# Patient Record
Sex: FEMALE | Race: WHITE | NOT HISPANIC OR LATINO | Employment: OTHER | URBAN - METROPOLITAN AREA
[De-identification: names, ages, dates, MRNs, and addresses within clinical notes are randomized per-mention and may not be internally consistent; named-entity substitution may affect disease eponyms.]

---

## 2019-06-17 ENCOUNTER — LAB REQUISITION (OUTPATIENT)
Dept: LAB | Facility: HOSPITAL | Age: 79
End: 2019-06-17

## 2019-06-17 DIAGNOSIS — R31.0 GROSS HEMATURIA: ICD-10-CM

## 2021-05-19 ENCOUNTER — TRANSCRIBE ORDERS (OUTPATIENT)
Dept: ADMINISTRATIVE | Facility: HOSPITAL | Age: 81
End: 2021-05-19

## 2021-05-19 DIAGNOSIS — R13.10 DYSPHAGIA, UNSPECIFIED TYPE: Primary | ICD-10-CM

## 2021-05-25 ENCOUNTER — APPOINTMENT (OUTPATIENT)
Dept: GENERAL RADIOLOGY | Facility: HOSPITAL | Age: 81
End: 2021-05-25

## 2021-05-30 ENCOUNTER — APPOINTMENT (OUTPATIENT)
Dept: PREADMISSION TESTING | Facility: HOSPITAL | Age: 81
End: 2021-05-30

## 2021-05-30 LAB — SARS-COV-2 RNA NOSE QL NAA+PROBE: NOT DETECTED

## 2021-05-30 PROCEDURE — U0005 INFEC AGEN DETEC AMPLI PROBE: HCPCS

## 2021-05-30 PROCEDURE — C9803 HOPD COVID-19 SPEC COLLECT: HCPCS

## 2021-05-30 PROCEDURE — U0004 COV-19 TEST NON-CDC HGH THRU: HCPCS

## 2021-06-02 ENCOUNTER — HOSPITAL ENCOUNTER (OUTPATIENT)
Dept: GENERAL RADIOLOGY | Facility: HOSPITAL | Age: 81
Discharge: HOME | End: 2021-06-02
Admitting: INTERNAL MEDICINE

## 2021-06-02 DIAGNOSIS — R13.10 DYSPHAGIA, UNSPECIFIED TYPE: ICD-10-CM

## 2021-06-02 PROCEDURE — 63710000001 BARIUM SULFATE 40 % PASTE: Performed by: INTERNAL MEDICINE

## 2021-06-02 PROCEDURE — 74230 X-RAY XM SWLNG FUNCJ C+: CPT

## 2021-06-02 PROCEDURE — A9270 NON-COVERED ITEM OR SERVICE: HCPCS | Performed by: INTERNAL MEDICINE

## 2021-06-02 PROCEDURE — 63710000001 BARIUM SULFATE 40 % RECONSTITUTED SUSPENSION: Performed by: INTERNAL MEDICINE

## 2021-06-02 PROCEDURE — 92611 MOTION FLUOROSCOPY/SWALLOW: CPT

## 2021-06-02 RX ADMIN — BARIUM SULFATE 100 ML: 0.81 POWDER, FOR SUSPENSION ORAL at 10:56

## 2021-06-02 RX ADMIN — BARIUM SULFATE 20 ML: 400 PASTE ORAL at 10:56

## 2021-06-02 NOTE — MBS/VFSS/FEES
"Outpatient Speech Language Pathology   Adult Swallow Initial Evaluation  [facility]   Modified Barium Swallow Study (MBS)     Patient Name: Naila Landin  : 1940  MRN: [id number]  Today's Date: 2021         Visit Date: 2021       Visit Dx:     ICD-10-CM ICD-9-CM   1. Dysphagia, unspecified type  R13.10 787.20         SLP Adult Swallow Evaluation     Row Name 21 1030       Rehab Evaluation    Document Type  evaluation  -AC    Subjective Information  no complaints  -AC    Patient Observations  alert;cooperative  -AC    Patient/Family/Caregiver Comments/Observations  No family present.  -AC    Patient Effort  good  -AC       General Information    Patient Profile Reviewed  yes  -AC    Pertinent History Of Current Problem  Patient was referred for modified barium swallow study due to intermittent \"choking\" with food/liquid and globus sensation upper right throat. Reported that she recently underwent EGD that showed evidence of \"reflux,\" but no other concerns. Patient denied history of neurological or respiratory diagnoses.  -AC    Current Method of Nutrition  regular textures;thin liquids  -AC    Precautions/Limitations, Vision  WFL;for purposes of eval  -AC    Precautions/Limitations, Hearing  WFL;for purposes of eval  -AC       Pain    Additional Documentation  Pain Scale: FACES Pre/Post-Treatment (Group)  -AC       Pain Scale: FACES Pre/Post-Treatment    Pain: FACES Scale, Pretreatment  0-->no hurt  -AC    Posttreatment Pain Rating  0-->no hurt  -AC       Oral Motor Structure and Function    Dentition Assessment  natural, present and adequate  -AC    Secretion Management  WNL/WFL  -AC    Mucosal Quality  moist, healthy  -AC       General Eating/Swallowing Observations    Respiratory Support Currently in Use  room air  -AC    Eating/Swallowing Skills  self-fed;appropriate self-feeding skills observed  -AC    Positioning During Eating  upright 90 degree;upright in chair  -AC       MBS/VFSS    " Utensils Used  spoon;cup;straw  -    Consistencies Trialed  thin liquids;pudding thick;regular textures  -       MBS/VFSS Interpretation    Oral Prep Phase  WFL  -AC    Oral Transit Phase  WFL  -AC    Oral Residue  WFL  -AC       Initiation of Pharyngeal Swallow    Initiation of Pharyngeal Swallow  bolus in valleculae  -    Pharyngeal Phase  functional pharyngeal phase of swallowing  -    Pharyngeal Residue  all consistencies tested;valleculae;other (see comments) minimal amount  -    Pharyngeal Phase, Comment  Grossly functional oropharyngeal swallow. Adequate timing of pharyngeal swallow initiation and closure at the entrance to the airway. No penetration/aspiration appreciated with any consistency trialed, even when patient pushed with consecutive straw drinks. Minimal vallecular residue noted across consistencies, but easily cleared with subsequent and spontaneous swallows. Of note, patient appeared to exhibit prominent cricopharygeous, which partially occluded bolus flow through PES, but did not appear to affect swallow safety or result in retrograde flow.     Suspect patient's complaints of globus may be related to recently diagnosed reflux or potentially hypersensitivity to minimal pharyngoesophageal residue. May also consider ENT referral to r/o anatomical/tissue abnormalities, per physician discretion.  -       Clinical Impression    SLP Swallowing Diagnosis  functional oral phase;functional pharyngeal phase  -    Swallow Criteria for Skilled Therapeutic Interventions Met  no problems identified which require skilled intervention  -       Recommendations    SLP Diet Recommendation  regular textures;thin liquids  -    Recommended Precautions and Strategies  upright posture during/after eating;general aspiration precautions;reflux precautions  -    Oral Care Recommendations  Oral Care BID/PRN  -AC    SLP Rec. for Method of Medication Administration  meds whole;with thin liquids;with  pudding or applesauce;as tolerated  -AC    Demonstrates Need for Referral to Another Service  otolaryngology (ENT);other (see comments) per physician discretion  -AC      User Key  (r) = Recorded By, (t) = Taken By, (c) = Cosigned By    Initials Name Provider Type    Mechelle Gaytan MS CCC-SLP Speech and Language Pathologist          OP SLP Education     Row Name 06/02/21 1347       Education    Barriers to Learning  No barriers identified  -AC    Education Provided  Described results of evaluation;Patient expressed understanding of evaluation  -AC    Assessed  Learning needs;Learning motivation;Learning preferences;Learning readiness  -AC    Learning Motivation  Strong  -AC    Learning Method  Explanation;Written materials  -AC    Teaching Response  Verbalized understanding  -AC    Education Comments  Provided written handouts re: reflux and managment.  -AC      User Key  (r) = Recorded By, (t) = Taken By, (c) = Cosigned By    Initials Name Effective Dates    AC Mechelle Shin MS CCC-SLP 07/27/17 -           OP SLP Assessment/Plan - 06/02/21 1346        SLP Assessment    Functional Problems  Swallowing   -AC    Clinical Impression: Swallowing  --    Grossly functional oropharyngeal swallow  -AC    Prognosis  Good (comment)   -AC    Patient would benefit from skilled therapy intervention  No   -AC      User Key  (r) = Recorded By, (t) = Taken By, (c) = Cosigned By    Initials Name Provider Type    Mechelle Gaytan MS CCC-SLP Speech and Language Pathologist        Time Calculation:   SLP Start Time: 1030  Untimed Charges  67590-SI Motion Fluoro Eval Swallow Minutes: 54  Total Minutes  Untimed Charges Total Minutes: 54   Total Minutes: 54    Therapy Charges for Today     Code Description Service Date Service Provider Modifiers Qty    [id number] HC ST MOTION FLUORO EVAL SWALLOW 4 6/2/2021 Mechelle Shin MS CCC-SLP GN 1        Patient was not wearing a face mask and did not exhibit coughing during this therapy  encounter.  Procedure performed was aerosolizing, involved close contact (within 6 feet for at least 15 minutes or longer), and did not involve contact with infectious secretions or specimens.  Therapist used appropriate personal protective equipment including gloves, standard procedure mask and eye protection.  Appropriate PPE was worn during the entire therapy session.  Hand hygiene was completed before and after therapy session.              Mechelle Shin MS CCC-SLP  6/2/2021

## 2022-04-28 ENCOUNTER — TRANSCRIBE ORDERS (OUTPATIENT)
Dept: CARDIOLOGY | Facility: HOSPITAL | Age: 82
End: 2022-04-28

## 2022-04-28 ENCOUNTER — HOSPITAL ENCOUNTER (OUTPATIENT)
Dept: CARDIOLOGY | Facility: HOSPITAL | Age: 82
Discharge: HOME | End: 2022-04-28
Admitting: OTOLARYNGOLOGY

## 2022-04-28 DIAGNOSIS — Z01.811 PRE-OP CHEST EXAM: ICD-10-CM

## 2022-04-28 DIAGNOSIS — Z01.811 PRE-OP CHEST EXAM: Primary | ICD-10-CM

## 2022-04-28 LAB
QT INTERVAL: 290 MS
QTC INTERVAL: 301 MS

## 2022-04-28 PROCEDURE — 93010 ELECTROCARDIOGRAM REPORT: CPT | Performed by: INTERNAL MEDICINE

## 2022-04-28 PROCEDURE — 93005 ELECTROCARDIOGRAM TRACING: CPT | Performed by: OTOLARYNGOLOGY

## 2023-03-01 ENCOUNTER — TRANSCRIBE ORDERS (OUTPATIENT)
Dept: ADMINISTRATIVE | Facility: HOSPITAL | Age: 83
End: 2023-03-01
Payer: MEDICARE

## 2023-03-01 DIAGNOSIS — Z12.31 VISIT FOR SCREENING MAMMOGRAM: Primary | ICD-10-CM

## 2024-07-18 ENCOUNTER — PRE-ADMISSION TESTING (OUTPATIENT)
Dept: PREADMISSION TESTING | Facility: HOSPITAL | Age: 84
End: 2024-07-18
Payer: MEDICARE

## 2024-07-18 VITALS — HEIGHT: 66 IN | BODY MASS INDEX: 32.17 KG/M2 | WEIGHT: 200.18 LBS

## 2024-07-18 DIAGNOSIS — Z01.89 LABORATORY TEST: Primary | ICD-10-CM

## 2024-07-18 LAB
ABO GROUP BLD: NORMAL
ALBUMIN SERPL-MCNC: 3.9 G/DL (ref 3.5–5.2)
ALBUMIN/GLOB SERPL: 1.3 G/DL
ALP SERPL-CCNC: 106 U/L (ref 39–117)
ALT SERPL W P-5'-P-CCNC: 19 U/L (ref 1–33)
ANION GAP SERPL CALCULATED.3IONS-SCNC: 10 MMOL/L (ref 5–15)
AST SERPL-CCNC: 22 U/L (ref 1–32)
BILIRUB SERPL-MCNC: 0.3 MG/DL (ref 0–1.2)
BUN SERPL-MCNC: 17 MG/DL (ref 8–23)
BUN/CREAT SERPL: 22.1 (ref 7–25)
CALCIUM SPEC-SCNC: 9.1 MG/DL (ref 8.6–10.5)
CEA SERPL-MCNC: 1.42 NG/ML
CHLORIDE SERPL-SCNC: 105 MMOL/L (ref 98–107)
CO2 SERPL-SCNC: 24 MMOL/L (ref 22–29)
CREAT SERPL-MCNC: 0.77 MG/DL (ref 0.57–1)
DEPRECATED RDW RBC AUTO: 44.3 FL (ref 37–54)
EGFRCR SERPLBLD CKD-EPI 2021: 77.1 ML/MIN/1.73
ERYTHROCYTE [DISTWIDTH] IN BLOOD BY AUTOMATED COUNT: 12.4 % (ref 12.3–15.4)
GLOBULIN UR ELPH-MCNC: 3.1 GM/DL
GLUCOSE SERPL-MCNC: 111 MG/DL (ref 65–99)
HBA1C MFR BLD: 6.2 % (ref 4.8–5.6)
HCT VFR BLD AUTO: 44.8 % (ref 34–46.6)
HGB BLD-MCNC: 14.4 G/DL (ref 12–15.9)
MCH RBC QN AUTO: 31 PG (ref 26.6–33)
MCHC RBC AUTO-ENTMCNC: 32.1 G/DL (ref 31.5–35.7)
MCV RBC AUTO: 96.3 FL (ref 79–97)
PLATELET # BLD AUTO: 246 10*3/MM3 (ref 140–450)
PMV BLD AUTO: 10.6 FL (ref 6–12)
POTASSIUM SERPL-SCNC: 4.4 MMOL/L (ref 3.5–5.2)
PROT SERPL-MCNC: 7 G/DL (ref 6–8.5)
RBC # BLD AUTO: 4.65 10*6/MM3 (ref 3.77–5.28)
RH BLD: POSITIVE
SODIUM SERPL-SCNC: 139 MMOL/L (ref 136–145)
WBC NRBC COR # BLD AUTO: 8.95 10*3/MM3 (ref 3.4–10.8)

## 2024-07-18 PROCEDURE — 86900 BLOOD TYPING SEROLOGIC ABO: CPT

## 2024-07-18 PROCEDURE — 93005 ELECTROCARDIOGRAM TRACING: CPT

## 2024-07-18 PROCEDURE — 36415 COLL VENOUS BLD VENIPUNCTURE: CPT

## 2024-07-18 PROCEDURE — 86901 BLOOD TYPING SEROLOGIC RH(D): CPT

## 2024-07-18 PROCEDURE — 83036 HEMOGLOBIN GLYCOSYLATED A1C: CPT

## 2024-07-18 PROCEDURE — 85027 COMPLETE CBC AUTOMATED: CPT

## 2024-07-18 PROCEDURE — 80053 COMPREHEN METABOLIC PANEL: CPT

## 2024-07-18 PROCEDURE — 82378 CARCINOEMBRYONIC ANTIGEN: CPT

## 2024-07-18 RX ORDER — LEVOTHYROXINE SODIUM 0.03 MG/1
25 TABLET ORAL
COMMUNITY

## 2024-07-18 RX ORDER — MULTIPLE VITAMINS W/ MINERALS TAB 9MG-400MCG
1 TAB ORAL DAILY
COMMUNITY

## 2024-07-18 RX ORDER — METOPROLOL SUCCINATE 100 MG/1
100 TABLET, EXTENDED RELEASE ORAL NIGHTLY
COMMUNITY

## 2024-07-18 NOTE — PAT
"Patient to apply Chlorhexadine wipes  to surgical area (as instructed) the night before procedure and the AM of procedure. Wipes provided.    Patient instructed to drink 20 ounces of Gatorade or Gatorlyte (if diabetic) and it needs to be completed 1 hour (for Main OR patients) or 2 hours (scheduled  section & [facility] patients) before given arrival time for procedure (NO RED Gatorade and NO Gatorade Zero).  Patient verbalized understanding.    Ten (8 ounce) Impact Advanced Recovery Nutritional Drinks distributed to patient from Pre Admission Testing Department.  Verbal and written instructions given that patient must consume two (8 ounce) Impact drinks a day for five days before surgery.  Flavoring tip sheet provided as well the brochure \"Go in Stronger. Get Home Sooner\" that explains benefits of nutritional drinks to enhance recovery. Patient/family verbalized understanding.     Patient viewed general PAT education video as instructed in their preoperative information received from their surgeon.  Patient stated the general PAT education video was viewed in its entirety and survey completed.  Copies of [facility] general education handouts (Incentive Spirometry, Meds to Beds Program, Patient Belongings, Pre-op skin preparation instructions, Blood Glucose testing, Visitor policy, Surgery FAQ, Code H) distributed to patient if not printed. Education related to the PAT pass and skin preparation for surgery (if applicable) completed in PAT as a reinforcement to PAT education video. Patient instructed to return PAT pass provided today as well as completed skin preparation sheet (if applicable) on the day of procedure.     Additionally if patient had not viewed video yet but intended to view it at home or in our waiting area, then referred them to the handout with QR code/link provided during PAT visit.  Instructed patient to complete survey after viewing the video in its entirety.  Encouraged patient/family to read PAT " general education handouts thoroughly and notify PAT staff with any questions or concerns. Patient verbalized understanding of all information and priority content.    Lizbeth Landin gi navigator notified of surgery    Azeem with wound ostomy came to talk with patient

## 2024-07-21 LAB
QT INTERVAL: 406 MS
QTC INTERVAL: 462 MS

## 2024-07-23 ENCOUNTER — TRANSCRIBE ORDERS (OUTPATIENT)
Dept: ADMINISTRATIVE | Facility: HOSPITAL | Age: 84
End: 2024-07-23
Payer: MEDICARE

## 2024-07-23 DIAGNOSIS — D49.0 CECAL NEOPLASM: Primary | ICD-10-CM

## 2024-07-26 ENCOUNTER — HOSPITAL ENCOUNTER (OUTPATIENT)
Facility: HOSPITAL | Age: 84
Discharge: HOME | End: 2024-07-26
Payer: MEDICARE

## 2024-07-26 DIAGNOSIS — D49.0 CECAL NEOPLASM: ICD-10-CM

## 2024-07-26 PROCEDURE — 74177 CT ABD & PELVIS W/CONTRAST: CPT

## 2024-07-26 PROCEDURE — 25510000001 IOPAMIDOL 61 % SOLUTION: Performed by: COLON & RECTAL SURGERY

## 2024-07-26 RX ADMIN — IOPAMIDOL 100 ML: 612 INJECTION, SOLUTION INTRAVENOUS at 14:17

## 2024-08-05 ENCOUNTER — ANESTHESIA EVENT (OUTPATIENT)
Dept: PERIOP | Facility: HOSPITAL | Age: 84
End: 2024-08-05
Payer: MEDICARE

## 2024-08-05 RX ORDER — SODIUM CHLORIDE 0.9 % (FLUSH) 0.9 %
10 SYRINGE (ML) INJECTION EVERY 12 HOURS SCHEDULED
Status: CANCELLED | OUTPATIENT
Start: 2024-08-05

## 2024-08-05 RX ORDER — SODIUM CHLORIDE 9 MG/ML
40 INJECTION, SOLUTION INTRAVENOUS AS NEEDED
Status: CANCELLED | OUTPATIENT
Start: 2024-08-05

## 2024-08-05 RX ORDER — SODIUM CHLORIDE 0.9 % (FLUSH) 0.9 %
10 SYRINGE (ML) INJECTION AS NEEDED
Status: CANCELLED | OUTPATIENT
Start: 2024-08-05

## 2024-08-06 ENCOUNTER — ANESTHESIA (OUTPATIENT)
Dept: PERIOP | Facility: HOSPITAL | Age: 84
End: 2024-08-06
Payer: MEDICARE

## 2024-08-06 ENCOUNTER — APPOINTMENT (OUTPATIENT)
Dept: GENERAL RADIOLOGY | Facility: HOSPITAL | Age: 84
DRG: 331 | End: 2024-08-06
Payer: MEDICARE

## 2024-08-06 ENCOUNTER — ANESTHESIA EVENT CONVERTED (OUTPATIENT)
Dept: ANESTHESIOLOGY | Facility: HOSPITAL | Age: 84
DRG: 331 | End: 2024-08-06
Payer: MEDICARE

## 2024-08-06 ENCOUNTER — HOSPITAL ENCOUNTER (INPATIENT)
Facility: HOSPITAL | Age: 84
LOS: 6 days | Discharge: HOME HEALTH | DRG: 331 | End: 2024-08-12
Attending: COLON & RECTAL SURGERY | Admitting: COLON & RECTAL SURGERY
Payer: MEDICARE

## 2024-08-06 DIAGNOSIS — E03.9 HYPOTHYROIDISM (ACQUIRED): ICD-10-CM

## 2024-08-06 DIAGNOSIS — R73.09 ELEVATED HEMOGLOBIN A1C: ICD-10-CM

## 2024-08-06 DIAGNOSIS — Z90.49 S/P RIGHT COLECTOMY: Primary | ICD-10-CM

## 2024-08-06 DIAGNOSIS — I10 HYPERTENSION, UNSPECIFIED TYPE: ICD-10-CM

## 2024-08-06 DIAGNOSIS — D49.0 CECAL NEOPLASM: ICD-10-CM

## 2024-08-06 LAB
ABO GROUP BLD: NORMAL
BLD GP AB SCN SERPL QL: NEGATIVE
RH BLD: POSITIVE
T&S EXPIRATION DATE: NORMAL

## 2024-08-06 PROCEDURE — 88305 TISSUE EXAM BY PATHOLOGIST: CPT | Performed by: COLON & RECTAL SURGERY

## 2024-08-06 PROCEDURE — 74018 RADEX ABDOMEN 1 VIEW: CPT

## 2024-08-06 PROCEDURE — 25010000002 PROPOFOL 10 MG/ML EMULSION: Performed by: ANESTHESIOLOGY

## 2024-08-06 PROCEDURE — 25010000002 ERTAPENEM PER 500 MG: Performed by: COLON & RECTAL SURGERY

## 2024-08-06 PROCEDURE — 88309 TISSUE EXAM BY PATHOLOGIST: CPT | Performed by: COLON & RECTAL SURGERY

## 2024-08-06 PROCEDURE — 86850 RBC ANTIBODY SCREEN: CPT | Performed by: COLON & RECTAL SURGERY

## 2024-08-06 PROCEDURE — 0DTF0ZZ RESECTION OF RIGHT LARGE INTESTINE, OPEN APPROACH: ICD-10-PCS | Performed by: COLON & RECTAL SURGERY

## 2024-08-06 PROCEDURE — 25810000003 LACTATED RINGERS PER 1000 ML: Performed by: ANESTHESIOLOGY

## 2024-08-06 PROCEDURE — 86901 BLOOD TYPING SEROLOGIC RH(D): CPT | Performed by: COLON & RECTAL SURGERY

## 2024-08-06 PROCEDURE — 25010000002 FENTANYL CITRATE (PF) 50 MCG/ML SOLUTION

## 2024-08-06 PROCEDURE — 86900 BLOOD TYPING SEROLOGIC ABO: CPT | Performed by: COLON & RECTAL SURGERY

## 2024-08-06 PROCEDURE — 25010000002 ONDANSETRON PER 1 MG: Performed by: ANESTHESIOLOGY

## 2024-08-06 PROCEDURE — 25010000002 DEXAMETHASONE SODIUM PHOSPHATE 10 MG/ML SOLUTION: Performed by: ANESTHESIOLOGY

## 2024-08-06 PROCEDURE — 25010000002 HYDROMORPHONE 1 MG/ML SOLUTION

## 2024-08-06 PROCEDURE — 25010000002 HEPARIN (PORCINE) PER 1000 UNITS: Performed by: COLON & RECTAL SURGERY

## 2024-08-06 PROCEDURE — 25010000002 BUPIVACAINE (PF) 0.25 % SOLUTION: Performed by: ANESTHESIOLOGY

## 2024-08-06 PROCEDURE — 0DNW4ZZ RELEASE PERITONEUM, PERCUTANEOUS ENDOSCOPIC APPROACH: ICD-10-PCS | Performed by: COLON & RECTAL SURGERY

## 2024-08-06 PROCEDURE — 25010000002 SODIUM CHLORIDE 0.9 % WITH KCL 20 MEQ 20-0.9 MEQ/L-% SOLUTION: Performed by: COLON & RECTAL SURGERY

## 2024-08-06 PROCEDURE — 25010000002 SUGAMMADEX 200 MG/2ML SOLUTION: Performed by: ANESTHESIOLOGY

## 2024-08-06 DEVICE — PROXIMATE RELOADABLE LINEAR CUTTER WITH SAFETY LOCK-OUT, 75MM
Type: IMPLANTABLE DEVICE | Site: ABDOMEN | Status: FUNCTIONAL
Brand: PROXIMATE

## 2024-08-06 DEVICE — PROXIMATE LINEAR CUTTER RELOAD, BLUE, 75MM
Type: IMPLANTABLE DEVICE | Site: ABDOMEN | Status: FUNCTIONAL
Brand: PROXIMATE

## 2024-08-06 DEVICE — PROXIMATE RELOADABLE LINEAR STAPLER
Type: IMPLANTABLE DEVICE | Site: ABDOMEN | Status: FUNCTIONAL
Brand: PROXIMATE

## 2024-08-06 RX ORDER — SODIUM CHLORIDE, SODIUM LACTATE, POTASSIUM CHLORIDE, CALCIUM CHLORIDE 600; 310; 30; 20 MG/100ML; MG/100ML; MG/100ML; MG/100ML
9 INJECTION, SOLUTION INTRAVENOUS CONTINUOUS
Status: DISCONTINUED | OUTPATIENT
Start: 2024-08-06 | End: 2024-08-10

## 2024-08-06 RX ORDER — PREGABALIN 75 MG/1
75 CAPSULE ORAL ONCE
Status: COMPLETED | OUTPATIENT
Start: 2024-08-06 | End: 2024-08-06

## 2024-08-06 RX ORDER — BUPIVACAINE HYDROCHLORIDE 2.5 MG/ML
INJECTION, SOLUTION EPIDURAL; INFILTRATION; INTRACAUDAL
Status: COMPLETED | OUTPATIENT
Start: 2024-08-06 | End: 2024-08-06

## 2024-08-06 RX ORDER — ALVIMOPAN 12 MG/1
12 CAPSULE ORAL ONCE
Status: COMPLETED | OUTPATIENT
Start: 2024-08-06 | End: 2024-08-06

## 2024-08-06 RX ORDER — HYDROMORPHONE HYDROCHLORIDE 1 MG/ML
0.5 INJECTION, SOLUTION INTRAMUSCULAR; INTRAVENOUS; SUBCUTANEOUS
Status: DISCONTINUED | OUTPATIENT
Start: 2024-08-06 | End: 2024-08-06 | Stop reason: HOSPADM

## 2024-08-06 RX ORDER — GABAPENTIN 100 MG/1
100 CAPSULE ORAL 2 TIMES DAILY
Status: DISPENSED | OUTPATIENT
Start: 2024-08-06 | End: 2024-08-09

## 2024-08-06 RX ORDER — DIAZEPAM 5 MG/1
5 TABLET ORAL EVERY 6 HOURS PRN
Status: DISCONTINUED | OUTPATIENT
Start: 2024-08-06 | End: 2024-08-12 | Stop reason: HOSPADM

## 2024-08-06 RX ORDER — FAMOTIDINE 10 MG/ML
20 INJECTION, SOLUTION INTRAVENOUS ONCE
Status: DISCONTINUED | OUTPATIENT
Start: 2024-08-06 | End: 2024-08-06 | Stop reason: HOSPADM

## 2024-08-06 RX ORDER — IBUPROFEN 600 MG/1
1 TABLET ORAL
Status: DISCONTINUED | OUTPATIENT
Start: 2024-08-06 | End: 2024-08-12

## 2024-08-06 RX ORDER — DEXAMETHASONE SODIUM PHOSPHATE 10 MG/ML
INJECTION, SOLUTION INTRAMUSCULAR; INTRAVENOUS
Status: COMPLETED | OUTPATIENT
Start: 2024-08-06 | End: 2024-08-06

## 2024-08-06 RX ORDER — LATANOPROST 50 UG/ML
1 SOLUTION/ DROPS OPHTHALMIC NIGHTLY
COMMUNITY

## 2024-08-06 RX ORDER — DEXTROSE MONOHYDRATE 25 G/50ML
25 INJECTION, SOLUTION INTRAVENOUS
Status: DISCONTINUED | OUTPATIENT
Start: 2024-08-06 | End: 2024-08-12

## 2024-08-06 RX ORDER — METOPROLOL SUCCINATE 100 MG/1
100 TABLET, EXTENDED RELEASE ORAL NIGHTLY
Status: DISCONTINUED | OUTPATIENT
Start: 2024-08-07 | End: 2024-08-12 | Stop reason: HOSPADM

## 2024-08-06 RX ORDER — LIDOCAINE HYDROCHLORIDE 10 MG/ML
0.5 INJECTION, SOLUTION EPIDURAL; INFILTRATION; INTRACAUDAL; PERINEURAL ONCE AS NEEDED
Status: COMPLETED | OUTPATIENT
Start: 2024-08-06 | End: 2024-08-06

## 2024-08-06 RX ORDER — NITROGLYCERIN 0.4 MG/1
0.4 TABLET SUBLINGUAL
Status: DISCONTINUED | OUTPATIENT
Start: 2024-08-06 | End: 2024-08-12 | Stop reason: HOSPADM

## 2024-08-06 RX ORDER — ACETAMINOPHEN 500 MG
1000 TABLET ORAL ONCE
Status: COMPLETED | OUTPATIENT
Start: 2024-08-06 | End: 2024-08-06

## 2024-08-06 RX ORDER — FAMOTIDINE 20 MG/1
20 TABLET, FILM COATED ORAL ONCE
Status: COMPLETED | OUTPATIENT
Start: 2024-08-06 | End: 2024-08-06

## 2024-08-06 RX ORDER — ALVIMOPAN 12 MG/1
12 CAPSULE ORAL 2 TIMES DAILY
Status: DISCONTINUED | OUTPATIENT
Start: 2024-08-07 | End: 2024-08-11 | Stop reason: ALTCHOICE

## 2024-08-06 RX ORDER — ONDANSETRON 2 MG/ML
INJECTION INTRAMUSCULAR; INTRAVENOUS AS NEEDED
Status: DISCONTINUED | OUTPATIENT
Start: 2024-08-06 | End: 2024-08-06 | Stop reason: SURG

## 2024-08-06 RX ORDER — INSULIN LISPRO 100 [IU]/ML
2-7 INJECTION, SOLUTION INTRAVENOUS; SUBCUTANEOUS
Status: DISCONTINUED | OUTPATIENT
Start: 2024-08-07 | End: 2024-08-08 | Stop reason: ALTCHOICE

## 2024-08-06 RX ORDER — DROPERIDOL 2.5 MG/ML
0.62 INJECTION, SOLUTION INTRAMUSCULAR; INTRAVENOUS ONCE AS NEEDED
Status: DISCONTINUED | OUTPATIENT
Start: 2024-08-06 | End: 2024-08-06 | Stop reason: HOSPADM

## 2024-08-06 RX ORDER — BUPIVACAINE HCL/0.9 % NACL/PF 0.125 %
PLASTIC BAG, INJECTION (ML) EPIDURAL AS NEEDED
Status: DISCONTINUED | OUTPATIENT
Start: 2024-08-06 | End: 2024-08-06 | Stop reason: SURG

## 2024-08-06 RX ORDER — FENTANYL CITRATE 50 UG/ML
INJECTION, SOLUTION INTRAMUSCULAR; INTRAVENOUS
Status: COMPLETED
Start: 2024-08-06 | End: 2024-08-06

## 2024-08-06 RX ORDER — SODIUM CHLORIDE AND POTASSIUM CHLORIDE 150; 900 MG/100ML; MG/100ML
100 INJECTION, SOLUTION INTRAVENOUS CONTINUOUS
Status: DISCONTINUED | OUTPATIENT
Start: 2024-08-06 | End: 2024-08-07 | Stop reason: ALTCHOICE

## 2024-08-06 RX ORDER — NICOTINE POLACRILEX 4 MG
15 LOZENGE BUCCAL
Status: DISCONTINUED | OUTPATIENT
Start: 2024-08-06 | End: 2024-08-12

## 2024-08-06 RX ORDER — HYDROMORPHONE HYDROCHLORIDE 1 MG/ML
0.5 INJECTION, SOLUTION INTRAMUSCULAR; INTRAVENOUS; SUBCUTANEOUS
Status: DISCONTINUED | OUTPATIENT
Start: 2024-08-06 | End: 2024-08-06 | Stop reason: SDUPTHER

## 2024-08-06 RX ORDER — IBUPROFEN 400 MG/1
400 TABLET ORAL EVERY 6 HOURS PRN
Status: DISCONTINUED | OUTPATIENT
Start: 2024-08-06 | End: 2024-08-12 | Stop reason: HOSPADM

## 2024-08-06 RX ORDER — FENTANYL CITRATE 50 UG/ML
50 INJECTION, SOLUTION INTRAMUSCULAR; INTRAVENOUS
Status: DISCONTINUED | OUTPATIENT
Start: 2024-08-06 | End: 2024-08-06 | Stop reason: HOSPADM

## 2024-08-06 RX ORDER — ROCURONIUM BROMIDE 10 MG/ML
INJECTION, SOLUTION INTRAVENOUS AS NEEDED
Status: DISCONTINUED | OUTPATIENT
Start: 2024-08-06 | End: 2024-08-06 | Stop reason: SURG

## 2024-08-06 RX ORDER — NALOXONE HCL 0.4 MG/ML
0.4 VIAL (ML) INJECTION
Status: DISCONTINUED | OUTPATIENT
Start: 2024-08-06 | End: 2024-08-12 | Stop reason: HOSPADM

## 2024-08-06 RX ORDER — LIDOCAINE HYDROCHLORIDE 10 MG/ML
INJECTION, SOLUTION EPIDURAL; INFILTRATION; INTRACAUDAL; PERINEURAL AS NEEDED
Status: DISCONTINUED | OUTPATIENT
Start: 2024-08-06 | End: 2024-08-06 | Stop reason: SURG

## 2024-08-06 RX ORDER — ACETAMINOPHEN 325 MG/1
650 TABLET ORAL EVERY 8 HOURS
Status: DISCONTINUED | OUTPATIENT
Start: 2024-08-06 | End: 2024-08-12 | Stop reason: HOSPADM

## 2024-08-06 RX ORDER — MIDAZOLAM HYDROCHLORIDE 1 MG/ML
0.5 INJECTION INTRAMUSCULAR; INTRAVENOUS
Status: DISCONTINUED | OUTPATIENT
Start: 2024-08-06 | End: 2024-08-06 | Stop reason: HOSPADM

## 2024-08-06 RX ORDER — DEXAMETHASONE SODIUM PHOSPHATE 10 MG/ML
INJECTION, SOLUTION INTRAMUSCULAR; INTRAVENOUS AS NEEDED
Status: DISCONTINUED | OUTPATIENT
Start: 2024-08-06 | End: 2024-08-06 | Stop reason: SURG

## 2024-08-06 RX ORDER — PROPOFOL 10 MG/ML
VIAL (ML) INTRAVENOUS AS NEEDED
Status: DISCONTINUED | OUTPATIENT
Start: 2024-08-06 | End: 2024-08-06 | Stop reason: SURG

## 2024-08-06 RX ORDER — LABETALOL HYDROCHLORIDE 5 MG/ML
10 INJECTION, SOLUTION INTRAVENOUS EVERY 4 HOURS PRN
Status: COMPLETED | OUTPATIENT
Start: 2024-08-06 | End: 2024-08-08

## 2024-08-06 RX ORDER — HYDROCODONE BITARTRATE AND ACETAMINOPHEN 7.5; 325 MG/1; MG/1
1 TABLET ORAL EVERY 4 HOURS PRN
Status: DISCONTINUED | OUTPATIENT
Start: 2024-08-06 | End: 2024-08-12 | Stop reason: HOSPADM

## 2024-08-06 RX ORDER — HEPARIN SODIUM 5000 [USP'U]/ML
5000 INJECTION, SOLUTION INTRAVENOUS; SUBCUTANEOUS ONCE
Status: COMPLETED | OUTPATIENT
Start: 2024-08-06 | End: 2024-08-06

## 2024-08-06 RX ORDER — FENTANYL CITRATE 50 UG/ML
50 INJECTION, SOLUTION INTRAMUSCULAR; INTRAVENOUS
Status: DISCONTINUED | OUTPATIENT
Start: 2024-08-06 | End: 2024-08-06 | Stop reason: SDUPTHER

## 2024-08-06 RX ORDER — HEPARIN SODIUM 5000 [USP'U]/ML
5000 INJECTION, SOLUTION INTRAVENOUS; SUBCUTANEOUS EVERY 8 HOURS SCHEDULED
Status: DISCONTINUED | OUTPATIENT
Start: 2024-08-07 | End: 2024-08-12 | Stop reason: HOSPADM

## 2024-08-06 RX ORDER — EPHEDRINE SULFATE 50 MG/ML
INJECTION INTRAVENOUS AS NEEDED
Status: DISCONTINUED | OUTPATIENT
Start: 2024-08-06 | End: 2024-08-06 | Stop reason: SURG

## 2024-08-06 RX ORDER — LEVOTHYROXINE SODIUM 0.03 MG/1
25 TABLET ORAL
Status: DISCONTINUED | OUTPATIENT
Start: 2024-08-07 | End: 2024-08-12 | Stop reason: HOSPADM

## 2024-08-06 RX ORDER — LATANOPROST 50 UG/ML
1 SOLUTION/ DROPS OPHTHALMIC NIGHTLY
Status: DISCONTINUED | OUTPATIENT
Start: 2024-08-07 | End: 2024-08-12 | Stop reason: HOSPADM

## 2024-08-06 RX ADMIN — PROPOFOL 150 MG: 10 INJECTION, EMULSION INTRAVENOUS at 15:52

## 2024-08-06 RX ADMIN — LIDOCAINE HYDROCHLORIDE 50 MG: 10 INJECTION, SOLUTION EPIDURAL; INFILTRATION; INTRACAUDAL; PERINEURAL at 15:52

## 2024-08-06 RX ADMIN — ERTAPENEM SODIUM 1000 MG: 1 INJECTION, POWDER, LYOPHILIZED, FOR SOLUTION INTRAMUSCULAR; INTRAVENOUS at 16:09

## 2024-08-06 RX ADMIN — SUGAMMADEX 200 MG: 100 INJECTION, SOLUTION INTRAVENOUS at 17:32

## 2024-08-06 RX ADMIN — Medication 100 MCG: at 16:55

## 2024-08-06 RX ADMIN — BUPIVACAINE HYDROCHLORIDE 60 ML: 2.5 INJECTION, SOLUTION EPIDURAL; INFILTRATION; INTRACAUDAL; PERINEURAL at 16:05

## 2024-08-06 RX ADMIN — POTASSIUM CHLORIDE AND SODIUM CHLORIDE 100 ML/HR: 900; 150 INJECTION, SOLUTION INTRAVENOUS at 21:36

## 2024-08-06 RX ADMIN — ROCURONIUM BROMIDE 50 MG: 10 INJECTION INTRAVENOUS at 15:52

## 2024-08-06 RX ADMIN — HEPARIN SODIUM 5000 UNITS: 5000 INJECTION INTRAVENOUS; SUBCUTANEOUS at 12:35

## 2024-08-06 RX ADMIN — DEXAMETHASONE SODIUM PHOSPHATE 4 MG: 10 INJECTION INTRAMUSCULAR; INTRAVENOUS at 15:52

## 2024-08-06 RX ADMIN — ROCURONIUM BROMIDE 25 MG: 10 INJECTION INTRAVENOUS at 16:52

## 2024-08-06 RX ADMIN — FAMOTIDINE 20 MG: 20 TABLET, FILM COATED ORAL at 12:35

## 2024-08-06 RX ADMIN — SODIUM CHLORIDE, POTASSIUM CHLORIDE, SODIUM LACTATE AND CALCIUM CHLORIDE: 600; 310; 30; 20 INJECTION, SOLUTION INTRAVENOUS at 15:52

## 2024-08-06 RX ADMIN — HYDROMORPHONE HYDROCHLORIDE 0.5 MG: 1 INJECTION, SOLUTION INTRAMUSCULAR; INTRAVENOUS; SUBCUTANEOUS at 18:35

## 2024-08-06 RX ADMIN — FENTANYL CITRATE 50 MCG: 50 INJECTION, SOLUTION INTRAMUSCULAR; INTRAVENOUS at 18:46

## 2024-08-06 RX ADMIN — PREGABALIN 75 MG: 75 CAPSULE ORAL at 12:35

## 2024-08-06 RX ADMIN — HYDROMORPHONE HYDROCHLORIDE 0.5 MG: 1 INJECTION, SOLUTION INTRAMUSCULAR; INTRAVENOUS; SUBCUTANEOUS at 18:25

## 2024-08-06 RX ADMIN — SODIUM CHLORIDE, POTASSIUM CHLORIDE, SODIUM LACTATE AND CALCIUM CHLORIDE 9 ML/HR: 600; 310; 30; 20 INJECTION, SOLUTION INTRAVENOUS at 12:25

## 2024-08-06 RX ADMIN — FENTANYL CITRATE 50 MCG: 50 INJECTION, SOLUTION INTRAMUSCULAR; INTRAVENOUS at 18:15

## 2024-08-06 RX ADMIN — ACETAMINOPHEN 1000 MG: 500 TABLET ORAL at 12:35

## 2024-08-06 RX ADMIN — ONDANSETRON 4 MG: 2 INJECTION INTRAMUSCULAR; INTRAVENOUS at 17:32

## 2024-08-06 RX ADMIN — ALVIMOPAN 12 MG: 12 CAPSULE ORAL at 12:35

## 2024-08-06 RX ADMIN — LIDOCAINE HYDROCHLORIDE 0.5 ML: 10 INJECTION, SOLUTION EPIDURAL; INFILTRATION; INTRACAUDAL; PERINEURAL at 12:25

## 2024-08-06 RX ADMIN — EPHEDRINE SULFATE 10 MG: 50 INJECTION INTRAVENOUS at 16:55

## 2024-08-06 RX ADMIN — DEXAMETHASONE SODIUM PHOSPHATE 4 MG: 10 INJECTION, SOLUTION INTRAMUSCULAR; INTRAVENOUS at 16:05

## 2024-08-06 NOTE — NURSING NOTE
"Patient seen in pre-op for stoma marking    Patient placed in sitting and lying position with abdomen exposed.  Stoma marked at the RUQ and RLQ.     Folds, creases/scars, ribs & underwear line avoided. Rectus muscle identified.    Patient able to visualize stoma marking(s) and point to \"X\" with their finger.    Azeem Gonzalez RN, BSN, CCRN, CWOCN  Wound, Ostomy and Continence (WOC) Department  [facility]    "

## 2024-08-06 NOTE — ANESTHESIA PROCEDURE NOTES
"Peripheral Block      Patient reassessed immediately prior to procedure    Patient location during procedure: OR  Reason for block: at surgeon's request and post-op pain management  Preanesthetic Checklist  Completed: patient identified, IV checked, site marked, risks and benefits discussed, surgical consent, monitors and equipment checked, pre-op evaluation and timeout performed  Prep:  Pt Position: supine  Sterile barriers:cap, gloves, mask and washed/disinfected hands  Prep: ChloraPrep  Patient monitoring: blood pressure monitoring, continuous pulse oximetry and EKG  Procedure    Sedation: yes  Performed under: general  Guidance:ultrasound guided  Images:still images obtained, printed/placed on chart    Laterality:Bilateral  Block Type:TAP    Needle Type:short-bevel and echogenic  Needle Gauge:20 G  Resistance on Injection: none    Medications Used: dexamethasone sodium phosphate injection - Injection   4 mg - 8/6/2024 4:05:00 PM  bupivacaine PF (MARCAINE) 0.25 % injection - Injection   60 mL - 8/6/2024 4:05:00 PM      Medications  Comment:Block Injection:  LA dose divided between Right and Left block        Post Assessment  Injection Assessment: negative aspiration for heme, incremental injection and no paresthesia on injection  Patient Tolerance:comfortable throughout block  Complications:no  Additional Notes    Subcostal TAPs    A high-frequency linear transducer, with sterile cover, was placed sub-xiphoid to identify [place], right and left Rectus Abdominus Muscles (GIORGIO). The transducer was moved either right or left subcostally to identify the GIORGIO and the Transverse Abdominus Muscle (VEGA). The insertion site was prepped in sterile fashion and then localized with 2-5 ml of 1% Lidocaine. Using ultrasound-guidance, a 20-gauge B-Montoya 4\" Ultraplex 360 non-stimulating echogenic needle was advanced in plane, from medial to lateral, until the tip of the needle was in the fascial plane between the GIORGIO and VEGA. " "1-3ml of preservative free normal saline was used to hydro-dissect the fascial planes. After the fascial plane was verified, the local anesthetic (LA) was injected. The procedure was repeated on the opposite side for bilateral coverage. Aspiration every 5 ml to prevent intravascular injection. Injection was completed with negative aspiration of blood and negative intravascular injection. Injection pressures were normal with minimal resistance. The subcostal approach to the TAP nerve block ideally anesthetizes the intercostal nerves T6-T9.     Mid-Axillary/Lateral TAPs    A high-frequency linear transducer, with sterile cover, was placed in the midaxillary line between the ASIS and costal margin. The External Oblique Muscle (EOM), Internal Oblique Muscle (IOM), Transverse Abdominus Muscle (VEGA), and Peritoneum were identified. The insertion site was prepped in sterile fashion and then localized with 2-5 ml of 1% Lidocaine. Using ultrasound-guidance, a 20-gauge B-Montoya 4\" Ultraplex 360 non-stimulating echogenic needle was advanced in plane, from medial to lateral, until the tip of the needle was in the fascial plane between the IOM and VEGA. 1-3ml of preservative free normal saline was used to hydro-dissect the fascial planes. After the fascial plane was verified, the local anesthetic (LA) was injected. The procedure was repeated on the opposite side for bilateral coverage. Aspiration every 5 ml to prevent intravascular injection. Injection was completed with negative aspiration of blood and negative intravascular injection. Injection pressures were normal with minimal resistance. Midaxillary TAPs should reach intercostal nerves T10- T11 and the subcostal nerve T12.    Performed by: Dionicio Franklin MD            "

## 2024-08-06 NOTE — ANESTHESIA PROCEDURE NOTES
Airway  Urgency: elective    Airway not difficult    General Information and Staff    Patient location during procedure: OR    Indications and Patient Condition  Indications for airway management: airway protection    Preoxygenated: yes  MILS not maintained throughout  Mask difficulty assessment: 1 - vent by mask    Final Airway Details  Final airway type: endotracheal airway      Successful airway: ETT  Cuffed: yes   Successful intubation technique: direct laryngoscopy and video laryngoscopy  Facilitating devices/methods: intubating stylet  Endotracheal tube insertion site: oral  Blade: Stout  Blade size: 3  ETT size (mm): 7.0  Cormack-Lehane Classification: grade III - view of epiglottis only  Placement verified by: chest auscultation and capnometry   Measured from: lips  ETT/EBT  to lips (cm): 20  Number of attempts at approach: 1  Assessment: lips, teeth, and gum same as pre-op and atraumatic intubation    Additional Comments  Difficult DL, therefore VL.  Negative epigastric sounds, Breath sound equal bilaterally with symmetric chest rise and fall

## 2024-08-06 NOTE — OP NOTE
[facility] ([facility])    Laparoscopic extensive lysis of adhesions  Right colectomy      Procedure Note    Naila Landin  8/6/2024    Pre-op Diagnosis: Cecal neoplasm    Post-op Diagnosis: Dense adhesions from previous open cholecystectomy, low anterior resection, ventral hernia repair.    Anesthesia: General with Block    Staff:   Circulator: Avtar Mendoza RN  Scrub Person: Richy Espinoza  Assistant: Marilou Dempsey RNFA    Assistant: Marilou Dempsey RNFA was responsible for performing the following activities: Retraction, Suction, Irrigation, Placing Dressing, and Held/Positioned Camera and their skilled assistance was necessary for the success of this case.    This procedure was not performed to treat colon cancer through resection       Estimated Blood Loss: 100ml    Specimens: Right colon        Drains: ESTHER drain    Findings:   As above    Complications: None evident    The procedure was reviewed in the preoperative area with the patient and her daughter who had come in from [place].  We discussed the increased complexity of the procedure by virtue of previous surgical interventions and anticipated adhesions.    The patient been counseled and marked by the stomal therapist.        With antibiotic and DVT prophylaxis we advanced to the operating room where general anesthesia was achieved, block was performed, Lima catheter was utilized, orogastric tube was used, and timeout protocol utilized.    An epigastric incision was made with dense adhesions, regional lysis of adhesions was performed permitting introduction of the GelPort.    Gradually lysis of adhesions was performed these were particularly dense in the epigastric and umbilical region.    Ultimately GelPort was placed and pneumoperitoneum established with satisfactory flow volume curve.    Laparoscopy was without evidence of liver pathology or mass noting significant adhesions in the right upper quadrant  [facility] ENCOUNTER  FAMILY PRACTICE OFFICE VISIT      CHIEF COMPLAINT:    Chief Complaint   Patient presents with   • Follow-up   • Referral       SUBJECTIVE:    Raphael Call is a 49 year old male who presented requesting evaluation for follow-up.     Depression  - has been seeing a LPC for therapy and reports that it is going well, not able to talk about his wife since she won't leave the room and visits are virtual  - had 1 virtual visit with psychiatrist who increased his dose of Prozac and started him on Buspar   - he has noticed mild improvement with the Prozac, requests increase   - saying that Psychiatrist thinks he should have an EKG with the medications that he is taking     Last four PHQ 2/9 Test Results  0: Not at all  1: Several days  2: More than half the days  3: Nearly every day       PHQ9 SCREENING FLOWSHEET 12/31/2020 9/9/2020 8/27/2020   Adult PHQ2 Score 4 2 5   Adult PHQ2 Interpretation Moderate Depression Moderate Depression Moderately Severe Depression   Adult PHQ9 Score 14 13 18   Adult PHQ9 Interpretation Further screening needed No further screening needed Further screening needed   Little interest or pleasure in activity 2 0 2   Feeling down, depressed or hopeless 2 2 3   Trouble falling or staying asleep or sleeping all the time 0 1 0   Feeling tired or having little energy 1 2 3   Poor appetite or overeating 2 2 3   Feeling bad about yourself or that you are a failure or have let yourself or family down 3 2 3   Trouble concentrating on things such as reading hte newspaper or watching TV 1 1 0   Moving or speaking slowly that other people have noticed or the opposite - being so fidgety or restless that you have been moving around a lot more than usual 2 2 3   Thoughts that you would be better off dead or of hurting yourself in some way 1 1 1       Diabetes  - metformin 100mg BID, lantus 26U, and Trulicity 1.5  - was instructed to increase dose of Lantus to 35 units last visit but  did not do this  - hasn't been testing blood sugrs, keeps forgetting   - not eating very well  - saw a diabetic educator in the past but didn't find it helpful, does not want to go again     Heart burn  - using otc pepto tabs and not improved   - gassy and smelly sulfur burps  - not worse since he started the Trulicity   - no nausea or vomiting         REVIEW OF SYSTEMS:    Review of Systems   Constitutional: Positive for fatigue.   HENT: Negative.    Eyes: Negative.    Respiratory: Negative.    Cardiovascular: Negative.    Gastrointestinal: Negative for abdominal pain, blood in stool, constipation, diarrhea, nausea and vomiting.        Heartburn   Endocrine: Positive for polyuria.   Genitourinary: Negative.    Skin: Negative.    Allergic/Immunologic: Negative.    Neurological: Negative.    Psychiatric/Behavioral: Positive for decreased concentration, dysphoric mood and sleep disturbance. Negative for suicidal ideas. The patient is nervous/anxious.           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 (CMS/HCC)   • Ulcer of toe of left foot (CMS/HCC)   • Cellulitis of toe of left foot   • Uncontrolled type 2 diabetes mellitus with hyperglycemia (CMS/HCC)   • BMI 50.0-59.9, adult (CMS/HCC)   • Status post amputation of toe (CMS/HCC)   • Congenital stricture of urethra   • Diabetic polyneuropathy associated with type 2 diabetes mellitus (CMS/HCC)   • Bifascicular block   • Non compliance w medication regimen        MEDICATIONS:    insulin glargine (Lantus SoloStar) 100 UNIT/ML pen-injector  rosuvastatin (CRESTOR) 40 MG tablet  lisinopril (ZESTRIL) 10 MG tablet  busPIRone (BUSPAR) 7.5 MG  from cholecystectomy.    The ileocolic region had adhesions as well.    Gradually using the planes, lysis of adhesions was performed and the right colon was completely mobilized from the duodenum and pancreas and hepatobiliary region.    This was delivered extracorporeally through the GelPort.    The ileum was divided with RYAN, the transverse colon was divided with RYAN.    The mesentery was sequentially divided with Enseal and suture ligation was performed at the ileocolic artery.    An anastomosis was then performed with a RYAN-75, good hemostasis of the common lumen, and completion of the anastomosis with TA 60.  The confluence anastomosis was reinforced with 3-0 Vicryl.    The anastomosis was widely patent and return to the peritoneal cavity.    Given the intensity of the lysis of adhesions, the right upper quadrant 5 mm trocar site was used for 10 flat ESTHER drain placement.    After changing gown, the epigastric fascia was reapproximated with internal retention of oh Vicryl No. 1 single stranded PDS    The incision was irrigated and subcuticular closure was performed.    The final counts were announced as correct.      Caleb Hirsch MD     Date: 8/6/2024  Time: 17:42 EDT   tablet  metformin (GLUCOPHAGE) 1000 MG tablet  blood glucose (FREESTYLE TEST STRIPS) test strip  Insulin Pen Needle (BD Pen Needle Kyra U/F) 32G X 4 MM Misc  blood glucose meter    No current facility-administered medications on file prior to visit.       PAST HISTORIES:     I have reviewed the past medical history, family history, social history, medications and allergies listed in the medical record as obtained by my nursing staff and support staff and agree with their documentation.    OBJECTIVE:    Vitals:    12/31/20 1410   BP: 110/75   Pulse: 89   Resp: 18   Temp: 97.8 °F (36.6 °C)   TempSrc: Temporal   SpO2: 99%   Weight: (!) 163.3 kg   Height: 5' 9\" (1.753 m)     Body mass index is 53.16 kg/m².    PHYSICAL EXAM:   Physical Exam  Vitals signs and nursing note reviewed.   Constitutional:       General: He is not in acute distress.     Appearance: Normal appearance. He is obese. He is not ill-appearing, toxic-appearing or diaphoretic.   HENT:      Head: Normocephalic and atraumatic.      Right Ear: External ear normal.      Left Ear: External ear normal.      Nose: Nose normal.      Mouth/Throat:      Mouth: Mucous membranes are moist.      Pharynx: No oropharyngeal exudate or posterior oropharyngeal erythema.   Eyes:      General: No scleral icterus.        Right eye: No discharge.         Left eye: No discharge.      Conjunctiva/sclera: Conjunctivae normal.      Pupils: Pupils are equal, round, and reactive to light.   Neck:      Musculoskeletal: Neck supple. No neck rigidity.   Cardiovascular:      Rate and Rhythm: Normal rate and regular rhythm.      Pulses: Normal pulses.      Heart sounds: Normal heart sounds. No murmur. No friction rub. No gallop.    Pulmonary:      Effort: Pulmonary effort is normal. No respiratory distress.      Breath sounds: Normal breath sounds. No stridor. No wheezing, rhonchi or rales.   Chest:      Chest wall: No tenderness.   Abdominal:      Comments: Protuberant abdomen     Skin:     General: Skin is warm and dry.   Neurological:      General: No focal deficit present.      Mental Status: He is alert. Mental status is at baseline.      Cranial Nerves: No cranial nerve deficit.   Psychiatric:         Mood and Affect: Mood normal.         Behavior: Behavior normal.         Thought Content: Thought content normal.       LAB RESULTS:   All pertinent laboratory results were reviewed.    ASSESSMENT AND PLAN:   Raphael was seen today for follow-up and referral.    Diagnoses and all orders for this visit:    Uncontrolled type 2 diabetes mellitus with hyperglycemia (CMS/HCC)- not compliant with lifestyle or medication recommendations.  Point of care A1 c of 11.2 today, was 11.9 in September.  - provided written instructions with recommended dose of Lantus and Trulicity  - increase Trulicity to 3 mg weekly and increase Lantus to 35 units nightly  - continue metformin 1000 mg b.i.d.  - encouraged patient to begin checking blood sugars regularly and bring log to next visit  - Crestor 40 mg nightly  -lisinopril 10 mg daily  - due for eye exam, referral placed today  -     Dulaglutide 3 MG/0.5ML Solution Pen-injector; Inject 3 mg into the skin 1 day a week.  -     POCT GLYCOHEMOGLOBIN ANALYZER    Need for vaccination  -     INFLUENZA QUADRIVALENT SPLIT PRES FREE 0.5 ML VACC, IM (FLULAVAL,FLUARIX,FLUZONE)  -     HEP B VACC ADULT 3 DOSE, IM    Essential hypertension- BP at goal  - continue lisinopril 10mg     Moderate episode of recurrent major depressive disorder (CMS/HCC)- following with Behavioral Health, request increased dose of fluoxetine.  - increase fluoxetine from 40 mg to 60 mg and continue BuSpar prescribed by psychiatrist  - continue talk therapy  - staff message sent to patient psychiatrist Dr. Hinkle informing him of change to Prozac dose made today  -     FLUoxetine 60 MG tablet; Take 1 tablet by mouth daily.    Bifascicular block- EKG is essentially unchanged from 2018 and shows  right bundle-branch block with left anterior fascicular block . QTc has decreased from 540 to 486 Has never seen cardiologist.   - given bifascicular block and significant CV risk factors recommended consultation with a cardiologist, referral placed   -     ELECTROCARDIOGRAM 12-LEAD; Future  -     ELECTROCARDIOGRAM 12-LEAD    Heartburn  -     famotidine (Pepcid) 20 MG tablet; Take 1 tablet by mouth 2 times daily.    Non compliance w medication regimen- we discussed this today. He is not sure why he isn't taking meds as prescribed. May be related to depressed mood and not caring.       Health Maintenance Due   Topic Date Due   • Hepatitis B Vaccine (1 of 3 - Risk 3-dose series) 01/18/1992   • Diabetes Eye Exam  03/26/2019   • Influenza Vaccine (1) 09/01/2020   • Diabetes A1C  12/09/2020     Return in about 3 months (around 3/31/2021) for Diabetes f/u.          Candelaria Varela, DO

## 2024-08-06 NOTE — ANESTHESIA PREPROCEDURE EVALUATION
Anesthesia Evaluation     Patient summary reviewed and Nursing notes reviewed   NPO Solid Status: > 8 hours  NPO Liquid Status: > 8 hours           Airway   Mallampati: III  TM distance: >3 FB  Neck ROM: full  Dental    (+) implants    Pulmonary     breath sounds clear to auscultation  Cardiovascular   Exercise tolerance: good (4-7 METS)    Rhythm: regular  Rate: normal        Neuro/Psych  GI/Hepatic/Renal/Endo      Musculoskeletal     Abdominal    Substance History      OB/GYN          Other                    Anesthesia Plan    ASA 2     general with block     (TAP blocks discussed and agreed  Post op N/V prophylaxis)    Anesthetic plan, risks, benefits, and alternatives have been provided, discussed and informed consent has been obtained with: patient.    CODE STATUS:

## 2024-08-06 NOTE — ANESTHESIA POSTPROCEDURE EVALUATION
Patient: Naila Landin    Procedure Summary       Date: 08/06/24 Room / Location:  [facility]    Anesthesia Start: 1546 Anesthesia Stop: 1803    Procedure: LAPAROSCOPIC ASSISTED, POSSIBLY OPEN RIGHT COLECTOMY (Right: Abdomen) Diagnosis:     Surgeons: Caleb Hirsch MD Provider: Dionicio Franklin MD    Anesthesia Type: general with block ASA Status: 2            Anesthesia Type: general with block    Vitals  Vitals Value Taken Time   /79 08/06/24 1758   Temp     Pulse 73 08/06/24 1802   Resp     SpO2 95 % 08/06/24 1802   Vitals shown include unfiled device data.        Post Anesthesia Care and Evaluation    Patient location during evaluation: PACU  Patient participation: complete - patient participated  Level of consciousness: awake and responsive to verbal stimuli  Pain score: 1  Pain management: adequate    Airway patency: patent  Anesthetic complications: No anesthetic complications  PONV Status: none  Cardiovascular status: hemodynamically stable and acceptable  Respiratory status: nonlabored ventilation, acceptable and nasal cannula  Hydration status: acceptable

## 2024-08-06 NOTE — ANESTHESIA PROCEDURE NOTES
Peripheral Block      Patient reassessed immediately prior to procedure    Patient location during procedure: OR  Reason for block: at surgeon's request and post-op pain management  Preanesthetic Checklist  Completed: patient identified, IV checked, site marked, risks and benefits discussed, surgical consent, monitors and equipment checked, pre-op evaluation and timeout performed  Prep:  Pt Position: supine  Sterile barriers:cap, gloves, mask and washed/disinfected hands  Prep: ChloraPrep  Patient monitoring: blood pressure monitoring, continuous pulse oximetry and EKG  Procedure    Sedation: yes  Performed under: general  Guidance:ultrasound guided  Images:still images obtained, printed/placed on chart    Laterality:Bilateral  Block Type:TAP  Injection Technique:single-shot  Needle Type:short-bevel and echogenic  Needle Gauge:20 G  Resistance on Injection: none          Medications  Comment:Block Injection:  LA dose divided between Right and Left block        Post Assessment  Injection Assessment: negative aspiration for heme, incremental injection and no paresthesia on injection  Patient Tolerance:comfortable throughout block  Complications:no  Additional Notes  {CKA TAP 4 QUAD:01529}   Performed by: Geno Berry CRNA

## 2024-08-06 NOTE — INTERVAL H&P NOTE
"[facility] Pre-op    Full history and physical note from office is attached.    /77 (BP Location: Right arm, Patient Position: Lying)   Pulse 74   Temp 97.4 °F (36.3 °C) (Temporal)   Resp 16   Ht 166.4 cm (65.5\")   Wt 87.5 kg (193 lb)   SpO2 95%   BMI 31.63 kg/m²     Immunizations:  Influenza:  UTD  Pneumococcal:  UTD  Tetanus:  UTD    Review of Systems:  Constitutional-- No fever, chills or sweats. No fatigue.  CV-- No chest pain, palpitation or syncope  Resp-- No SOB, cough, hemoptysis  Skin--No rashes or lesions    Physical Exam:  Heart:   Regular rate and rhythm, S1 and S2 normal  Lungs: Clear to auscultation bilaterally, respirations unlabored    LAB Results:  Lab Results   Component Value Date    WBC 8.95 07/18/2024    HGB 14.4 07/18/2024    HCT 44.8 07/18/2024    MCV 96.3 07/18/2024     07/18/2024    GLUCOSE 111 (H) 07/18/2024    BUN 17 07/18/2024    CREATININE 0.77 07/18/2024     07/18/2024    K 4.4 07/18/2024     07/18/2024    CO2 24.0 07/18/2024    CALCIUM 9.1 07/18/2024    ALBUMIN 3.9 07/18/2024    AST 22 07/18/2024    ALT 19 07/18/2024    BILITOT 0.3 07/18/2024    PTT 24.8 03/14/2024    INR 1.01 03/14/2024       Cancer Staging (if applicable)  Cancer Patient: __ yes __no __unknown__N/A; If yes, clinical stage T:__ N:__M:__, stage group or __N/A      Impression: Cecal neoplasm      Plan: LAPAROSCOPIC ASSISTED, POSSIBLY OPEN RIGHT COLECTOMY       Cora Gonzalez, YVONNE   8/6/2024   13:05 EDT  "

## 2024-08-07 LAB
ANION GAP SERPL CALCULATED.3IONS-SCNC: 12 MMOL/L (ref 5–15)
BASOPHILS # BLD AUTO: 0.03 10*3/MM3 (ref 0–0.2)
BASOPHILS NFR BLD AUTO: 0.2 % (ref 0–1.5)
BUN SERPL-MCNC: 14 MG/DL (ref 8–23)
BUN/CREAT SERPL: 17.5 (ref 7–25)
CALCIUM SPEC-SCNC: 8.7 MG/DL (ref 8.6–10.5)
CHLORIDE SERPL-SCNC: 103 MMOL/L (ref 98–107)
CO2 SERPL-SCNC: 21 MMOL/L (ref 22–29)
CREAT SERPL-MCNC: 0.8 MG/DL (ref 0.57–1)
DEPRECATED RDW RBC AUTO: 46.6 FL (ref 37–54)
EGFRCR SERPLBLD CKD-EPI 2021: 73.7 ML/MIN/1.73
EOSINOPHIL # BLD AUTO: 0 10*3/MM3 (ref 0–0.4)
EOSINOPHIL NFR BLD AUTO: 0 % (ref 0.3–6.2)
ERYTHROCYTE [DISTWIDTH] IN BLOOD BY AUTOMATED COUNT: 12.7 % (ref 12.3–15.4)
GLUCOSE BLDC GLUCOMTR-MCNC: 115 MG/DL (ref 70–130)
GLUCOSE BLDC GLUCOMTR-MCNC: 147 MG/DL (ref 70–130)
GLUCOSE BLDC GLUCOMTR-MCNC: 173 MG/DL (ref 70–130)
GLUCOSE BLDC GLUCOMTR-MCNC: 207 MG/DL (ref 70–130)
GLUCOSE SERPL-MCNC: 239 MG/DL (ref 65–99)
HCT VFR BLD AUTO: 44.1 % (ref 34–46.6)
HGB BLD-MCNC: 13.8 G/DL (ref 12–15.9)
IMM GRANULOCYTES # BLD AUTO: 0.06 10*3/MM3 (ref 0–0.05)
IMM GRANULOCYTES NFR BLD AUTO: 0.4 % (ref 0–0.5)
LYMPHOCYTES # BLD AUTO: 1.07 10*3/MM3 (ref 0.7–3.1)
LYMPHOCYTES NFR BLD AUTO: 7.8 % (ref 19.6–45.3)
MCH RBC QN AUTO: 31 PG (ref 26.6–33)
MCHC RBC AUTO-ENTMCNC: 31.3 G/DL (ref 31.5–35.7)
MCV RBC AUTO: 99.1 FL (ref 79–97)
MONOCYTES # BLD AUTO: 0.66 10*3/MM3 (ref 0.1–0.9)
MONOCYTES NFR BLD AUTO: 4.8 % (ref 5–12)
NEUTROPHILS NFR BLD AUTO: 11.83 10*3/MM3 (ref 1.7–7)
NEUTROPHILS NFR BLD AUTO: 86.8 % (ref 42.7–76)
NRBC BLD AUTO-RTO: 0 /100 WBC (ref 0–0.2)
PLATELET # BLD AUTO: 234 10*3/MM3 (ref 140–450)
PMV BLD AUTO: 10.9 FL (ref 6–12)
POTASSIUM SERPL-SCNC: 5 MMOL/L (ref 3.5–5.2)
RBC # BLD AUTO: 4.45 10*6/MM3 (ref 3.77–5.28)
SODIUM SERPL-SCNC: 136 MMOL/L (ref 136–145)
WBC NRBC COR # BLD AUTO: 13.65 10*3/MM3 (ref 3.4–10.8)

## 2024-08-07 PROCEDURE — 82948 REAGENT STRIP/BLOOD GLUCOSE: CPT

## 2024-08-07 PROCEDURE — 80048 BASIC METABOLIC PNL TOTAL CA: CPT | Performed by: COLON & RECTAL SURGERY

## 2024-08-07 PROCEDURE — 97161 PT EVAL LOW COMPLEX 20 MIN: CPT

## 2024-08-07 PROCEDURE — 85025 COMPLETE CBC W/AUTO DIFF WBC: CPT | Performed by: COLON & RECTAL SURGERY

## 2024-08-07 PROCEDURE — 0 LABETALOL 5 MG/ML SOLUTION: Performed by: INTERNAL MEDICINE

## 2024-08-07 PROCEDURE — 25010000002 HEPARIN (PORCINE) PER 1000 UNITS: Performed by: COLON & RECTAL SURGERY

## 2024-08-07 PROCEDURE — 25810000003 SODIUM CHLORIDE 0.9 % SOLUTION: Performed by: NURSE PRACTITIONER

## 2024-08-07 PROCEDURE — 25010000002 SODIUM CHLORIDE 0.9 % WITH KCL 20 MEQ 20-0.9 MEQ/L-% SOLUTION: Performed by: COLON & RECTAL SURGERY

## 2024-08-07 PROCEDURE — 63710000001 INSULIN LISPRO (HUMAN) PER 5 UNITS: Performed by: INTERNAL MEDICINE

## 2024-08-07 PROCEDURE — 25010000002 HYDROMORPHONE 1 MG/ML SOLUTION: Performed by: COLON & RECTAL SURGERY

## 2024-08-07 RX ORDER — CLONIDINE HYDROCHLORIDE 0.1 MG/1
0.1 TABLET ORAL EVERY 12 HOURS PRN
Status: DISCONTINUED | OUTPATIENT
Start: 2024-08-07 | End: 2024-08-12 | Stop reason: HOSPADM

## 2024-08-07 RX ORDER — ONDANSETRON 2 MG/ML
4 INJECTION INTRAMUSCULAR; INTRAVENOUS EVERY 6 HOURS PRN
Status: DISCONTINUED | OUTPATIENT
Start: 2024-08-07 | End: 2024-08-12 | Stop reason: HOSPADM

## 2024-08-07 RX ORDER — GABAPENTIN 100 MG/1
100 CAPSULE ORAL ONCE
Status: COMPLETED | OUTPATIENT
Start: 2024-08-07 | End: 2024-08-07

## 2024-08-07 RX ORDER — SODIUM CHLORIDE 9 MG/ML
100 INJECTION, SOLUTION INTRAVENOUS CONTINUOUS
Status: DISCONTINUED | OUTPATIENT
Start: 2024-08-07 | End: 2024-08-08

## 2024-08-07 RX ADMIN — HYDROCODONE BITARTRATE AND ACETAMINOPHEN 1 TABLET: 7.5; 325 TABLET ORAL at 02:35

## 2024-08-07 RX ADMIN — HYDROCODONE BITARTRATE AND ACETAMINOPHEN 1 TABLET: 7.5; 325 TABLET ORAL at 18:42

## 2024-08-07 RX ADMIN — HYDROMORPHONE HYDROCHLORIDE 1 MG: 1 INJECTION, SOLUTION INTRAMUSCULAR; INTRAVENOUS; SUBCUTANEOUS at 22:06

## 2024-08-07 RX ADMIN — CLONIDINE HYDROCHLORIDE 0.1 MG: 0.1 TABLET ORAL at 12:02

## 2024-08-07 RX ADMIN — INSULIN LISPRO 2 UNITS: 100 INJECTION, SOLUTION INTRAVENOUS; SUBCUTANEOUS at 11:51

## 2024-08-07 RX ADMIN — HYDROMORPHONE HYDROCHLORIDE 1 MG: 1 INJECTION, SOLUTION INTRAMUSCULAR; INTRAVENOUS; SUBCUTANEOUS at 00:31

## 2024-08-07 RX ADMIN — DIAZEPAM 5 MG: 5 TABLET ORAL at 12:02

## 2024-08-07 RX ADMIN — HEPARIN SODIUM 5000 UNITS: 5000 INJECTION INTRAVENOUS; SUBCUTANEOUS at 13:16

## 2024-08-07 RX ADMIN — ACETAMINOPHEN 650 MG: 325 TABLET ORAL at 12:02

## 2024-08-07 RX ADMIN — HYDROMORPHONE HYDROCHLORIDE 1 MG: 1 INJECTION, SOLUTION INTRAMUSCULAR; INTRAVENOUS; SUBCUTANEOUS at 13:56

## 2024-08-07 RX ADMIN — POTASSIUM CHLORIDE AND SODIUM CHLORIDE 100 ML/HR: 900; 150 INJECTION, SOLUTION INTRAVENOUS at 08:22

## 2024-08-07 RX ADMIN — SODIUM CHLORIDE 100 ML/HR: 9 INJECTION, SOLUTION INTRAVENOUS at 14:24

## 2024-08-07 RX ADMIN — HEPARIN SODIUM 5000 UNITS: 5000 INJECTION INTRAVENOUS; SUBCUTANEOUS at 05:35

## 2024-08-07 RX ADMIN — Medication 10 MG: at 00:31

## 2024-08-07 RX ADMIN — Medication 10 MG: at 07:53

## 2024-08-07 RX ADMIN — HYDROMORPHONE HYDROCHLORIDE 1 MG: 1 INJECTION, SOLUTION INTRAMUSCULAR; INTRAVENOUS; SUBCUTANEOUS at 05:35

## 2024-08-07 RX ADMIN — HEPARIN SODIUM 5000 UNITS: 5000 INJECTION INTRAVENOUS; SUBCUTANEOUS at 22:00

## 2024-08-07 RX ADMIN — INSULIN LISPRO 3 UNITS: 100 INJECTION, SOLUTION INTRAVENOUS; SUBCUTANEOUS at 07:53

## 2024-08-07 RX ADMIN — GABAPENTIN 100 MG: 100 CAPSULE ORAL at 12:02

## 2024-08-07 RX ADMIN — SODIUM CHLORIDE 100 ML/HR: 9 INJECTION, SOLUTION INTRAVENOUS at 22:06

## 2024-08-07 NOTE — CASE MANAGEMENT/SOCIAL WORK
Discharge Planning Assessment  [facility]     Patient Name: Naila Landin  MRN: [id number]  Today's Date: 8/7/2024    Admit Date: 8/6/2024    Plan: Goal is home with spouse   Discharge Needs Assessment       Row Name 08/07/24 0919       Living Environment    People in Home spouse    Current Living Arrangements home    Potentially Unsafe Housing Conditions none    In the past 12 months has the electric, gas, oil, or water company threatened to shut off services in your home? No    Primary Care Provided by self    Provides Primary Care For no one    Family Caregiver if Needed child(lalita), adult;spouse    Quality of Family Relationships unable to assess    Able to Return to Prior Arrangements yes       Resource/Environmental Concerns    Transportation Concerns none       Transportation Needs    In the past 12 months, has lack of transportation kept you from medical appointments or from getting medications? no    In the past 12 months, has lack of transportation kept you from meetings, work, or from getting things needed for daily living? No       Food Insecurity    Within the past 12 months, you worried that your food would run out before you got the money to buy more. Never true    Within the past 12 months, the food you bought just didn't last and you didn't have money to get more. Never true       Transition Planning    Patient/Family Anticipates Transition to home with family    Patient/Family Anticipated Services at Transition     Transportation Anticipated family or friend will provide       Discharge Needs Assessment    Readmission Within the Last 30 Days no previous admission in last 30 days    Equipment Currently Used at Home none    Concerns to be Addressed discharge planning;denies needs/concerns at this time    Anticipated Changes Related to Illness none    Equipment Needed After Discharge other (see comments)  TBD                   Discharge Plan       Row Name 08/07/24 0920       Plan     Plan Goal is home with spouse    Patient/Family in Agreement with Plan yes    Plan Comments CM spoke to patient at bedside to initiate discharge planning. Patient lives at home with her spouse in [facility]. Prior to admission, she was independent with ADL's. No DME or home oxygen. She is not current with home health. Her PCP is Albert Rincon. Verified Medicare A&B and [place] [employer] PPO. Her goal is home with spouse. Spouse or daughter will transport. CM will continue to follow.    Final Discharge Disposition Code 01 - home or self-care                  Continued Care and Services - Admitted Since 8/6/2024    No active coordination exists for this encounter.          Demographic Summary       Row Name 08/07/24 0919       General Information    Admission Type inpatient    Arrived From emergency department    Referral Source admission list    Reason for Consult discharge planning    Preferred Language English                   Functional Status       Row Name 08/07/24 0919       Functional Status    Usual Activity Tolerance good    Current Activity Tolerance good       Functional Status, IADL    Medications independent    Meal Preparation independent    Housekeeping independent    Laundry independent    Shopping independent                   Psychosocial    No documentation.                  Abuse/Neglect    No documentation.                  Legal    No documentation.                  Substance Abuse    No documentation.                  Patient Forms    No documentation.                     Amy Vega, RN

## 2024-08-07 NOTE — PLAN OF CARE
Goal Outcome Evaluation:  Plan of Care Reviewed With: patient        Progress: no change  Outcome Evaluation: Pt presents with decreased functional mobility, decreased activity tolerance, and incisional pain. Pt ambulated 100ft with CGA and RW for support. Recommend continued skilled IP PT interventions. Recommend D/C home with assist and HHPT.      Anticipated Discharge Disposition (PT): home with assist, home with home health

## 2024-08-07 NOTE — PROGRESS NOTES
"[facility] ([facility])    Nausea and vomiting last night with NG placement and greater than 600 cc out  Some passage of gas  In chair now, frequent ambulation encouraged      Vital Signs:  Blood pressure 155/64, pulse 79, temperature 97.8 °F (36.6 °C), temperature source Oral, resp. rate 18, height 166.4 cm (65.5\"), weight 87.5 kg (193 lb), SpO2 97%.    Labs past 24 hours:  Lab Results (last 24 hours)       Procedure Component Value Units Date/Time    POC Glucose Once [[id number]]  (Normal) Collected: 08/07/24 1658    Specimen: Blood Updated: 08/07/24 1659     Glucose 115 mg/dL     POC Glucose Once [[id number]]  (Abnormal) Collected: 08/07/24 1119    Specimen: Blood Updated: 08/07/24 1122     Glucose 173 mg/dL     POC Glucose Once [[id number]]  (Abnormal) Collected: 08/07/24 0708    Specimen: Blood Updated: 08/07/24 0710     Glucose 207 mg/dL     Tissue Pathology Exam [[id number]] Collected: 08/06/24 1644    Specimen: Tissue from Omentum; Tissue from Large Intestine, Right / Ascending Colon Updated: 08/07/24 0704    Basic Metabolic Panel [[id number]]  (Abnormal) Collected: 08/07/24 0519    Specimen: Blood Updated: 08/07/24 0626     Glucose 239 mg/dL      BUN 14 mg/dL      Creatinine 0.80 mg/dL      Sodium 136 mmol/L      Potassium 5.0 mmol/L      Chloride 103 mmol/L      CO2 21.0 mmol/L      Calcium 8.7 mg/dL      BUN/Creatinine Ratio 17.5     Anion Gap 12.0 mmol/L      eGFR 73.7 mL/min/1.73     Narrative:      GFR Normal >60  Chronic Kidney Disease <60  Kidney Failure <15    The GFR formula is only valid for adults with stable renal function between ages 18 and 70.    CBC & Differential [[id number]]  (Abnormal) Collected: 08/07/24 0519    Specimen: Blood Updated: 08/07/24 0558    Narrative:      The following orders were created for panel order CBC & Differential.  Procedure                               Abnormality         Status                     ---------                            "    -----------         ------                     CBC Auto Differential[[id number]]        Abnormal            Final result                 Please view results for these tests on the individual orders.    CBC Auto Differential [[id number]]  (Abnormal) Collected: 08/07/24 0519    Specimen: Blood Updated: 08/07/24 0558     WBC 13.65 10*3/mm3      RBC 4.45 10*6/mm3      Hemoglobin 13.8 g/dL      Hematocrit 44.1 %      MCV 99.1 fL      MCH 31.0 pg      MCHC 31.3 g/dL      RDW 12.7 %      RDW-SD 46.6 fl      MPV 10.9 fL      Platelets 234 10*3/mm3      Neutrophil % 86.8 %      Lymphocyte % 7.8 %      Monocyte % 4.8 %      Eosinophil % 0.0 %      Basophil % 0.2 %      Immature Grans % 0.4 %      Neutrophils, Absolute 11.83 10*3/mm3      Lymphocytes, Absolute 1.07 10*3/mm3      Monocytes, Absolute 0.66 10*3/mm3      Eosinophils, Absolute 0.00 10*3/mm3      Basophils, Absolute 0.03 10*3/mm3      Immature Grans, Absolute 0.06 10*3/mm3      nRBC 0.0 /100 WBC             I/O last shift:  I/O this shift:  In: -   Out: 1340 [Urine:650; Emesis/NG output:650; Drains:40]     PHYSICAL EXAM-  NG tube  Not tachycardic  Abdomen moderately distended  Incision okay    Pathology:  Order Name Source Comment Collection Info Order Time   TYPE AND SCREEN   Collected By: Marian Rapp RN 8/6/2024 11:55 AM     Release to patient   Routine Release        TISSUE PATHOLOGY EXAM Omentum  Collected By: Caleb Hirsch MD 8/6/2024  4:50 PM     Release to patient   Routine Release        .    Assessment and Plan:  Ileus not surprising given known adhesions from multiple previous surgeries.  NG with good function  Frequent ambulation encouraged.  A.m. labs.    Caleb Hirsch MD  08/07/24  18:20 EDT

## 2024-08-07 NOTE — THERAPY EVALUATION
Patient Name: Naila Landin  : 1940    MRN: [id number]                              Today's Date: 2024       Admit Date: 2024    Visit Dx:     ICD-10-CM ICD-9-CM   1. Cecal neoplasm  D49.0 239.0     Patient Active Problem List   Diagnosis    Cecal neoplasm    HTN (hypertension)    Hypothyroidism (acquired)    S/P right colectomy, laparoscopic with extensive JESSICA    Elevated hemoglobin A1c     Past Medical History:   Diagnosis Date    Cecal neoplasm     Disease of thyroid gland     Diverticulitis     Hypertension     Urinary frequency      Past Surgical History:   Procedure Laterality Date    ABDOMINAL HERNIA REPAIR      with mesh    APPENDECTOMY      CHOLECYSTECTOMY      COLON RESECTION Right 2024    Procedure: OPEN RIGHT COLECTOMY, LAPAROSCOPIC LYSIS OF ADHESIONS;  Surgeon: Caleb Hirsch MD;  Location: [facility];  Service: General;  Laterality: Right;    COLON SURGERY      resection due to diverticulitis    COLONOSCOPY      serveral    HYSTERECTOMY      KNEE ARTHROPLASTY Right     OTHER SURGICAL HISTORY      bladder stimulator    TONSILLECTOMY        General Information       Row Name 24 1533          Physical Therapy Time and Intention    Document Type evaluation  -AE     Mode of Treatment physical therapy  -AE       Row Name 24 1533          General Information    Patient Profile Reviewed yes  -AE     Prior Level of Function independent:;all household mobility;gait;transfer;bed mobility;ADL's;dressing;bathing  -AE     Existing Precautions/Restrictions fall;other (see comments)  NG to suction; abdominal incision; ESTHER drain  -AE     Barriers to Rehab none identified  -AE       Row Name 24 1533          Living Environment    People in Home spouse  -AE       Row Name 24 1533          Home Main Entrance    Number of Stairs, Main Entrance two  -AE     Stair Railings, Main Entrance railing on left side (ascending)  -AE       Row Name 24 1533          Stairs  Within Home, Primary    Stairs, Within Home, Primary Flight to second level and basement but does not have to use.  -AE     Number of Stairs, Within Home, Primary other (see comments)  -AE       Row Name 08/07/24 1533          Cognition    Orientation Status (Cognition) oriented x 3  -AE       Row Name 08/07/24 1533          Safety Issues, Functional Mobility    Safety Issues Affecting Function (Mobility) insight into deficits/self-awareness;safety precaution awareness;sequencing abilities;awareness of need for assistance  -AE     Impairments Affecting Function (Mobility) balance;endurance/activity tolerance;strength;pain;postural/trunk control  -AE               User Key  (r) = Recorded By, (t) = Taken By, (c) = Cosigned By      Initials Name Provider Type    AE Paulo Jeffery, PT Physical Therapist                   Mobility       Row Name 08/07/24 1536          Bed Mobility    Bed Mobility sidelying-sit;sit-sidelying  -AE     Sidelying-Sit [place] (Bed Mobility) contact guard;1 person assist;verbal cues  -AE     Sit-Sidelying [place] (Bed Mobility) contact guard;verbal cues  -AE     Assistive Device (Bed Mobility) bed rails;head of bed elevated  -AE     Comment, (Bed Mobility) VCs for hand placement and sequencing. Pt educated on log rolling technique to reduce abdominal pain with mobility.  -AE       Row Name 08/07/24 1536          Transfers    Comment, (Transfers) VCs for hand placement and sequencing. No dizziness or LOB noted.  -AE       Row Name 08/07/24 1536          Sit-Stand Transfer    Sit-Stand [place] (Transfers) contact guard;1 person assist;verbal cues  -AE     Assistive Device (Sit-Stand Transfers) walker, front-wheeled  -AE       Row Name 08/07/24 1536          Gait/Stairs (Locomotion)    [place] Level (Gait) contact guard;1 person assist;verbal cues  -AE     Assistive Device (Gait) walker, front-wheeled  -AE     Distance in Feet (Gait) 100  -AE     Deviations/Abnormal  Patterns (Gait) bilateral deviations;marti decreased;gait speed decreased;stride length decreased  -AE     Bilateral Gait Deviations forward flexed posture;heel strike decreased  -AE     Comment, (Gait/Stairs) Pt demo step through gait pattern with slowed marti, forward flexed posture, and decreased step length. Pt required increased cues to improve upright posture and sequencing of AD at times. Further distance limited by fatigue.  -AE               User Key  (r) = Recorded By, (t) = Taken By, (c) = Cosigned By      Initials Name Provider Type    AE Paulo Jeffery, PT Physical Therapist                   Obj/Interventions       Row Name 08/07/24 1540          Range of Motion Comprehensive    General Range of Motion bilateral lower extremity ROM WFL  -AE       Row Name 08/07/24 1540          Strength Comprehensive (MMT)    General Manual Muscle Testing (MMT) Assessment lower extremity strength deficits identified  -AE     Comment, General Manual Muscle Testing (MMT) Assessment BLE grossly 4-/5; did not formally assess d/t abdominal pain  -AE       Row Name 08/07/24 1540          Balance    Balance Assessment sitting static balance;sitting dynamic balance;sit to stand dynamic balance;standing static balance;standing dynamic balance  -AE     Static Sitting Balance standby assist  -AE     Dynamic Sitting Balance contact guard  -AE     Position, Sitting Balance unsupported;sitting edge of bed  -AE     Sit to Stand Dynamic Balance contact guard;1-person assist;verbal cues  -AE     Static Standing Balance contact guard  -AE     Dynamic Standing Balance contact guard  -AE     Position/Device Used, Standing Balance supported;walker, front-wheeled  -AE       Row Name 08/07/24 1540          Sensory Assessment (Somatosensory)    Sensory Assessment (Somatosensory) LE sensation intact  -AE               User Key  (r) = Recorded By, (t) = Taken By, (c) = Cosigned By      Initials Name Provider Type    AE Paulo Jeffery,  PT Physical Therapist                   Goals/Plan       Row Name 08/07/24 1544          Bed Mobility Goal 1 (PT)    Activity/Assistive Device (Bed Mobility Goal 1, PT) sit to supine/supine to sit  -AE     [place] Level/Cues Needed (Bed Mobility Goal 1, PT) modified independence  -AE     Time Frame (Bed Mobility Goal 1, PT) short term goal (STG);5 days  -AE     Progress/Outcomes (Bed Mobility Goal 1, PT) new goal  -AE       Row Name 08/07/24 1545          Transfer Goal 1 (PT)    Activity/Assistive Device (Transfer Goal 1, PT) sit-to-stand/stand-to-sit;bed-to-chair/chair-to-bed  -AE     [place] Level/Cues Needed (Transfer Goal 1, PT) modified independence  -AE     Time Frame (Transfer Goal 1, PT) long term goal (LTG);10 days  -AE     Progress/Outcome (Transfer Goal 1, PT) new goal  -AE       Row Name 08/07/24 1545          Gait Training Goal 1 (PT)    Activity/Assistive Device (Gait Training Goal 1, PT) gait (walking locomotion);assistive device use  -AE     [place] Level (Gait Training Goal 1, PT) modified independence  -AE     Distance (Gait Training Goal 1, PT) 400ft  -AE     Time Frame (Gait Training Goal 1, PT) long term goal (LTG);10 days  -AE     Progress/Outcome (Gait Training Goal 1, PT) new goal  -AE       Row Name 08/07/24 1543          Stairs Goal 1 (PT)    Activity/Assistive Device (Stairs Goal 1, PT) ascending stairs;descending stairs  -AE     [place] Level/Cues Needed (Stairs Goal 1, PT) standby assist  -AE     Number of Stairs (Stairs Goal 1, PT) 2  -AE     Time Frame (Stairs Goal 1, PT) long term goal (LTG);10 days  -AE     Progress/Outcome (Stairs Goal 1, PT) new goal  -AE       Row Name 08/07/24 1549          Therapy Assessment/Plan (PT)    Planned Therapy Interventions (PT) balance training;bed mobility training;gait training;home exercise program;patient/family education;postural re-education;ROM (range of motion);stair training;strengthening;transfer training  -AE                User Key  (r) = Recorded By, (t) = Taken By, (c) = Cosigned By      Initials Name Provider Type    AE Paulo Jeffery, PT Physical Therapist                   Clinical Impression       Row Name 08/07/24 1541          Pain    Pretreatment Pain Rating 7/10  -AE     Posttreatment Pain Rating 7/10  -AE     Pain Location incisional  -AE     Pain Location - abdomen  -AE     Pre/Posttreatment Pain Comment tolerated; RN aware and managing  -AE     Pain Intervention(s) Repositioned;Ambulation/increased activity;Medication (See MAR)  -AE       Row Name 08/07/24 1541          Plan of Care Review    Plan of Care Reviewed With patient  -AE     Progress no change  -AE     Outcome Evaluation Pt presents with decreased functional mobility, decreased activity tolerance, and incisional pain. Pt ambulated 100ft with CGA and RW for support. Recommend continued skilled IP PT interventions. Recommend D/C home with assist and HHPT.  -AE       Row Name 08/07/24 1541          Therapy Assessment/Plan (PT)    Patient/Family Therapy Goals Statement (PT) Home  -AE     Rehab Potential (PT) good, to achieve stated therapy goals  -AE     Criteria for Skilled Interventions Met (PT) yes  -AE     Therapy Frequency (PT) daily  -AE     Predicted Duration of Therapy Intervention (PT) 1 week  -AE       Row Name 08/07/24 1541          Vital Signs    Pre Systolic BP Rehab 164  -AE     Pre Treatment Diastolic BP 82  -AE     Pretreatment Heart Rate (beats/min) 91  -AE     Posttreatment Heart Rate (beats/min) 91  -AE     Pre SpO2 (%) 97  -AE     O2 Delivery Pre Treatment nasal cannula  -AE     O2 Delivery Intra Treatment nasal cannula  -AE     Post SpO2 (%) 96  -AE     O2 Delivery Post Treatment nasal cannula  -AE     Pre Patient Position Supine  -AE     Intra Patient Position Standing  -AE     Post Patient Position Supine  -AE       Row Name 08/07/24 1541          Positioning and Restraints    Pre-Treatment Position in bed  -AE     Post Treatment  Position bed  -AE     In Bed notified nsg;fowlers;call light within reach;encouraged to call for assist;exit alarm on;side rails up x2;with family/caregiver;with nsg;legs elevated;SCD pump applied  -AE               User Key  (r) = Recorded By, (t) = Taken By, (c) = Cosigned By      Initials Name Provider Type    AE Paulo Jeffery, PT Physical Therapist                   Outcome Measures       Row Name 08/07/24 1545 08/07/24 1240       How much help from another person do you currently need...    Turning from your back to your side while in flat bed without using bedrails? 3  -AE 3  -AM    Moving from lying on back to sitting on the side of a flat bed without bedrails? 3  -AE 3  -AM    Moving to and from a bed to a chair (including a wheelchair)? 3  -AE 3  -AM    Standing up from a chair using your arms (e.g., wheelchair, bedside chair)? 3  -AE 3  -AM    Climbing 3-5 steps with a railing? 2  -AE 3  -AM    To walk in hospital room? 3  -AE 3  -AM    AM-PAC 6 Clicks Score (PT) 17  -AE 18  -AM    Highest Level of Mobility Goal 5 --> Static standing  -AE 6 --> Walk 10 steps or more  -AM      Row Name 08/07/24 1045 08/07/24 0756       How much help from another person do you currently need...    Turning from your back to your side while in flat bed without using bedrails? 3  -AM 3  -AM    Moving from lying on back to sitting on the side of a flat bed without bedrails? 3  -AM 3  -AM    Moving to and from a bed to a chair (including a wheelchair)? 3  -AM 3  -AM    Standing up from a chair using your arms (e.g., wheelchair, bedside chair)? 3  -AM 3  -AM    Climbing 3-5 steps with a railing? 3  -AM 3  -AM    To walk in hospital room? 3  -AM 3  -AM    AM-PAC 6 Clicks Score (PT) 18  -AM 18  -AM    Highest Level of Mobility Goal 6 --> Walk 10 steps or more  -AM 6 --> Walk 10 steps or more  -AM      Row Name 08/07/24 1545          Functional Assessment    Outcome Measure Options AM-PAC 6 Clicks Basic Mobility (PT)  -AE                User Key  (r) = Recorded By, (t) = Taken By, (c) = Cosigned By      Initials Name Provider Type    AM Jose Angel Limon, RN Registered Nurse    Paulo Roberts PT Physical Therapist                                 Physical Therapy Education       Title: PT OT SLP Therapies (In Progress)       Topic: Physical Therapy (In Progress)       Point: Mobility training (Done)       Learning Progress Summary             Patient Acceptance, E, VU by AE at 8/7/2024 1313                         Point: Home exercise program (Not Started)       Learner Progress:  Not documented in this visit.              Point: Body mechanics (Done)       Learning Progress Summary             Patient Acceptance, E, VU by AE at 8/7/2024 1313                         Point: Precautions (Done)       Learning Progress Summary             Patient Acceptance, E, VU by AE at 8/7/2024 1313                                         User Key       Initials Effective Dates Name Provider Type Discipline    AE 09/21/21 -  Paulo Jeffery PT Physical Therapist PT                  PT Recommendation and Plan  Planned Therapy Interventions (PT): balance training, bed mobility training, gait training, home exercise program, patient/family education, postural re-education, ROM (range of motion), stair training, strengthening, transfer training  Plan of Care Reviewed With: patient  Progress: no change  Outcome Evaluation: Pt presents with decreased functional mobility, decreased activity tolerance, and incisional pain. Pt ambulated 100ft with CGA and RW for support. Recommend continued skilled IP PT interventions. Recommend D/C home with assist and HHPT.     Time Calculation:   PT Evaluation Complexity  History, PT Evaluation Complexity: 1-2 personal factors and/or comorbidities  Examination of Body Systems (PT Eval Complexity): total of 3 or more elements  Clinical Presentation (PT Evaluation Complexity): stable  Clinical Decision Making (PT Evaluation  Complexity): low complexity  Overall Complexity (PT Evaluation Complexity): low complexity     PT Charges       Row Name 08/07/24 1545             Time Calculation    Start Time 1313  -AE      PT Received On 08/07/24  -AE      PT Goal Re-Cert Due Date 08/17/24  -AE         Untimed Charges    PT Eval/Re-eval Minutes 50  -AE         Total Minutes    Untimed Charges Total Minutes 50  -AE       Total Minutes 50  -AE                User Key  (r) = Recorded By, (t) = Taken By, (c) = Cosigned By      Initials Name Provider Type    AE Paulo Jeffery, PT Physical Therapist                  Therapy Charges for Today       Code Description Service Date Service Provider Modifiers Qty    19282057837 HC PT EVAL LOW COMPLEXITY 4 8/7/2024 Paulo Jeffery, PT GP 1            PT G-Codes  Outcome Measure Options: AM-PAC 6 Clicks Basic Mobility (PT)  AM-PAC 6 Clicks Score (PT): 17  PT Discharge Summary  Anticipated Discharge Disposition (PT): home with assist, home with home health    Paulo Jeffery PT  8/7/2024

## 2024-08-07 NOTE — PROGRESS NOTES
"IM progress note      Naila Landin  [id number]  1940     LOS: 1 day     Attending: Caleb Hirsch MD    Primary Care Provider: Albert Rincon MD      Chief Complaint/Reason for visit:  Abd pain    Subjective   Doing ok. Moderate sharp abd pain. NGT to LWS, c/o sore throat. Hasn't ambulated yet. Hasn't voided since tam dc'd. No flatus or BM. Denies f/c/n/v/sob/cp.    Objective     Vital Signs    Visit Vitals  /75 (BP Location: Right arm, Patient Position: Lying)   Pulse 88   Temp 96.8 °F (36 °C) (Oral)   Resp 18   Ht 166.4 cm (65.5\")   Wt 87.5 kg (193 lb)   SpO2 96%   BMI 31.63 kg/m²     Temp (24hrs), Av.3 °F (36.3 °C), Min:95.7 °F (35.4 °C), Max:97.8 °F (36.6 °C)      Nutrition: Clears    Respiratory: per NC, wean as able     Physical Exam:     General Appearance:    Alert, cooperative, in no acute distress   Head:    Normocephalic, without obvious abnormality, atraumatic    Lungs:     Normal effort, symmetric chest rise, no crepitus, clear to      auscultation bilaterally             Heart:    Regular rhythm and normal rate, normal S1 and S2   Abdomen:      Soft, clean incisions.  ESTHER with serosanguineous output.    Extremities:   No clubbing, cyanosis or edema.  No deformities.    Pulses:   Pulses palpable and equal bilaterally   Skin:   No bleeding, bruising or rash   Neurologic:   Moves all extremities with no obvious focal motor deficit.  Cranial nerves 2 - 12 grossly intact     Results Review:     I reviewed the patient's new clinical results.   Results from last 7 days   Lab Units 24  0519   WBC 10*3/mm3 13.65*   HEMOGLOBIN g/dL 13.8   HEMATOCRIT % 44.1   PLATELETS 10*3/mm3 234     Results from last 7 days   Lab Units 24  0519   SODIUM mmol/L 136   POTASSIUM mmol/L 5.0   CHLORIDE mmol/L 103   CO2 mmol/L 21.0*   BUN mg/dL 14   CREATININE mg/dL 0.80   CALCIUM mg/dL 8.7   GLUCOSE mg/dL 239*     I reviewed the patient's new imaging including images and reports.    All " medications reviewed.   acetaminophen, 650 mg, Oral, Q8H  alvimopan, 12 mg, Oral, BID  gabapentin, 100 mg, Oral, BID  heparin (porcine), 5,000 Units, Subcutaneous, Q8H  insulin lispro, 2-7 Units, Subcutaneous, 4x Daily AC & at Bedtime  latanoprost, 1 drop, Both Eyes, Nightly  levothyroxine, 25 mcg, Oral, Q AM  metoprolol succinate XL, 100 mg, Oral, Nightly        Assessment & Plan     S/P right colectomy, laparoscopic with extensive JESSICA    Cecal neoplasm    HTN (hypertension)    Hypothyroidism (acquired)    Elevated hemoglobin A1c      Plan  1. Ambulation  2. Pain control-prns   3. IS-encouraged  4. DVT proph- mechs/heparin  5. Bowel regimen  6. Clear diet. Continue IVF   7. Monitor post-op labs  8. DC planning for home     - Hypertension:  Resume home medications as appropriate, formulary substitution when indicated.  Holding parameters.  Prn medications for elevated blood pressure.     -Hypothyroidism:  Resume replacement.      YVONNE Haskins  08/07/24  12:32 EDT

## 2024-08-07 NOTE — H&P
Admission HP     Patient Name: Naila Landin  MRN: [id number]  : 1940  DOS: 2024    Attending: Caleb Hirsch MD    Primary Care Provider: Albert Rincon MD      Patient Care Team:  Albert Rincon MD as PCP - General  Albert Rincon MD as PCP - Family Medicine    Chief complaint: Cecal neoplasm.    Subjective   Patient is a pleasant 84 y.o. female presented for scheduled surgery by Dr. Hirsch.    Patient has had polyps found on colonoscopy in the ileocecal area.  She has had a history of diarrhea and change in bowel habits.  Symptoms have happened intermittently.    She was seen by Dr. Hirsch and opted to proceed with surgery.    She underwent laparoscopic extensive lyse of adhesions and right colectomy, surgery was done under general adhesional block, was tolerated well, she has been admitted for the management.    I saw her in PACU where she is drowsy but appropriate.  She has no nausea, vomiting, or shortness of breath.  Pain control is acceptable.    Allergies:  No Known Allergies    Meds:  Medications Prior to Admission   Medication Sig Dispense Refill Last Dose    latanoprost (XALATAN) 0.005 % ophthalmic solution Administer 1 drop to both eyes Every Night.   2024 at 2200    levothyroxine (SYNTHROID, LEVOTHROID) 25 MCG tablet Take 1 tablet by mouth Every Morning.   2024 at 0700    metoprolol succinate XL (TOPROL-XL) 100 MG 24 hr tablet Take 1 tablet by mouth Every Night.   2024 at 1800    multivitamin with minerals (MULTIVITAMIN ADULT PO) Take 1 tablet by mouth Daily.   2024        Past Medical History:   Diagnosis Date    Cecal neoplasm     Disease of thyroid gland     Diverticulitis     Hypertension     Urinary frequency      Past Surgical History:   Procedure Laterality Date    ABDOMINAL HERNIA REPAIR      with mesh    APPENDECTOMY      CHOLECYSTECTOMY      COLON SURGERY  2012    resection due to diverticulitis    COLONOSCOPY      serveral    HYSTERECTOMY    "   KNEE ARTHROPLASTY Right 2024    OTHER SURGICAL HISTORY      bladder stimulator    TONSILLECTOMY       History reviewed. No pertinent family history.  Social History     Tobacco Use    Smoking status: Never     Passive exposure: Past    Smokeless tobacco: Never   Vaping Use    Vaping status: Never Used   Substance Use Topics    Alcohol use: Never    Drug use: Never       Review of Systems  Pertinent items are noted in HPI    Vital Signs  /89 (BP Location: Right arm, Patient Position: Lying)   Pulse 90   Temp 97.8 °F (36.6 °C) (Oral)   Resp 18   Ht 166.4 cm (65.5\")   Wt 87.5 kg (193 lb)   SpO2 95%   BMI 31.63 kg/m²     Physical Exam:    General Appearance:    Alert, cooperative, in no acute distress   Head:    Normocephalic, without obvious abnormality, atraumatic   Eyes:            Lids and lashes normal, conjunctivae and sclerae normal, no   icterus, no pallor, corneas clear   Ears:    Ears appear intact with no abnormalities noted   Throat:   No oral lesions, no thrush, oral mucosa moist   Neck:   No adenopathy, supple, trachea midline, no thyromegaly         Lungs:     Clear to auscultation,respirations regular, even and       unlabored. No wheezes or rales.    Heart:    Regular rhythm and normal rate, normal S1 and S2, no murmur    Abdomen:      Soft, clean incisions.  ESTHER with serosanguineous output.   Genitalia:    Deferred   Extremities:   Moves all extremities well, no edema, no cyanosis, no              redness   Pulses:   Pulses palpable and equal bilaterally   Skin:   No bleeding, bruising or rash   Neurologic:   Cranial nerves 2 - 12 grossly intact, no gross motor deficit.           Invalid input(s): \"NEUTOPHILPCT\"        Invalid input(s): \"LABALBU\", \"PROT\"  Lab Results   Component Value Date    HGBA1C 6.20 (H) 07/18/2024      Latest Reference Range & Units 07/18/24 13:47   WBC 3.40 - 10.80 10*3/mm3 8.95   RBC 3.77 - 5.28 10*6/mm3 4.65   Hemoglobin 12.0 - 15.9 g/dL 14.4   Hematocrit " 34.0 - 46.6 % 44.8   Platelets 140 - 450 10*3/mm3 246   RDW 12.3 - 15.4 % 12.4   MCV 79.0 - 97.0 fL 96.3   MCH 26.6 - 33.0 pg 31.0   MCHC 31.5 - 35.7 g/dL 32.1   MPV 6.0 - 12.0 fL 10.6   RDW-SD 37.0 - 54.0 fl 44.3      [facility] Reference Range & Units 07/18/24 13:47   Sodium 136 - 145 mmol/L 139   Potassium 3.5 - 5.2 mmol/L 4.4   Chloride 98 - 107 mmol/L 105   CO2 22.0 - 29.0 mmol/L 24.0   Anion Gap 5.0 - 15.0 mmol/L 10.0   BUN 8 - 23 mg/dL 17   Creatinine 0.57 - 1.00 mg/dL 0.77   BUN/Creatinine Ratio 7.0 - 25.0  22.1   eGFR >60.0 mL/min/1.73 77.1   Glucose 65 - 99 mg/dL 111 (H)   Calcium 8.6 - 10.5 mg/dL 9.1   Alkaline Phosphatase 39 - 117 U/L 106   Total Protein 6.0 - 8.5 g/dL 7.0   Albumin 3.5 - 5.2 g/dL 3.9   Globulin gm/dL 3.1   A/G Ratio g/dL 1.3   AST (SGOT) 1 - 32 U/L 22   ALT (SGPT) 1 - 33 U/L 19   (H): Data is abnormally high    Assessment and Plan:       S/P right colectomy, laparoscopic with extensive JESSICA    Cecal neoplasm    HTN (hypertension)    Hypothyroidism (acquired)      Plan  1. Ambulation, several times daily  2. Pain control-prns   3. IS-encourage  4. DVT proph- Mechanical, subcutaneous heparin  5. Bowel regimen, alvimopan  6. Resume home medications as appropriate  7. Monitor post-op labs  8. DC planning   9. Diet, Clears, advance diet as tolerated.  IVF initially, monitor volume status.    - Hypertension:  Resume home medications as appropriate, formulary substitution when indicated.  Holding parameters.  Prn medications for elevated blood pressure.    -Hypothyroidism:  Resume replacement.      Dragon disclaimer:  Part of this encounter note is an electronic transcription/translation of spoken language to printed text. The electronic translation of spoken language may permit erroneous, or at times, nonsensical words or phrases to be inadvertently transcribed; Although I have reviewed the note for such errors, some may still exist.    David Amaya MD  08/06/24  23:35 EDT

## 2024-08-08 ENCOUNTER — APPOINTMENT (OUTPATIENT)
Dept: GENERAL RADIOLOGY | Facility: HOSPITAL | Age: 84
DRG: 331 | End: 2024-08-08
Payer: MEDICARE

## 2024-08-08 ENCOUNTER — APPOINTMENT (OUTPATIENT)
Dept: CT IMAGING | Facility: HOSPITAL | Age: 84
DRG: 331 | End: 2024-08-08
Payer: MEDICARE

## 2024-08-08 LAB
ALBUMIN SERPL-MCNC: 3.9 G/DL (ref 3.5–5.2)
ALBUMIN SERPL-MCNC: 3.9 G/DL (ref 3.5–5.2)
ALBUMIN/GLOB SERPL: 1.5 G/DL
ALP SERPL-CCNC: 89 U/L (ref 39–117)
ALP SERPL-CCNC: 89 U/L (ref 39–117)
ALT SERPL W P-5'-P-CCNC: 17 U/L (ref 1–33)
ALT SERPL W P-5'-P-CCNC: 17 U/L (ref 1–33)
ANION GAP SERPL CALCULATED.3IONS-SCNC: 10 MMOL/L (ref 5–15)
ANION GAP SERPL CALCULATED.3IONS-SCNC: 11 MMOL/L (ref 5–15)
AST SERPL-CCNC: 19 U/L (ref 1–32)
AST SERPL-CCNC: 19 U/L (ref 1–32)
BASOPHILS # BLD AUTO: 0.05 10*3/MM3 (ref 0–0.2)
BASOPHILS # BLD AUTO: 0.06 10*3/MM3 (ref 0–0.2)
BASOPHILS NFR BLD AUTO: 0.3 % (ref 0–1.5)
BASOPHILS NFR BLD AUTO: 0.4 % (ref 0–1.5)
BILIRUB CONJ SERPL-MCNC: 0.2 MG/DL (ref 0–0.3)
BILIRUB INDIRECT SERPL-MCNC: 0.5 MG/DL
BILIRUB SERPL-MCNC: 0.7 MG/DL (ref 0–1.2)
BILIRUB SERPL-MCNC: 0.7 MG/DL (ref 0–1.2)
BUN SERPL-MCNC: 11 MG/DL (ref 8–23)
BUN SERPL-MCNC: 13 MG/DL (ref 8–23)
BUN/CREAT SERPL: 16.9 (ref 7–25)
BUN/CREAT SERPL: 19.7 (ref 7–25)
CA-I SERPL ISE-MCNC: 1.19 MMOL/L (ref 1.12–1.32)
CALCIUM SPEC-SCNC: 8.3 MG/DL (ref 8.6–10.5)
CALCIUM SPEC-SCNC: 8.5 MG/DL (ref 8.6–10.5)
CHLORIDE SERPL-SCNC: 103 MMOL/L (ref 98–107)
CHLORIDE SERPL-SCNC: 105 MMOL/L (ref 98–107)
CHOLEST SERPL-MCNC: 132 MG/DL (ref 0–200)
CO2 SERPL-SCNC: 25 MMOL/L (ref 22–29)
CO2 SERPL-SCNC: 28 MMOL/L (ref 22–29)
CREAT SERPL-MCNC: 0.65 MG/DL (ref 0.57–1)
CREAT SERPL-MCNC: 0.66 MG/DL (ref 0.57–1)
CRP SERPL-MCNC: 1.94 MG/DL (ref 0–0.5)
DEPRECATED RDW RBC AUTO: 47.1 FL (ref 37–54)
DEPRECATED RDW RBC AUTO: 47.8 FL (ref 37–54)
EGFRCR SERPLBLD CKD-EPI 2021: 87.7 ML/MIN/1.73
EGFRCR SERPLBLD CKD-EPI 2021: 88 ML/MIN/1.73
EOSINOPHIL # BLD AUTO: 0.02 10*3/MM3 (ref 0–0.4)
EOSINOPHIL # BLD AUTO: 0.03 10*3/MM3 (ref 0–0.4)
EOSINOPHIL NFR BLD AUTO: 0.1 % (ref 0.3–6.2)
EOSINOPHIL NFR BLD AUTO: 0.2 % (ref 0.3–6.2)
ERYTHROCYTE [DISTWIDTH] IN BLOOD BY AUTOMATED COUNT: 13.1 % (ref 12.3–15.4)
ERYTHROCYTE [DISTWIDTH] IN BLOOD BY AUTOMATED COUNT: 13.2 % (ref 12.3–15.4)
GLOBULIN UR ELPH-MCNC: 2.6 GM/DL
GLUCOSE BLDC GLUCOMTR-MCNC: 127 MG/DL (ref 70–130)
GLUCOSE BLDC GLUCOMTR-MCNC: 143 MG/DL (ref 70–130)
GLUCOSE BLDC GLUCOMTR-MCNC: 146 MG/DL (ref 70–130)
GLUCOSE BLDC GLUCOMTR-MCNC: 148 MG/DL (ref 70–130)
GLUCOSE SERPL-MCNC: 129 MG/DL (ref 65–99)
GLUCOSE SERPL-MCNC: 148 MG/DL (ref 65–99)
HCT VFR BLD AUTO: 40 % (ref 34–46.6)
HCT VFR BLD AUTO: 40.8 % (ref 34–46.6)
HGB BLD-MCNC: 12.6 G/DL (ref 12–15.9)
HGB BLD-MCNC: 12.8 G/DL (ref 12–15.9)
IMM GRANULOCYTES # BLD AUTO: 0.1 10*3/MM3 (ref 0–0.05)
IMM GRANULOCYTES # BLD AUTO: 0.14 10*3/MM3 (ref 0–0.05)
IMM GRANULOCYTES NFR BLD AUTO: 0.6 % (ref 0–0.5)
IMM GRANULOCYTES NFR BLD AUTO: 0.9 % (ref 0–0.5)
LYMPHOCYTES # BLD AUTO: 2.5 10*3/MM3 (ref 0.7–3.1)
LYMPHOCYTES # BLD AUTO: 2.63 10*3/MM3 (ref 0.7–3.1)
LYMPHOCYTES NFR BLD AUTO: 14.4 % (ref 19.6–45.3)
LYMPHOCYTES NFR BLD AUTO: 16.6 % (ref 19.6–45.3)
MAGNESIUM SERPL-MCNC: 2.2 MG/DL (ref 1.6–2.4)
MCH RBC QN AUTO: 31.1 PG (ref 26.6–33)
MCH RBC QN AUTO: 31.1 PG (ref 26.6–33)
MCHC RBC AUTO-ENTMCNC: 31.4 G/DL (ref 31.5–35.7)
MCHC RBC AUTO-ENTMCNC: 31.5 G/DL (ref 31.5–35.7)
MCV RBC AUTO: 98.8 FL (ref 79–97)
MCV RBC AUTO: 99 FL (ref 79–97)
MONOCYTES # BLD AUTO: 1.34 10*3/MM3 (ref 0.1–0.9)
MONOCYTES # BLD AUTO: 1.64 10*3/MM3 (ref 0.1–0.9)
MONOCYTES NFR BLD AUTO: 8.4 % (ref 5–12)
MONOCYTES NFR BLD AUTO: 9.5 % (ref 5–12)
NEUTROPHILS NFR BLD AUTO: 11.69 10*3/MM3 (ref 1.7–7)
NEUTROPHILS NFR BLD AUTO: 13.04 10*3/MM3 (ref 1.7–7)
NEUTROPHILS NFR BLD AUTO: 73.5 % (ref 42.7–76)
NEUTROPHILS NFR BLD AUTO: 75.1 % (ref 42.7–76)
NRBC BLD AUTO-RTO: 0 /100 WBC (ref 0–0.2)
NRBC BLD AUTO-RTO: 0 /100 WBC (ref 0–0.2)
PHOSPHATE SERPL-MCNC: 2.1 MG/DL (ref 2.5–4.5)
PLATELET # BLD AUTO: 201 10*3/MM3 (ref 140–450)
PLATELET # BLD AUTO: 222 10*3/MM3 (ref 140–450)
PMV BLD AUTO: 10.7 FL (ref 6–12)
PMV BLD AUTO: 11 FL (ref 6–12)
POTASSIUM SERPL-SCNC: 4.1 MMOL/L (ref 3.5–5.2)
POTASSIUM SERPL-SCNC: 4.5 MMOL/L (ref 3.5–5.2)
PREALB SERPL-MCNC: 17.7 MG/DL (ref 20–40)
PROT SERPL-MCNC: 6.5 G/DL (ref 6–8.5)
PROT SERPL-MCNC: 6.5 G/DL (ref 6–8.5)
RBC # BLD AUTO: 4.05 10*6/MM3 (ref 3.77–5.28)
RBC # BLD AUTO: 4.12 10*6/MM3 (ref 3.77–5.28)
SODIUM SERPL-SCNC: 140 MMOL/L (ref 136–145)
SODIUM SERPL-SCNC: 142 MMOL/L (ref 136–145)
TRIGL SERPL-MCNC: 224 MG/DL (ref 0–150)
WBC NRBC COR # BLD AUTO: 15.89 10*3/MM3 (ref 3.4–10.8)
WBC NRBC COR # BLD AUTO: 17.35 10*3/MM3 (ref 3.4–10.8)

## 2024-08-08 PROCEDURE — 84134 ASSAY OF PREALBUMIN: CPT

## 2024-08-08 PROCEDURE — 82330 ASSAY OF CALCIUM: CPT

## 2024-08-08 PROCEDURE — 74022 RADEX COMPL AQT ABD SERIES: CPT

## 2024-08-08 PROCEDURE — 82948 REAGENT STRIP/BLOOD GLUCOSE: CPT

## 2024-08-08 PROCEDURE — C1894 INTRO/SHEATH, NON-LASER: HCPCS

## 2024-08-08 PROCEDURE — 25810000003 SODIUM CHLORIDE 0.9 % SOLUTION: Performed by: INTERNAL MEDICINE

## 2024-08-08 PROCEDURE — 25010000002 HEPARIN (PORCINE) PER 1000 UNITS: Performed by: COLON & RECTAL SURGERY

## 2024-08-08 PROCEDURE — 25010000002 PIPERACILLIN SOD-TAZOBACTAM PER 1 G

## 2024-08-08 PROCEDURE — 25010000002 THIAMINE HCL 200 MG/2ML SOLUTION

## 2024-08-08 PROCEDURE — 25510000001 IOPAMIDOL 61 % SOLUTION: Performed by: COLON & RECTAL SURGERY

## 2024-08-08 PROCEDURE — 80053 COMPREHEN METABOLIC PANEL: CPT | Performed by: COLON & RECTAL SURGERY

## 2024-08-08 PROCEDURE — 85025 COMPLETE CBC W/AUTO DIFF WBC: CPT | Performed by: COLON & RECTAL SURGERY

## 2024-08-08 PROCEDURE — 25010000002 PIPERACILLIN SOD-TAZOBACTAM PER 1 G: Performed by: COLON & RECTAL SURGERY

## 2024-08-08 PROCEDURE — 25810000003 SODIUM CHLORIDE 0.9 % SOLUTION 250 ML FLEX CONT: Performed by: INTERNAL MEDICINE

## 2024-08-08 PROCEDURE — 25010000002 HYDROMORPHONE 1 MG/ML SOLUTION: Performed by: COLON & RECTAL SURGERY

## 2024-08-08 PROCEDURE — 82465 ASSAY BLD/SERUM CHOLESTEROL: CPT

## 2024-08-08 PROCEDURE — C1751 CATH, INF, PER/CENT/MIDLINE: HCPCS

## 2024-08-08 PROCEDURE — 25010000002 CALCIUM GLUCONATE PER 10 ML

## 2024-08-08 PROCEDURE — 25010000002 MAGNESIUM SULFATE PER 500 MG OF MAGNESIUM

## 2024-08-08 PROCEDURE — 84478 ASSAY OF TRIGLYCERIDES: CPT

## 2024-08-08 PROCEDURE — 83735 ASSAY OF MAGNESIUM: CPT

## 2024-08-08 PROCEDURE — 86140 C-REACTIVE PROTEIN: CPT

## 2024-08-08 PROCEDURE — 82248 BILIRUBIN DIRECT: CPT | Performed by: COLON & RECTAL SURGERY

## 2024-08-08 PROCEDURE — 84100 ASSAY OF PHOSPHORUS: CPT

## 2024-08-08 PROCEDURE — 74178 CT ABD&PLV WO CNTR FLWD CNTR: CPT

## 2024-08-08 PROCEDURE — 25010000002 POTASSIUM CHLORIDE PER 2 MEQ OF POTASSIUM

## 2024-08-08 RX ORDER — SODIUM CHLORIDE 9 MG/ML
100 INJECTION, SOLUTION INTRAVENOUS CONTINUOUS
Status: ACTIVE | OUTPATIENT
Start: 2024-08-08 | End: 2024-08-09

## 2024-08-08 RX ORDER — SODIUM CHLORIDE 9 MG/ML
45 INJECTION, SOLUTION INTRAVENOUS CONTINUOUS
Status: DISCONTINUED | OUTPATIENT
Start: 2024-08-09 | End: 2024-08-10

## 2024-08-08 RX ORDER — THIAMINE HYDROCHLORIDE 100 MG/ML
100 INJECTION, SOLUTION INTRAMUSCULAR; INTRAVENOUS DAILY
Status: DISCONTINUED | OUTPATIENT
Start: 2024-08-08 | End: 2024-08-12 | Stop reason: ALTCHOICE

## 2024-08-08 RX ORDER — SODIUM CHLORIDE 9 MG/ML
70 INJECTION, SOLUTION INTRAVENOUS CONTINUOUS
Status: ACTIVE | OUTPATIENT
Start: 2024-08-09 | End: 2024-08-09

## 2024-08-08 RX ORDER — SODIUM CHLORIDE 0.9 % (FLUSH) 0.9 %
10 SYRINGE (ML) INJECTION AS NEEDED
Status: DISCONTINUED | OUTPATIENT
Start: 2024-08-08 | End: 2024-08-12 | Stop reason: HOSPADM

## 2024-08-08 RX ORDER — SODIUM CHLORIDE 0.9 % (FLUSH) 0.9 %
10 SYRINGE (ML) INJECTION EVERY 12 HOURS SCHEDULED
Status: DISCONTINUED | OUTPATIENT
Start: 2024-08-08 | End: 2024-08-12 | Stop reason: HOSPADM

## 2024-08-08 RX ADMIN — HYDROMORPHONE HYDROCHLORIDE 1 MG: 1 INJECTION, SOLUTION INTRAMUSCULAR; INTRAVENOUS; SUBCUTANEOUS at 05:46

## 2024-08-08 RX ADMIN — GABAPENTIN 100 MG: 100 CAPSULE ORAL at 20:29

## 2024-08-08 RX ADMIN — PIPERACILLIN AND TAZOBACTAM 4.5 G: 4; .5 INJECTION, POWDER, FOR SOLUTION INTRAVENOUS at 09:49

## 2024-08-08 RX ADMIN — PIPERACILLIN SODIUM AND TAZOBACTAM SODIUM 3.38 G: 3; .375 INJECTION, POWDER, LYOPHILIZED, FOR SOLUTION INTRAVENOUS at 14:58

## 2024-08-08 RX ADMIN — IOPAMIDOL 85 ML: 612 INJECTION, SOLUTION INTRAVENOUS at 08:50

## 2024-08-08 RX ADMIN — SODIUM CHLORIDE 100 ML/HR: 9 INJECTION, SOLUTION INTRAVENOUS at 14:59

## 2024-08-08 RX ADMIN — FOLIC ACID 1 MG: 5 INJECTION, SOLUTION INTRAMUSCULAR; INTRAVENOUS; SUBCUTANEOUS at 15:46

## 2024-08-08 RX ADMIN — HYDROCODONE BITARTRATE AND ACETAMINOPHEN 1 TABLET: 7.5; 325 TABLET ORAL at 15:46

## 2024-08-08 RX ADMIN — PIPERACILLIN SODIUM AND TAZOBACTAM SODIUM 3.38 G: 3; .375 INJECTION, POWDER, LYOPHILIZED, FOR SOLUTION INTRAVENOUS at 20:47

## 2024-08-08 RX ADMIN — ACETAMINOPHEN 650 MG: 325 TABLET ORAL at 12:21

## 2024-08-08 RX ADMIN — SODIUM CHLORIDE 100 ML/HR: 9 INJECTION, SOLUTION INTRAVENOUS at 20:22

## 2024-08-08 RX ADMIN — ACETAMINOPHEN 650 MG: 325 TABLET ORAL at 20:29

## 2024-08-08 RX ADMIN — ALVIMOPAN 12 MG: 12 CAPSULE ORAL at 09:49

## 2024-08-08 RX ADMIN — GABAPENTIN 100 MG: 100 CAPSULE ORAL at 09:49

## 2024-08-08 RX ADMIN — HEPARIN SODIUM 5000 UNITS: 5000 INJECTION INTRAVENOUS; SUBCUTANEOUS at 20:47

## 2024-08-08 RX ADMIN — Medication 10 ML: at 20:30

## 2024-08-08 RX ADMIN — HYDROCODONE BITARTRATE AND ACETAMINOPHEN 1 TABLET: 7.5; 325 TABLET ORAL at 09:51

## 2024-08-08 RX ADMIN — METOPROLOL SUCCINATE 100 MG: 100 TABLET, EXTENDED RELEASE ORAL at 20:29

## 2024-08-08 RX ADMIN — THIAMINE HYDROCHLORIDE 100 MG: 100 INJECTION, SOLUTION INTRAMUSCULAR; INTRAVENOUS at 15:12

## 2024-08-08 RX ADMIN — DIAZEPAM 5 MG: 5 TABLET ORAL at 12:21

## 2024-08-08 RX ADMIN — ALVIMOPAN 12 MG: 12 CAPSULE ORAL at 20:29

## 2024-08-08 RX ADMIN — Medication 10 MG: at 04:43

## 2024-08-08 RX ADMIN — SMOFLIPID 100 ML: 6; 6; 5; 3 INJECTION, EMULSION INTRAVENOUS at 17:34

## 2024-08-08 RX ADMIN — CLONIDINE HYDROCHLORIDE 0.1 MG: 0.1 TABLET ORAL at 12:27

## 2024-08-08 RX ADMIN — HEPARIN SODIUM 5000 UNITS: 5000 INJECTION INTRAVENOUS; SUBCUTANEOUS at 05:42

## 2024-08-08 RX ADMIN — POTASSIUM PHOSPHATE, MONOBASIC POTASSIUM PHOSPHATE, DIBASIC: 224; 236 INJECTION, SOLUTION, CONCENTRATE INTRAVENOUS at 17:34

## 2024-08-08 RX ADMIN — LATANOPROST 1 DROP: 50 SOLUTION OPHTHALMIC at 20:46

## 2024-08-08 RX ADMIN — SODIUM PHOSPHATE, MONOBASIC, MONOHYDRATE AND SODIUM PHOSPHATE, DIBASIC, ANHYDROUS 15 MMOL: 276; 142 INJECTION, SOLUTION INTRAVENOUS at 20:28

## 2024-08-08 NOTE — PROGRESS NOTES
Pharmacy Consult - TPN Monitoring    Naila Landin is a 84 y.o. female receiving TPN.     Indication: inaccessible GI Tract  Consulting Physician: Dr Amaya    Total Fluid Rate (if stated):  120 mL/hr of IV fluids, including peripheral IVF and TPN    Labs  Results from last 7 days   Lab Units 08/08/24  1231 08/08/24  0327 08/07/24  0519   SODIUM mmol/L 142 140 136   POTASSIUM mmol/L 4.1 4.5 5.0   CHLORIDE mmol/L 103 105 103   CO2 mmol/L 28.0 25.0 21.0*   BUN mg/dL 11 13 14   CREATININE mg/dL 0.65 0.66 0.80   CALCIUM mg/dL 8.5* 8.3* 8.7   BILIRUBIN mg/dL 0.7  0.7  --   --    ALK PHOS U/L 89  89  --   --    ALT (SGPT) U/L 17  17  --   --    AST (SGOT) U/L 19  19  --   --    GLUCOSE mg/dL 148* 129* 239*     Results from last 7 days   Lab Units 08/08/24  1231   MAGNESIUM mg/dL 2.2   PHOSPHORUS mg/dL 2.1*     Results from last 7 days   Lab Units 08/08/24  1231 08/08/24  0327 08/07/24  0519   WBC 10*3/mm3 15.89* 17.35* 13.65*   HEMOGLOBIN g/dL 12.6 12.8 13.8   HEMATOCRIT % 40.0 40.8 44.1   PLATELETS 10*3/mm3 222 201 234     Triglycerides   Date Value Ref Range Status   08/08/2024 224 (H) 0 - 150 mg/dL Final     Comment:     Falsely depressed results may occur on samples drawn from patients receiving N-Acetylcysteine (NAC) or Metamizole.     estimated creatinine clearance is 73.6 mL/min (by C-G formula based on SCr of 0.65 mg/dL).  Intake & Output (last 3 days)         08/05 0701 08/06 0700 08/06 0701 08/07 0700 08/07 0701 08/08 0700 08/08 0701 08/09 0700    I.V. (mL/kg)  2400 (27.4)      IV Piggyback  100      Total Intake(mL/kg)  2500 (28.6)      Urine (mL/kg/hr)  330 650 (0.3)     Emesis/NG output  0 1900     Drains  140 40     Total Output  470 2590     Net  +2030 -2590             Urine Unmeasured Occurrence   1 x     Emesis Unmeasured Occurrence  2 x            Lab Results   Lab Value Date/Time    HGBA1C 6.20 (H) 07/18/2024 1347     Results from last 7 days   Lab Units 08/08/24  1207 08/08/24  0759  08/07/24  2115 08/07/24  1658 08/07/24  1119 08/07/24  0708   GLUCOSE mg/dL 127 148* 147* 115 173* 207*       Dietitian Recommendations  Diet Orders (active) (From admission, onward)       Start     Ordered    08/08/24 1202  NPO Diet NPO Type: Sips with Meds  Diet Effective Now         08/08/24 1201    08/06/24 2200  Snack: Chewing gum x30 minutes three times daily to increase saliva flow and provide gas pain relief. Do not give to ileostomy patients.  3 Times Daily      Comments: Chewing gum x30 minutes three times daily to increase saliva flow and provide gas pain relief.      Do not give to ileostomy patients.    08/06/24 2035                TPN Recommendations:  5.5% dextrose, 6.9% AA @ goal rate of 75mL/hr. Provide 200mL 20% SMOF daily.   Initiate at 25mL/hr and advance by 25mL q8hrs to goal of 75mL/hr  Infused at goal over 24hrs this regimen provides:  1.8L TGV; GIR = 0.79mg/kg/min  1237kcal (76% est needs); 125g protein (100% est needs     Advance diet as clinically indicated/tolerated  Provide Impact AR 2x daily once PO diet appropriate    Current TPN Regimen Recommendation: Dextrose 5.5% / Amino Acid 6.9% initiate at 25 mL/hr, increasing rate by 25 mL/hr every 8 hours until goal rate of 75 mL/hr achieved.  SMOF 20% Lipid Emulsion 200 mL IV every 24 hours.    Current TPN Electrolyte Content:  Na 45 mEq/L  K 40 mEq/L  Ca  5  mEq/L  Mg 8 mEq/L  PO4 15 mmol/L  Cl:Ace 1:1    Assessment/Plan:    1. Patient to start TPN for malnutrition. Macronutrients per dietary recommendation are listed above.       Will advance slowly starting at 25 mL/hour and increase by 25 mL/hour every 8 hours until goal rate for 75 mL/hr is reached.   2. Will begin with standard electrolytes and 1:1 acid base.   3. Exogenous electrolyte replacement BPA orders available.  4. Regular insulin low correction available.   5. Ramp NS rate down as TPN ramps up for a total rate of 120 mL/hr per Dr CARTER  6. Thiamine 100 mg IV daily and Folic Acid 1  mg IV daily  7. Pharmacy will continue to monitor patient's labs and clinical status and make adjustments to TPN in collaboration with RD as indicated.     Thank you for consult,    Marce Conteh, PharmD  8/8/2024  13:51 EDT

## 2024-08-08 NOTE — PROGRESS NOTES
Plan    Repeat CBC at noon to follow/trend leukocytosis  Chest x-ray to evaluate for pneumonia/NG placement  CT of the abdomen pelvis-likely ileus associated with dense abdominal adhesions from previous abdominal surgeries, evaluate for any impediments to progress.  Walked 3 times yesterday, frequent ambulation today.

## 2024-08-08 NOTE — CONSULTS
"                  Clinical Nutrition     Patient Name: Naila Landin  YOB: 1940  MRN: [id number]  Date of Encounter: 08/08/24 12:47 EDT  Admission date: 8/6/2024  Reason for Visit: Physician consult, PN    Assessment   Nutrition Assessment   Admission Diagnosis:  Cecal neoplasm [D49.0]    Problem List:    S/P right colectomy, laparoscopic with extensive JESSICA    Cecal neoplasm    HTN (hypertension)    Hypothyroidism (acquired)    Elevated hemoglobin A1c      PMH:   She  has a past medical history of Cecal neoplasm, Disease of thyroid gland, Diverticulitis, Hypertension, and Urinary frequency.    PSH:  She  has a past surgical history that includes Colon surgery (2012); Other surgical history; Knee Arthroplasty (Right, 2024); Colonoscopy; Hysterectomy; Cholecystectomy; Appendectomy; Abdominal hernia repair; Tonsillectomy; and Colectomy (Right, 8/6/2024).    Applicable Nutrition History:   (8/6) s/p lap JESSICA, R colectomy  (8/8) PICC placement pending    Labs    Labs Reviewed: Yes    Results from last 7 days   Lab Units 08/08/24  0327 08/07/24  0519   GLUCOSE mg/dL 129* 239*   BUN mg/dL 13 14   CREATININE mg/dL 0.66 0.80   SODIUM mmol/L 140 136   CHLORIDE mmol/L 105 103   POTASSIUM mmol/L 4.5 5.0             Invalid input(s): \"PLAT\"    Results from last 7 days   Lab Units 08/08/24  1207 08/08/24  0753 08/07/24  2115 08/07/24  1658 08/07/24  1119 08/07/24  0708   GLUCOSE mg/dL 127 148* 147* 115 173* 207*     Lab Results   Lab Value Date/Time    HGBA1C 6.20 (H) 07/18/2024 1347               Medications    Medications Reviewed: yes    Scheduled Meds:acetaminophen, 650 mg, Oral, Q8H  alvimopan, 12 mg, Oral, BID  gabapentin, 100 mg, Oral, BID  heparin (porcine), 5,000 Units, Subcutaneous, Q8H  insulin regular, 2-7 Units, Subcutaneous, Q6H  latanoprost, 1 drop, Both Eyes, Nightly  levothyroxine, 25 mcg, Oral, Q AM  metoprolol succinate XL, 100 mg, Oral, Nightly  piperacillin-tazobactam, 3.375 g, Intravenous, " "Q8H      Continuous Infusions:lactated ringers, 9 mL/hr, Last Rate: Stopped (08/06/24 1802)  Pharmacy to Dose TPN,   sodium chloride, 100 mL/hr, Last Rate: 100 mL/hr (08/07/24 2206)      PRN Meds:.  Calcium Replacement - Follow Nurse / BPA Driven Protocol    cloNIDine    dextrose    dextrose    diazePAM    glucagon (human recombinant)    HYDROcodone-acetaminophen    HYDROmorphone **AND** naloxone    ibuprofen    Magnesium Standard Dose Replacement - Follow Nurse / BPA Driven Protocol    nitroglycerin    ondansetron    Pharmacy to Dose TPN    phenol    Phosphorus Replacement - Follow Nurse / BPA Driven Protocol    Potassium Replacement - Follow Nurse / BPA Driven Protocol    sodium chloride    Intake/Ouptut 24 hrs (0701 - 0700)   I&O's Reviewed: yes  Intake & Output (last day)         08/07 0701 08/08 0700 08/08 0701 08/09 0700    I.V. (mL/kg)      IV Piggyback      Total Intake(mL/kg)      Urine (mL/kg/hr) 650 (0.3)     Emesis/NG output 1900     Drains 40     Total Output 2590     Net -2590           Urine Unmeasured Occurrence 1 x           Anthropometrics     Height: Height: 166.4 cm (65.5\")  Last Filed Weight: Weight: 87.5 kg (193 lb) (08/06/24 1212)  Method: Weight Method: Standing scale  BMI: BMI (Calculated): 31.6    UBW: 200#  Weight change: weight loss of 7 lbs (3.5%) over 2 week(s)    Significant?  No    Nutrition Focused Physical Exam     Date: 8/8    Pt does not meet criteria for malnutrition diagnosis, at this time.      Subjective   Reported/Observed/Food/Nutrition Related History:     Consult received for PN. Pt is POD#2 s/p R colectomy with JESSICA, NG in place to LWS with 1.9L output documented. Concern for ileus per CRS. Pt reports drinking Impact AR x8 prior to surgery. Reports last meal on Sunday (~4 days ago). Discussed plan for initiating TPN, pt and daughter verbalized understanding. [facility]    Needs Assessment   Date: 8/8    Height used:Height: 166.4 cm (65.5\")  Weights used: 87.5kg " CBW    Estimated Calorie needs: ~ 1625Kcal/day  Method: 17-20 Kcals/KG = 0817-9044  Method:  MSJ (1347) x 1.2 = 1616    Estimated Protein needs: ~ 125 g PRO/day  Method: 1.3-1.5g/Kg = 114-131    Estimated Fluid needs: ~ ml/day   Per clinical status    Current Nutrition Prescription     PO: NPO Diet NPO Type: Sips with Meds  Oral Nutrition Supplement: N/A  Intake: N/A    Assessment & Plan   Nutrition Diagnosis   Date: 8/8 Updated:   Problem Inadequate oral intake    Etiology Altered GI fxn   Signs/Symptoms NPO >48hrs, consult for PN    Status: New    Goal:   Nutrition to support treatment, Advance diet as medically feasible/appropriate, and Initiate PN      Nutrition Intervention      Follow treatment progress, Care plan reviewed, Await begin PO, Nutrition support order placed    TPN Recommendations:  5.5% dextrose, 6.9% AA @ goal rate of 75mL/hr. Provide 200mL 20% SMOF daily.   Initiate at 25mL/hr and advance by 25mL q8hrs to goal of 75mL/hr  Infused at goal over 24hrs this regimen provides:  1.8L TGV; GIR = 0.79mg/kg/min  1237kcal (76% est needs); 125g protein (100% est needs)    Consider weaning IVF as PN advanced to goal  Close electrolyte monitoring and replace per protocol    Advance diet as clinically indicated/tolerated  Provide Impact AR 2x daily once PO diet appropriate    Monitoring/Evaluation:   Per protocol, I&O, Pertinent labs, PN delivery/tolerance, Weight, GI status, Symptoms    Chelly Cerrato, MS,RD,LD  Time Spent: 40min

## 2024-08-08 NOTE — PROGRESS NOTES
"[facility] ([facility])    Significant NG output overnight  Passed a little gas yesterday morning but none since  Dense abdominal adhesions associated with previous surgeries.    Vital Signs:  Blood pressure (!) 183/86, pulse 90, temperature 99.1 °F (37.3 °C), temperature source Oral, resp. rate 18, height 166.4 cm (65.5\"), weight 87.5 kg (193 lb), SpO2 91%.    Labs past 24 hours:  Lab Results (last 24 hours)       Procedure Component Value Units Date/Time    Basic Metabolic Panel [[id number]]  (Abnormal) Collected: 08/08/24 0327    Specimen: Blood Updated: 08/08/24 0412     Glucose 129 mg/dL      BUN 13 mg/dL      Creatinine 0.66 mg/dL      Sodium 140 mmol/L      Potassium 4.5 mmol/L      Chloride 105 mmol/L      CO2 25.0 mmol/L      Calcium 8.3 mg/dL      BUN/Creatinine Ratio 19.7     Anion Gap 10.0 mmol/L      eGFR 87.7 mL/min/1.73     Narrative:      GFR Normal >60  Chronic Kidney Disease <60  Kidney Failure <15    The GFR formula is only valid for adults with stable renal function between ages 18 and 70.    CBC & Differential [[id number]]  (Abnormal) Collected: 08/08/24 0327    Specimen: Blood Updated: 08/08/24 0355    Narrative:      The following orders were created for panel order CBC & Differential.  Procedure                               Abnormality         Status                     ---------                               -----------         ------                     CBC Auto Differential[[id number]]        Abnormal            Final result                 Please view results for these tests on the individual orders.    CBC Auto Differential [[id number]]  (Abnormal) Collected: 08/08/24 0327    Specimen: Blood Updated: 08/08/24 0355     WBC 17.35 10*3/mm3      RBC 4.12 10*6/mm3      Hemoglobin 12.8 g/dL      Hematocrit 40.8 %      MCV 99.0 fL      MCH 31.1 pg      MCHC 31.4 g/dL      RDW 13.2 %      RDW-SD 47.8 fl      MPV 11.0 fL      Platelets 201 10*3/mm3      Neutrophil % " 75.1 %      Lymphocyte % 14.4 %      Monocyte % 9.5 %      Eosinophil % 0.1 %      Basophil % 0.3 %      Immature Grans % 0.6 %      Neutrophils, Absolute 13.04 10*3/mm3      Lymphocytes, Absolute 2.50 10*3/mm3      Monocytes, Absolute 1.64 10*3/mm3      Eosinophils, Absolute 0.02 10*3/mm3      Basophils, Absolute 0.05 10*3/mm3      Immature Grans, Absolute 0.10 10*3/mm3      nRBC 0.0 /100 WBC     POC Glucose Once [[id number]]  (Abnormal) Collected: 08/07/24 2115    Specimen: Blood Updated: 08/07/24 2117     Glucose 147 mg/dL     POC Glucose Once [[id number]]  (Normal) Collected: 08/07/24 1658    Specimen: Blood Updated: 08/07/24 1659     Glucose 115 mg/dL     POC Glucose Once [[id number]]  (Abnormal) Collected: 08/07/24 1119    Specimen: Blood Updated: 08/07/24 1122     Glucose 173 mg/dL             I/O last shift:  No intake/output data recorded.     PHYSICAL EXAM-  Comfortable  Decreased breath sounds in the bases  NG secured in with good function  Abdomen with mild to moderate distention, appropriate incisional tenderness  ESTHER serosanguineous without gross bile.    Pathology:  Order Name Source Comment Collection Info Order Time   TYPE AND SCREEN   Collected By: Marian Rapp RN 8/6/2024 11:55 AM     Release to patient   Routine Release        TISSUE PATHOLOGY EXAM [place]  Collected By: Caleb Hirsch MD 8/6/2024  4:50 PM     Release to patient   Routine Release        .    Assessment and Plan:  D/C tam catheter if urine output good.  Oral pain meds. and limit IV narcotics.  Advance Diet as tolerated.  Up and walking 5 times per day.  Incentive spirometry    Caleb Hirsch MD  08/08/24  07:43 EDT

## 2024-08-08 NOTE — CASE MANAGEMENT/SOCIAL WORK
Discharge Planning Assessment  [facility]     Patient Name: Naila Landin  MRN: [id number]  Today's Date: 8/8/2024    Admit Date: 8/6/2024    Plan: Home with [facility]   Discharge Needs Assessment    No documentation.                  Discharge Plan       Row Name 08/08/24 1530       Plan    Plan Home with [facility]    Patient/Family in Agreement with Plan yes    Plan Comments I have met [facility] Mrs. Landin at the bedside to discuss PT/OT recommendations for home health services.  She is agreeable to submission of referrals to local agencies.  I have confirmed with Tiffany at [facility] that she will accept.  CM will cont to follow the plan of care and provide assistance with discharge planning as recommendations become available.    Final Discharge Disposition Code 06 - home with home health care                  Continued Care and Services - Admitted Since 8/6/2024       Home Medical Care       Service Provider Request Status Selected Services Address Phone Fax Patient Preferred    [facility] [place]  Selected Home Health Services ,  Home Nursing ,  Home Rehabilitation [address] [place], [place] [id number] [phone] --                     Demographic Summary    No documentation.                  Functional Status    No documentation.                  Psychosocial    No documentation.                  Abuse/Neglect    No documentation.                  Legal    No documentation.                  Substance Abuse    No documentation.                  Patient Forms    No documentation.                     Dea Trammell RN

## 2024-08-09 LAB
ANION GAP SERPL CALCULATED.3IONS-SCNC: 7 MMOL/L (ref 5–15)
BASOPHILS # BLD AUTO: 0.05 10*3/MM3 (ref 0–0.2)
BASOPHILS NFR BLD AUTO: 0.5 % (ref 0–1.5)
BUN SERPL-MCNC: 12 MG/DL (ref 8–23)
BUN/CREAT SERPL: 20.7 (ref 7–25)
CALCIUM SPEC-SCNC: 7.8 MG/DL (ref 8.6–10.5)
CHLORIDE SERPL-SCNC: 108 MMOL/L (ref 98–107)
CO2 SERPL-SCNC: 25 MMOL/L (ref 22–29)
CREAT SERPL-MCNC: 0.58 MG/DL (ref 0.57–1)
CYTO UR: NORMAL
DEPRECATED RDW RBC AUTO: 46.2 FL (ref 37–54)
EGFRCR SERPLBLD CKD-EPI 2021: 90.5 ML/MIN/1.73
EOSINOPHIL # BLD AUTO: 0.14 10*3/MM3 (ref 0–0.4)
EOSINOPHIL NFR BLD AUTO: 1.5 % (ref 0.3–6.2)
ERYTHROCYTE [DISTWIDTH] IN BLOOD BY AUTOMATED COUNT: 13 % (ref 12.3–15.4)
GLUCOSE BLDC GLUCOMTR-MCNC: 128 MG/DL (ref 70–130)
GLUCOSE BLDC GLUCOMTR-MCNC: 144 MG/DL (ref 70–130)
GLUCOSE BLDC GLUCOMTR-MCNC: 145 MG/DL (ref 70–130)
GLUCOSE BLDC GLUCOMTR-MCNC: 162 MG/DL (ref 70–130)
GLUCOSE BLDC GLUCOMTR-MCNC: 177 MG/DL (ref 70–130)
GLUCOSE SERPL-MCNC: 150 MG/DL (ref 65–99)
HCT VFR BLD AUTO: 33.7 % (ref 34–46.6)
HGB BLD-MCNC: 10.7 G/DL (ref 12–15.9)
IMM GRANULOCYTES # BLD AUTO: 0.04 10*3/MM3 (ref 0–0.05)
IMM GRANULOCYTES NFR BLD AUTO: 0.4 % (ref 0–0.5)
LAB AP CASE REPORT: NORMAL
LAB AP CLINICAL INFORMATION: NORMAL
LYMPHOCYTES # BLD AUTO: 2.17 10*3/MM3 (ref 0.7–3.1)
LYMPHOCYTES NFR BLD AUTO: 22.8 % (ref 19.6–45.3)
MAGNESIUM SERPL-MCNC: 2.1 MG/DL (ref 1.6–2.4)
MCH RBC QN AUTO: 30.9 PG (ref 26.6–33)
MCHC RBC AUTO-ENTMCNC: 31.8 G/DL (ref 31.5–35.7)
MCV RBC AUTO: 97.4 FL (ref 79–97)
MONOCYTES # BLD AUTO: 0.94 10*3/MM3 (ref 0.1–0.9)
MONOCYTES NFR BLD AUTO: 9.9 % (ref 5–12)
NEUTROPHILS NFR BLD AUTO: 6.19 10*3/MM3 (ref 1.7–7)
NEUTROPHILS NFR BLD AUTO: 64.9 % (ref 42.7–76)
NRBC BLD AUTO-RTO: 0 /100 WBC (ref 0–0.2)
PATH REPORT.FINAL DX SPEC: NORMAL
PATH REPORT.GROSS SPEC: NORMAL
PHOSPHATE SERPL-MCNC: 1.7 MG/DL (ref 2.5–4.5)
PHOSPHATE SERPL-MCNC: 1.9 MG/DL (ref 2.5–4.5)
PLATELET # BLD AUTO: 174 10*3/MM3 (ref 140–450)
PMV BLD AUTO: 11.1 FL (ref 6–12)
POTASSIUM SERPL-SCNC: 3.8 MMOL/L (ref 3.5–5.2)
RBC # BLD AUTO: 3.46 10*6/MM3 (ref 3.77–5.28)
SODIUM SERPL-SCNC: 140 MMOL/L (ref 136–145)
WBC NRBC COR # BLD AUTO: 9.53 10*3/MM3 (ref 3.4–10.8)

## 2024-08-09 PROCEDURE — 85025 COMPLETE CBC W/AUTO DIFF WBC: CPT | Performed by: COLON & RECTAL SURGERY

## 2024-08-09 PROCEDURE — 25010000002 HEPARIN (PORCINE) PER 1000 UNITS: Performed by: COLON & RECTAL SURGERY

## 2024-08-09 PROCEDURE — 84100 ASSAY OF PHOSPHORUS: CPT | Performed by: INTERNAL MEDICINE

## 2024-08-09 PROCEDURE — 25010000002 THIAMINE HCL 200 MG/2ML SOLUTION

## 2024-08-09 PROCEDURE — 25010000002 POTASSIUM CHLORIDE PER 2 MEQ OF POTASSIUM

## 2024-08-09 PROCEDURE — 25010000002 PIPERACILLIN SOD-TAZOBACTAM PER 1 G

## 2024-08-09 PROCEDURE — 80048 BASIC METABOLIC PNL TOTAL CA: CPT | Performed by: COLON & RECTAL SURGERY

## 2024-08-09 PROCEDURE — 82948 REAGENT STRIP/BLOOD GLUCOSE: CPT

## 2024-08-09 PROCEDURE — 25010000002 MAGNESIUM SULFATE PER 500 MG OF MAGNESIUM

## 2024-08-09 PROCEDURE — 83735 ASSAY OF MAGNESIUM: CPT

## 2024-08-09 PROCEDURE — 25810000003 SODIUM CHLORIDE 0.9 % SOLUTION: Performed by: INTERNAL MEDICINE

## 2024-08-09 PROCEDURE — 25010000002 CALCIUM GLUCONATE PER 10 ML

## 2024-08-09 RX ORDER — FENTANYL/ROPIVACAINE/NS/PF 2-625MCG/1
15 PLASTIC BAG, INJECTION (ML) EPIDURAL ONCE
Status: COMPLETED | OUTPATIENT
Start: 2024-08-09 | End: 2024-08-09

## 2024-08-09 RX ADMIN — GABAPENTIN 100 MG: 100 CAPSULE ORAL at 09:02

## 2024-08-09 RX ADMIN — POTASSIUM PHOSPHATE, MONOBASIC POTASSIUM PHOSPHATE, DIBASIC: 224; 236 INJECTION, SOLUTION, CONCENTRATE INTRAVENOUS at 17:38

## 2024-08-09 RX ADMIN — HEPARIN SODIUM 5000 UNITS: 5000 INJECTION INTRAVENOUS; SUBCUTANEOUS at 20:34

## 2024-08-09 RX ADMIN — CLONIDINE HYDROCHLORIDE 0.1 MG: 0.1 TABLET ORAL at 00:19

## 2024-08-09 RX ADMIN — HEPARIN SODIUM 5000 UNITS: 5000 INJECTION INTRAVENOUS; SUBCUTANEOUS at 14:50

## 2024-08-09 RX ADMIN — THIAMINE HYDROCHLORIDE 100 MG: 100 INJECTION, SOLUTION INTRAMUSCULAR; INTRAVENOUS at 09:04

## 2024-08-09 RX ADMIN — SMOFLIPID 100 ML: 6; 6; 5; 3 INJECTION, EMULSION INTRAVENOUS at 00:10

## 2024-08-09 RX ADMIN — Medication 10 ML: at 09:03

## 2024-08-09 RX ADMIN — ACETAMINOPHEN 650 MG: 325 TABLET ORAL at 05:14

## 2024-08-09 RX ADMIN — LEVOTHYROXINE SODIUM 25 MCG: 25 TABLET ORAL at 05:14

## 2024-08-09 RX ADMIN — FOLIC ACID 1 MG: 5 INJECTION, SOLUTION INTRAMUSCULAR; INTRAVENOUS; SUBCUTANEOUS at 09:02

## 2024-08-09 RX ADMIN — PIPERACILLIN SODIUM AND TAZOBACTAM SODIUM 3.38 G: 3; .375 INJECTION, POWDER, LYOPHILIZED, FOR SOLUTION INTRAVENOUS at 20:34

## 2024-08-09 RX ADMIN — ACETAMINOPHEN 650 MG: 325 TABLET ORAL at 20:21

## 2024-08-09 RX ADMIN — PIPERACILLIN SODIUM AND TAZOBACTAM SODIUM 3.38 G: 3; .375 INJECTION, POWDER, LYOPHILIZED, FOR SOLUTION INTRAVENOUS at 14:49

## 2024-08-09 RX ADMIN — ACETAMINOPHEN 650 MG: 325 TABLET ORAL at 12:21

## 2024-08-09 RX ADMIN — ALVIMOPAN 12 MG: 12 CAPSULE ORAL at 20:21

## 2024-08-09 RX ADMIN — ALVIMOPAN 12 MG: 12 CAPSULE ORAL at 09:02

## 2024-08-09 RX ADMIN — DIAZEPAM 5 MG: 5 TABLET ORAL at 12:15

## 2024-08-09 RX ADMIN — METOPROLOL SUCCINATE 100 MG: 100 TABLET, EXTENDED RELEASE ORAL at 20:21

## 2024-08-09 RX ADMIN — Medication 10 ML: at 20:22

## 2024-08-09 RX ADMIN — POTASSIUM PHOSPHATE, MONOBASIC POTASSIUM PHOSPHATE, DIBASIC 15 MMOL: 224; 236 INJECTION, SOLUTION, CONCENTRATE INTRAVENOUS at 17:38

## 2024-08-09 RX ADMIN — HEPARIN SODIUM 5000 UNITS: 5000 INJECTION INTRAVENOUS; SUBCUTANEOUS at 05:15

## 2024-08-09 RX ADMIN — PIPERACILLIN SODIUM AND TAZOBACTAM SODIUM 3.38 G: 3; .375 INJECTION, POWDER, LYOPHILIZED, FOR SOLUTION INTRAVENOUS at 05:14

## 2024-08-09 RX ADMIN — HYDROCODONE BITARTRATE AND ACETAMINOPHEN 1 TABLET: 7.5; 325 TABLET ORAL at 09:02

## 2024-08-09 RX ADMIN — LATANOPROST 1 DROP: 50 SOLUTION OPHTHALMIC at 20:22

## 2024-08-09 RX ADMIN — SMOFLIPID 100 ML: 6; 6; 5; 3 INJECTION, EMULSION INTRAVENOUS at 23:20

## 2024-08-09 RX ADMIN — HYDROCODONE BITARTRATE AND ACETAMINOPHEN 1 TABLET: 7.5; 325 TABLET ORAL at 14:50

## 2024-08-09 RX ADMIN — SODIUM CHLORIDE 70 ML/HR: 9 INJECTION, SOLUTION INTRAVENOUS at 01:57

## 2024-08-09 RX ADMIN — POTASSIUM & SODIUM PHOSPHATES POWDER PACK 280-160-250 MG 2 PACKET: 280-160-250 PACK at 09:02

## 2024-08-09 RX ADMIN — SMOFLIPID 100 ML: 6; 6; 5; 3 INJECTION, EMULSION INTRAVENOUS at 17:38

## 2024-08-09 NOTE — PROGRESS NOTES
"IM progress note      Naila Landin  [id number]  1940     LOS: 3 days     Attending: Caleb Hirsch MD    Primary Care Provider: Albert Rincon MD      Chief Complaint/Reason for visit:  Abd pain    Subjective   Still with intermittent pain.  No nausea or vomiting.  NG tube is still with output, little over 300 mL in canister when seen earlier today.  No flatus or BM yet.  TPN running.  Ambulated this morning and sat in the chair for 3 hours.    Objective          Visit Vitals  /76 (BP Location: Left arm, Patient Position: Lying)   Pulse 71   Temp 96.5 °F (35.8 °C) (Oral)   Resp 18   Ht 166.4 cm (65.5\")   Wt 87.8 kg (193 lb 9.6 oz)   SpO2 91%   BMI 31.73 kg/m²     Temp (24hrs), Av.9 °F (36.6 °C), Min:96.5 °F (35.8 °C), Max:98.4 °F (36.9 °C)      Nutrition: Clears, advised on sips and chips today         Physical Exam:     General Appearance:    Alert, cooperative, in no acute distress   Head:    Normocephalic, without obvious abnormality, atraumatic    Lungs:     Normal effort, symmetric chest rise, no crepitus, clear to      auscultation bilaterally             Heart:    Regular rhythm and normal rate, normal S1 and S2   Abdomen:      Soft,, mild distention clean incisions.  ESTHER with serosanguineous output.    Extremities:   No clubbing, cyanosis or edema.  No deformities.    Pulses:   Pulses palpable and equal bilaterally   Skin:   No bleeding, bruising or rash   Neurologic:   Moves all extremities with no obvious focal motor deficit.  Cranial nerves 2 - 12 grossly intact     Results Review:     I reviewed the patient's new clinical results.   Results from last 7 days   Lab Units 24  0443 24  12324  0327   WBC 10*3/mm3 9.53 15.89* 17.35*   HEMOGLOBIN g/dL 10.7* 12.6 12.8   HEMATOCRIT % 33.7* 40.0 40.8   PLATELETS 10*3/mm3 174 222 201     Results from last 7 days   Lab Units 24  0443 24  1231 24  0327   SODIUM mmol/L 140 142 140   POTASSIUM mmol/L 3.8 " 4.1 4.5   CHLORIDE mmol/L 108* 103 105   CO2 mmol/L 25.0 28.0 25.0   BUN mg/dL 12 11 13   CREATININE mg/dL 0.58 0.65 0.66   CALCIUM mg/dL 7.8* 8.5* 8.3*   BILIRUBIN mg/dL  --  0.7  0.7  --    ALK PHOS U/L  --  89  89  --    ALT (SGPT) U/L  --  17  17  --    AST (SGOT) U/L  --  19  19  --    GLUCOSE mg/dL 150* 148* 129*     I reviewed the patient's new imaging including images and reports.    All medications reviewed.   acetaminophen, 650 mg, Oral, Q8H  alvimopan, 12 mg, Oral, BID  Fat Emul Fish Oil/Plant Based, 100 mL, Intravenous, Q24H (TPN)   And  Fat Emul Fish Oil/Plant Based, 100 mL, Intravenous, Q24H (TPN)  folic acid 1 mg in sodium chloride 0.9 % 50 mL IVPB, 1 mg, Intravenous, Daily  gabapentin, 100 mg, Oral, BID  heparin (porcine), 5,000 Units, Subcutaneous, Q8H  insulin regular, 2-7 Units, Subcutaneous, Q6H  latanoprost, 1 drop, Both Eyes, Nightly  levothyroxine, 25 mcg, Oral, Q AM  metoprolol succinate XL, 100 mg, Oral, Nightly  piperacillin-tazobactam, 3.375 g, Intravenous, Q8H  potassium phosphate, 15 mmol, Intravenous, Once  sodium chloride, 10 mL, Intravenous, Q12H  thiamine (B-1) IV, 100 mg, Intravenous, Daily        Assessment & Plan     S/P right colectomy, laparoscopic with extensive JESSICA    Cecal neoplasm    HTN (hypertension)    Hypothyroidism (acquired)    Elevated hemoglobin A1c  Suspected ileus.  Leukocytosis, resolved    Plan    Continue NG tube to intermittent low wall suction.  Await bowel function return.    1. Ambulation, encouraged, several times daily  2. Pain control-prns   3. IS-encouraged  4. DVT proph- mechs/heparin  5. Bowel regimen  6.  N.p.o. till reliable bowel function and decreased NG tube output.  Continue TPN via PICC line.  7. Monitor labs and replace as indicated.       - Hypertension:  Resume home medications as appropriate, formulary substitution when indicated.  Holding parameters.  Prn medications for elevated blood pressure.     -Hypothyroidism:  Resume  replacement.    Perioperative antibiotics per Dr. Hrisch.      David Amaya MD  08/09/24  15:40 EDT

## 2024-08-09 NOTE — PROGRESS NOTES
"IM progress note      Naila Landin  [id number]  1940     LOS: 2 days     Attending: Caleb Hirsch MD    Primary Care Provider: Albert Rincon MD      Chief Complaint/Reason for visit:  Abd pain    Subjective   Doing better when seen earlier today, still has NG tube with about 300 mL since shift change few hours ago.  Fair pain control.  No nausea.  Objective          Visit Vitals  /86 (BP Location: Left arm, Patient Position: Lying)   Pulse 80   Temp 97.9 °F (36.6 °C) (Oral)   Resp 18   Ht 166.4 cm (65.5\")   Wt 87.5 kg (193 lb)   SpO2 93%   BMI 31.63 kg/m²     Temp (24hrs), Av.7 °F (37.1 °C), Min:97.9 °F (36.6 °C), Max:99.1 °F (37.3 °C)      Nutrition: Clears, advised on sips and chips today         Physical Exam:     General Appearance:    Alert, cooperative, in no acute distress   Head:    Normocephalic, without obvious abnormality, atraumatic    Lungs:     Normal effort, symmetric chest rise, no crepitus, clear to      auscultation bilaterally             Heart:    Regular rhythm and normal rate, normal S1 and S2   Abdomen:      Soft,, mild distention clean incisions.  ESTHER with serosanguineous output.    Extremities:   No clubbing, cyanosis or edema.  No deformities.    Pulses:   Pulses palpable and equal bilaterally   Skin:   No bleeding, bruising or rash   Neurologic:   Moves all extremities with no obvious focal motor deficit.  Cranial nerves 2 - 12 grossly intact     Results Review:     I reviewed the patient's new clinical results.   Results from last 7 days   Lab Units 24  1231 24  0519   WBC 10*3/mm3 15.89* 17.35* 13.65*   HEMOGLOBIN g/dL 12.6 12.8 13.8   HEMATOCRIT % 40.0 40.8 44.1   PLATELETS 10*3/mm3 222 201 234     Results from last 7 days   Lab Units 24  1231 24  03224  0519   SODIUM mmol/L 142 140 136   POTASSIUM mmol/L 4.1 4.5 5.0   CHLORIDE mmol/L 103 105 103   CO2 mmol/L 28.0 25.0 21.0*   BUN mg/dL 11 13 14   CREATININE " mg/dL 0.65 0.66 0.80   CALCIUM mg/dL 8.5* 8.3* 8.7   BILIRUBIN mg/dL 0.7  0.7  --   --    ALK PHOS U/L 89  89  --   --    ALT (SGPT) U/L 17  17  --   --    AST (SGOT) U/L 19  19  --   --    GLUCOSE mg/dL 148* 129* 239*     I reviewed the patient's new imaging including images and reports.    All medications reviewed.   acetaminophen, 650 mg, Oral, Q8H  alvimopan, 12 mg, Oral, BID  Fat Emul Fish Oil/Plant Based, 100 mL, Intravenous, Q24H (TPN)   And  [START ON 8/9/2024] Fat Emul Fish Oil/Plant Based, 100 mL, Intravenous, Q24H (TPN)  folic acid 1 mg in sodium chloride 0.9 % 50 mL IVPB, 1 mg, Intravenous, Daily  gabapentin, 100 mg, Oral, BID  heparin (porcine), 5,000 Units, Subcutaneous, Q8H  insulin regular, 2-7 Units, Subcutaneous, Q6H  latanoprost, 1 drop, Both Eyes, Nightly  levothyroxine, 25 mcg, Oral, Q AM  metoprolol succinate XL, 100 mg, Oral, Nightly  piperacillin-tazobactam, 3.375 g, Intravenous, Q8H  sodium chloride, 10 mL, Intravenous, Q12H  sodium phosphate, 15 mmol, Intravenous, Once  thiamine (B-1) IV, 100 mg, Intravenous, Daily        Assessment & Plan     S/P right colectomy, laparoscopic with extensive JESSICA    Cecal neoplasm    HTN (hypertension)    Hypothyroidism (acquired)    Elevated hemoglobin A1c  Suspected ileus.    Plan    Continue NG tube to intermittent low wall suction.  Await bowel function return.    1. Ambulation, encouraged, several times daily  2. Pain control-prns   3. IS-encouraged  4. DVT proph- mechs/heparin  5. Bowel regimen  6.  N.p.o. for the most part with NG tube.  PICC line and TPN ordered, discussed with Dr. Hirsch and with RN.  7. Monitor post-op labs       - Hypertension:  Resume home medications as appropriate, formulary substitution when indicated.  Holding parameters.  Prn medications for elevated blood pressure.     -Hypothyroidism:  Resume replacement.    Perioperative antibiotics per Dr. Hirsch.      David Amaya MD  08/08/24  21:47 EDT

## 2024-08-09 NOTE — PROGRESS NOTES
Pharmacy Consult - TPN Monitoring  Naila Landin is a 84 y.o. female receiving TPN.     Indication: inaccessible GI Tract  Consulting Physician: Dr. Amaya  Total Fluid Rate (if stated): 120 mL/hr of IV fluids, including peripheral IVF and TPN    Labs  Results from last 7 days   Lab Units 08/09/24  0443 08/08/24  1231 08/08/24  0327   SODIUM mmol/L 140 142 140   POTASSIUM mmol/L 3.8 4.1 4.5   CHLORIDE mmol/L 108* 103 105   CO2 mmol/L 25.0 28.0 25.0   BUN mg/dL 12 11 13   CREATININE mg/dL 0.58 0.65 0.66   CALCIUM mg/dL 7.8* 8.5* 8.3*   BILIRUBIN mg/dL  --  0.7  0.7  --    ALK PHOS U/L  --  89  89  --    ALT (SGPT) U/L  --  17  17  --    AST (SGOT) U/L  --  19  19  --    GLUCOSE mg/dL 150* 148* 129*     Results from last 7 days   Lab Units 08/09/24  0443 08/08/24  1231   MAGNESIUM mg/dL 2.1 2.2   PHOSPHORUS mg/dL 1.9* 2.1*   PREALBUMIN mg/dL  --  17.7*     Results from last 7 days   Lab Units 08/09/24  0443 08/08/24  1231 08/08/24  0327   WBC 10*3/mm3 9.53 15.89* 17.35*   HEMOGLOBIN g/dL 10.7* 12.6 12.8   HEMATOCRIT % 33.7* 40.0 40.8   PLATELETS 10*3/mm3 174 222 201     Triglycerides   Date Value Ref Range Status   08/08/2024 224 (H) 0 - 150 mg/dL Final     Comment:     Falsely depressed results may occur on samples drawn from patients receiving N-Acetylcysteine (NAC) or Metamizole.     estimated creatinine clearance is 82.6 mL/min (by C-G formula based on SCr of 0.58 mg/dL).  Intake & Output (last 3 days)         08/05 0701 08/06 0700 08/06 0701 08/07 0700 08/07 0701 08/08 0700 08/08 0701 08/09 0700    I.V. (mL/kg)  2400 (27.4)      IV Piggyback  100      Total Intake(mL/kg)  2500 (28.6)      Urine (mL/kg/hr)  330 650 (0.3)     Emesis/NG output  0 1900     Drains  140 40     Total Output  470 2590     Net  +2030 -2590             Urine Unmeasured Occurrence   1 x     Emesis Unmeasured Occurrence  2 x            Lab Results   Lab Value Date/Time    HGBA1C 6.20 (H) 07/18/2024 1347     Results from  last 7 days   Lab Units 08/09/24  1147 08/09/24  0522 08/08/24  2344 08/08/24  1624 08/08/24  1207 08/08/24  0753 08/07/24  2115 08/07/24  1658   GLUCOSE mg/dL 144* 177* 146* 143* 127 148* 147* 115     Dietitian Recommendations  Diet Orders (active) (From admission, onward)       Start     Ordered    08/08/24 1202  NPO Diet NPO Type: Sips with Meds  Diet Effective Now         08/08/24 1201    08/06/24 2200  Snack: Chewing gum x30 minutes three times daily to increase saliva flow and provide gas pain relief. Do not give to ileostomy patients.  3 Times Daily      Comments: Chewing gum x30 minutes three times daily to increase saliva flow and provide gas pain relief.      Do not give to ileostomy patients.    08/06/24 2035                Current TPN Regimen Recommendation: Dextrose 5.5% / Amino Acid 6.9% at goal rate of 75 mL/hr. SMOF 20% Lipid Emulsion 200 mL IV every 24 hours.    Current TPN Electrolyte Content:  Na 45 mEq/L  K 40.5 mEq/L  Ca 5 mEq/L  Mg 8 mEq/L  PO4 15 mmol/L  Cl:Ace Max chloride    Assessment/Plan:  Patient receiving PN for malnutrition.   Macronutrients per dietary recommendation as listed above.   Continue with standard electrolytes as listed above. Exogenous electrolyte replacement available as needed.   Blood glucose controlled with regular insulin low dose sliding scale available as needed.   Continue normal saline at 45 mL/hr for a total fluid rate of 120 mL/hr per Dr. Amaya.  Pharmacy will continue to monitor patient's labs and clinical status and make adjustments to PN as indicated.     Thank you,    Elicia Luciano, PharmD, BCPS  8/9/2024  13:14 EDT

## 2024-08-09 NOTE — CONSULTS
Clinical Nutrition     Patient Name: Naila Landin  YOB: 1940  MRN: [id number]  Date of Encounter: 08/09/24 08:06 EDT  Admission date: 8/6/2024  Reason for Visit: Follow-up protocol, PN    Assessment   Nutrition Assessment   Admission Diagnosis:  Cecal neoplasm [D49.0]    Problem List:    S/P right colectomy, laparoscopic with extensive JESSICA    Cecal neoplasm    HTN (hypertension)    Hypothyroidism (acquired)    Elevated hemoglobin A1c      PMH:   She  has a past medical history of Cecal neoplasm, Disease of thyroid gland, Diverticulitis, Hypertension, and Urinary frequency.    PSH:  She  has a past surgical history that includes Colon surgery (2012); Other surgical history; Knee Arthroplasty (Right, 2024); Colonoscopy; Hysterectomy; Cholecystectomy; Appendectomy; Abdominal hernia repair; Tonsillectomy; and Colectomy (Right, 8/6/2024).    Applicable Nutrition History:   (8/6) s/p lap JESSICA, R colectomy  (8/8) PICC placement pending  PN Initiated    Labs    Labs Reviewed: Yes    Results from last 7 days   Lab Units 08/09/24  0443 08/08/24  1231 08/08/24  0327   GLUCOSE mg/dL 150* 148* 129*   BUN mg/dL 12 11 13   CREATININE mg/dL 0.58 0.65 0.66   SODIUM mmol/L 140 142 140   CHLORIDE mmol/L 108* 103 105   POTASSIUM mmol/L 3.8 4.1 4.5   PHOSPHORUS mg/dL 1.9* 2.1*  --    MAGNESIUM mg/dL 2.1 2.2  --    ALT (SGPT) U/L  --  17  17  --        Results from last 7 days   Lab Units 08/08/24  1231   ALBUMIN g/dL 3.9  3.9   PREALBUMIN mg/dL 17.7*   CRP mg/dL 1.94*   IONIZED CALCIUM mmol/L 1.19   CHOLESTEROL mg/dL 132   TRIGLYCERIDES mg/dL 224*       Results from last 7 days   Lab Units 08/09/24  0522 08/08/24  2344 08/08/24  1624 08/08/24  1207 08/08/24  0753 08/07/24  2115   GLUCOSE mg/dL 177* 146* 143* 127 148* 147*     Lab Results   Lab Value Date/Time    HGBA1C 6.20 (H) 07/18/2024 1347               Medications    Medications Reviewed: yes    Scheduled Meds:acetaminophen, 650 mg, Oral,  "Q8H  alvimopan, 12 mg, Oral, BID  Fat Emul Fish Oil/Plant Based, 100 mL, Intravenous, Q24H (TPN)   And  Fat Emul Fish Oil/Plant Based, 100 mL, Intravenous, Q24H (TPN)  folic acid 1 mg in sodium chloride 0.9 % 50 mL IVPB, 1 mg, Intravenous, Daily  gabapentin, 100 mg, Oral, BID  heparin (porcine), 5,000 Units, Subcutaneous, Q8H  insulin regular, 2-7 Units, Subcutaneous, Q6H  latanoprost, 1 drop, Both Eyes, Nightly  levothyroxine, 25 mcg, Oral, Q AM  metoprolol succinate XL, 100 mg, Oral, Nightly  piperacillin-tazobactam, 3.375 g, Intravenous, Q8H  potassium & sodium phosphates, 2 packet, Oral, Once  sodium chloride, 10 mL, Intravenous, Q12H  thiamine (B-1) IV, 100 mg, Intravenous, Daily      Continuous Infusions:Adult Cyclic Central 2-in-1 TPN, , Last Rate: 25 mL/hr at 08/08/24 1734  lactated ringers, 9 mL/hr, Last Rate: Stopped (08/06/24 1802)  Pharmacy to Dose TPN,   sodium chloride, 70 mL/hr, Last Rate: 70 mL/hr (08/09/24 0157)   Followed by  sodium chloride, 45 mL/hr      PRN Meds:.  Calcium Replacement - Follow Nurse / BPA Driven Protocol    cloNIDine    dextrose    dextrose    diazePAM    glucagon (human recombinant)    HYDROcodone-acetaminophen    HYDROmorphone **AND** naloxone    ibuprofen    Magnesium Standard Dose Replacement - Follow Nurse / BPA Driven Protocol    nitroglycerin    ondansetron    Pharmacy to Dose TPN    phenol    Phosphorus Replacement - Follow Nurse / BPA Driven Protocol    Potassium Replacement - Follow Nurse / BPA Driven Protocol    sodium chloride    sodium chloride    Intake/Ouptut 24 hrs (0701 - 0700)   I&O's Reviewed: yes  Intake & Output (last day)         08/08 0701  08/09 0700 08/09 0701  08/10 0700    Urine (mL/kg/hr)      Emesis/NG output 1300     Drains 40     Total Output 1340     Net -1340                 Anthropometrics     Height: Height: 166.4 cm (65.5\")  Last Filed Weight: Weight: 87.8 kg (193 lb 9.6 oz) (08/09/24 7755)  Method: Weight Method: Standing scale  BMI: BMI " "(Calculated): 31.7    UBW: 200#  Weight change: weight loss of 7 lbs (3.5%) over 2 week(s)    Significant?  No    Nutrition Focused Physical Exam     Date: 8/8    Pt does not meet criteria for malnutrition diagnosis, at this time.      Subjective   Reported/Observed/Food/Nutrition Related History:   8/9  PN initiated yesterday evening. Noted to be hypophosphatemic on AM draw. NGT remains to LWS - 1.3L out. C/o sore throat but using chloraseptic spray q 2hrs. Asked about when NG would be removed, educated on usual timeline, verbalized understanding. Chewing gum. LBM 8/6 2/2 bowel prep - No BM or flatus at this time.     8/8  Consult received for PN. Pt is POD#2 s/p R colectomy with JESSICA, NG in place to LWS with 1.9L output documented. Concern for ileus per CRS. Pt reports drinking Impact AR x8 prior to surgery. Reports last meal on Sunday (~4 days ago). Discussed plan for initiating TPN, pt and daughter verbalized understanding. NKFA    Needs Assessment   Date: 8/8    Height used:Height: 166.4 cm (65.5\")  Weights used: 87.5kg CBW    Estimated Calorie needs: ~ 1625Kcal/day  Method: 17-20 Kcals/KG = 5215-1562  Method:  MSJ (1347) x 1.2 = 1616    Estimated Protein needs: ~ 125 g PRO/day  Method: 1.3-1.5g/Kg = 114-131    Estimated Fluid needs: ~ ml/day   Per clinical status    Current Nutrition Prescription     PO: NPO Diet NPO Type: Sips with Meds  Adult Cyclic Central 2-in-1 TPN  Oral Nutrition Supplement: N/A  Intake: N/A    PN Route: PICC  PN: 5.5% dextrose, 6.9% AA @ 75mL/hr               GIR:  0.79mg/kg/min   Lipid: 200mL 20% SMOF daily     PN@ Goal over 24hr to Supply:   Volume 1800 mL/day     Energy 1237 Kcal/day 76 % Est Need   Protein 125 g/day 100 % Est Need     ---------------------------------------------------------------------------  Formula/Rate verified at bedside: Yes  Infusing Rate at time of visit: 50mL/hr    Average Delivery from Charting:  <24hr infusion, no PN documented    PN Volume  mL/day   "   Lipid delivered?        Energy  Kcal/day  % Est Need   Protein  g/day  % Est Need     Assessment & Plan   Nutrition Diagnosis   Date: 8/8 Updated: 8/9  Problem Inadequate oral intake    Etiology Altered GI fxn   Signs/Symptoms NPO >48hrs, consult for PN    Status: Active    Goal:   Nutrition to support treatment, Advance diet as medically feasible/appropriate, and Maintain PN      Nutrition Intervention      Follow treatment progress, Care plan reviewed, Await begin PO, Nutrition support order placed    Continue current PN regimen as ordered  5.5% dextrose, 6.9% AA @ goal rate of 75mL/hr. Provide 200mL 20% SMOF daily.   Infused at goal over 24hrs this regimen provides:  1.8L TGV; GIR = 0.79mg/kg/min  1237kcal (76% est needs); 125g protein (100% est needs)    Consider weaning IVF as PN advanced to goal  Close electrolyte monitoring and replace per protocol  Will advance toward goal dextrose infusion with stable electrolytes    Advance diet as clinically indicated/tolerated  Provide Impact AR 2x daily once PO diet appropriate    Monitoring/Evaluation:   Per protocol, I&O, Pertinent labs, PN delivery/tolerance, Weight, GI status, Symptoms    Chelly Cerrato, MS,RD,LD  Time Spent: 30min

## 2024-08-09 NOTE — PLAN OF CARE
Problem: Adult Inpatient Plan of Care  Goal: Plan of Care Review  Outcome: Ongoing, Progressing  Goal: Patient-Specific Goal (Individualized)  Outcome: Ongoing, Progressing  Goal: Absence of Hospital-Acquired Illness or Injury  Outcome: Ongoing, Progressing  Intervention: Identify and Manage Fall Risk  Description: Perform standard risk assessment on admission using a validated tool or comprehensive approach appropriate to the patient; reassess fall risk frequently, with change in status or transfer to another level of care.  Communicate fall injury risk to interprofessional healthcare team.  Determine need for increased observation, equipment and environmental modification, such as low bed, signage and supportive, nonskid footwear.  Adjust safety measures to individual developmental age, stage and identified risk factors.  Reinforce the importance of safety and physical activity with patient and family.  Perform regular intentional rounding to assess need for position change, pain assessment and personal needs, including assistance with toileting.  Recent Flowsheet Documentation  Taken 8/9/2024 1603 by Kaia Meyer, RN  Safety Promotion/Fall Prevention:   activity supervised   assistive device/personal items within reach   clutter free environment maintained   fall prevention program maintained   nonskid shoes/slippers when out of bed   room organization consistent   safety round/check completed  Taken 8/9/2024 1400 by Kaia Meyer, RN  Safety Promotion/Fall Prevention:   activity supervised   assistive device/personal items within reach   clutter free environment maintained   fall prevention program maintained   nonskid shoes/slippers when out of bed   room organization consistent   safety round/check completed  Taken 8/9/2024 1200 by Kaia Meyer, RN  Safety Promotion/Fall Prevention:   activity supervised   assistive device/personal items within reach   clutter free environment maintained   fall  prevention program maintained   nonskid shoes/slippers when out of bed   room organization consistent   safety round/check completed  Taken 8/9/2024 0859 by Kaia Meyer RN  Safety Promotion/Fall Prevention:   activity supervised   assistive device/personal items within reach   clutter free environment maintained   fall prevention program maintained   nonskid shoes/slippers when out of bed   room organization consistent   safety round/check completed  Intervention: Prevent Skin Injury  Description: Perform a screening for skin injury risk, such as pressure or moisture associated skin damage on admission and at regular intervals throughout hospital stay.  Keep all areas of skin (especially folds) clean and dry.  Maintain adequate skin hydration.  Relieve and redistribute pressure and protect bony prominences; implement measures based on patient-specific risk factors.  Match turning and repositioning schedule to clinical condition.  Encourage weight shift frequently; assist with reposition if unable to complete independently.  Float heels off bed; avoid pressure on the Achilles tendon.  Keep skin free from extended contact with medical devices.  Encourage functional activity and mobility, as early as tolerated.  Use aids (e.g., slide boards, mechanical lift) during transfer.  Recent Flowsheet Documentation  Taken 8/9/2024 1603 by Kaia Meyer RN  Body Position:   position changed independently   lower extremity elevated   neutral body alignment   neutral head position  Taken 8/9/2024 1400 by Kaia Meyer RN  Body Position:   position changed independently   lower extremity elevated   neutral body alignment   neutral head position  Taken 8/9/2024 1200 by Kaia Meyer RN  Body Position:   position changed independently   lower extremity elevated   neutral body alignment   neutral head position  Taken 8/9/2024 1000 by Kaia Meyer RN  Body Position:   position changed independently   lower extremity  elevated   neutral body alignment   neutral head position  Taken 8/9/2024 0859 by Kaia Meyer RN  Body Position:   position changed independently   lower extremity elevated   neutral body alignment   neutral head position  Intervention: Prevent and Manage VTE (Venous Thromboembolism) Risk  Description: Assess for VTE (venous thromboembolism) risk.  Encourage and assist with early ambulation.  Initiate and maintain compression or other therapy, as indicated, based on identified risk in accordance with organizational protocol and provider order.  Encourage both active and passive leg exercises while in bed, if unable to ambulate.  Recent Flowsheet Documentation  Taken 8/9/2024 1603 by Kaia Meyer RN  Activity Management: activity encouraged  Taken 8/9/2024 1400 by Kaia Meyer RN  Activity Management: up in chair  Taken 8/9/2024 1200 by Kaia Meyer RN  Activity Management: up in chair  Range of Motion: active ROM (range of motion) encouraged  Taken 8/9/2024 1000 by Kaia Meyer RN  Activity Management: up in chair  Range of Motion: active ROM (range of motion) encouraged  Taken 8/9/2024 0859 by Kaia Meyer RN  Activity Management:   ambulated to bathroom   up in chair  VTE Prevention/Management:   bilateral   sequential compression devices on  Range of Motion: active ROM (range of motion) encouraged  Intervention: Prevent Infection  Description: Maintain skin and mucous membrane integrity; promote hand, oral and pulmonary hygiene.  Optimize fluid balance, nutrition, sleep and glycemic control to maximize infection resistance.  Identify potential sources of infection early to prevent or mitigate progression of infection (e.g., wound, lines, devices).  Evaluate ongoing need for invasive devices; remove promptly when no longer indicated.  Recent Flowsheet Documentation  Taken 8/9/2024 1603 by Kaia Meyer RN  Infection Prevention:   environmental surveillance performed   equipment surfaces  disinfected   hand hygiene promoted   personal protective equipment utilized   rest/sleep promoted   single patient room provided  Taken 8/9/2024 1400 by Kaia Meyer RN  Infection Prevention:   environmental surveillance performed   equipment surfaces disinfected   hand hygiene promoted   personal protective equipment utilized   rest/sleep promoted   single patient room provided  Taken 8/9/2024 1200 by Kaia Meyer RN  Infection Prevention:   environmental surveillance performed   equipment surfaces disinfected   hand hygiene promoted   personal protective equipment utilized   rest/sleep promoted   single patient room provided  Taken 8/9/2024 1000 by Kaia Meyer RN  Infection Prevention:   environmental surveillance performed   equipment surfaces disinfected   hand hygiene promoted   personal protective equipment utilized   rest/sleep promoted   single patient room provided  Taken 8/9/2024 0859 by Kaia Meyer RN  Infection Prevention:   environmental surveillance performed   equipment surfaces disinfected   hand hygiene promoted   personal protective equipment utilized   rest/sleep promoted   single patient room provided  Goal: Optimal Comfort and Wellbeing  Outcome: Ongoing, Progressing  Intervention: Monitor Pain and Promote Comfort  Description: Assess pain level, treatment efficacy and patient response at regular intervals using a consistent pain scale.  Consider the presence and impact of preexisting chronic pain.  Encourage patient and caregiver involvement in pain assessment, interventions and safety measures.  Recent Flowsheet Documentation  Taken 8/9/2024 0859 by Kaia Meyer RN  Pain Management Interventions: see MAR  Intervention: Provide Person-Centered Care  Description: Use a family-focused approach to care.  Develop trust and rapport by proactively providing information, encouraging questions, addressing concerns and offering reassurance.  Acknowledge emotional response to  hospitalization.  Recognize and utilize personal coping strategies.  Honor spiritual and cultural preferences.  Recent Flowsheet Documentation  Taken 8/9/2024 0488 by Kaia Meyer RN  Trust Relationship/Rapport:   care explained   choices provided   questions answered   questions encouraged   thoughts/feelings acknowledged  Goal: Readiness for Transition of Care  Outcome: Ongoing, Progressing   Goal Outcome Evaluation:

## 2024-08-09 NOTE — PROGRESS NOTES
"[facility] ([facility])    1 bowel movement  NG with 300 out over the shift.  Ambulation with her daughter's assistance    Vital Signs:  Blood pressure 172/82, pulse 72, temperature 97 °F (36.1 °C), temperature source Oral, resp. rate 18, height 166.4 cm (65.5\"), weight 87.8 kg (193 lb 9.6 oz), SpO2 91%.    Labs past 24 hours:  Lab Results (last 24 hours)       Procedure Component Value Units Date/Time    POC Glucose Once [[id number]]  (Normal) Collected: 08/09/24 1557    Specimen: Blood Updated: 08/09/24 1558     Glucose 128 mg/dL     Phosphorus [[id number]]  (Abnormal) Collected: 08/09/24 1423    Specimen: Blood Updated: 08/09/24 1506     Phosphorus 1.7 mg/dL     POC Glucose Once [[id number]]  (Abnormal) Collected: 08/09/24 1147    Specimen: Blood Updated: 08/09/24 1148     Glucose 144 mg/dL     Tissue Pathology Exam [[id number]] Collected: 08/06/24 1644    Specimen: Tissue from Omentum; Tissue from Large Intestine, Right / Ascending Colon Updated: 08/09/24 0744     Case Report --     Surgical Pathology Report                         Case: [id number]                                  Authorizing Provider:  Caleb Hirsch MD         Collected:           08/06/2024 04:44 PM          Ordering Location:     [facility]   Received:            08/07/2024 07:04 AM                                 OR                                                                           Pathologist:           Taurus Guadalupe MD                                                       Specimens:   1) - Omentum, omentum                                                                               2) - Large Intestine, Right / Ascending Colon, right colon                                  Clinical Information --     Cecal neoplasm         Final Diagnosis --     1.  OMENTUM, OMENTECTOMY:  Omentum with no significant histopathologic abnormality    2.  RIGHT COLON, HEMICOLECTOMY:  Tubular " "adenoma  Negative for high-grade dysplasia carcinoma  Twenty lymph nodes, negative for metastatic carcinoma (0/20)         Gross Description --     1. Omentum.  Received in formalin labeled \"omentum\", is a 11.0 x 7.0 x 1.4 cm intact portion of yellow-tan, ragged, lobular soft tissue consistent with omentum.  Serially sectioning reveals yellow, lobulated, minimally hemorrhagic, homogenous glistening cut surfaces.  No masses, lesions or omental caking are identified.  Representative sections are submitted in cassettes 1A-1C.      2. Large Intestine, Right / Ascending Colon.  Received in formalin labeled \"right colon\", is a right hemicolectomy specimen which has the following measurements: Terminal ileum-1.6 cm in length by 2.0 cm in diameter, ascending colon-25.5 cm in length by 2.4 cm in average diameter.  No appendix is identified.  The colon is received with a moderate amount of adherent o'clock adipose.  The serosa is pink-tan, ragged and significant for moderate adhesions.  No perforations identified.  Opening of the colon reveals a 3.8 x 2.9 x 0.7 cm polypoidal mass located within the cecum, is located less than 0.1 cm from the ileocecal valve, is 2.3 cm from the proximal margin and 20.6 cm from the distal margin.  The serosa overlying this mass is inked blue and the closest mesenteric margin is inked orange.  Serially sectioning through the mass reveals pink-tan, friable cut surfaces with no gross invasion identified.  This mass is 0.4 cm from the closest serosal surface and 1.5 cm to be closest mesenteric margin.    Additionally, there are at least 4 sessile polyps identified that range from 0.4-0.9 cm.  These polyps come to within 0.5 cm of the previously mentioned mass and do not approach margins.  The remaining mucosa is white-tan, normally folded and unremarkable.  The wall thickness measures up to 0.4 cm.  Sectioning through the mesocolonic adipose reveals 26 candidate nodes ranging from 0.2-1.1 cm in " greatest dimension.  Representative sections are submitted as follows:  2A: Proximal margin, en face  2B: Distal margin, en face  2C: Representative closest mesenteric margin, en face (2 sections)  2D-2J: Entire mass (2D = mass of ileocecal valve, 2F = closest serosal surface, 2G = mass to a polyp, 2 I = mass to a polyp which is bisected)  2K-2M: Remaining sessile polyps from proximal to distal (2K = 3 intact, 2L = 2 intact, 2M = largest polyp, bisected)  2N: Unremarkable colonic mucosa  2O: 1 candidate node, bisected  2P: 1 candidate node, bisected  2Q: 1 candidate node, bisected  2R: 1 candidate node, bisected  2S: 1 candidate node, bisected  2T: 1 candidate node, bisected  2U: 1 candidate node, bisected  2V: 1 candidate node, bisected  2W: 6 intact candidate nodes  2X: 4 intact candidate nodes  2Y: 4 intact candidate nodes  2Z: 4 intact candidate nodes   MLA     Received free-floating the same container is a 3.3 x 2.2 x 1.2 cm additional unoriented portion of colon that has white-tan, unremarkable mucosa.  A section of the free-floating unoriented portion of colon is submitted in cassette 2AA.       Microscopic Description --     The slides are reviewed and demonstrate histopathologic features supporting the above rendered diagnosis.        Phosphorus [[id number]]  (Abnormal) Collected: 08/09/24 0443    Specimen: Blood Updated: 08/09/24 0604     Phosphorus 1.9 mg/dL     Basic Metabolic Panel [[id number]]  (Abnormal) Collected: 08/09/24 0443    Specimen: Blood Updated: 08/09/24 0559     Glucose 150 mg/dL      BUN 12 mg/dL      Creatinine 0.58 mg/dL      Sodium 140 mmol/L      Potassium 3.8 mmol/L      Comment: Slight hemolysis detected by analyzer. Result may be falsely elevated.        Chloride 108 mmol/L      CO2 25.0 mmol/L      Calcium 7.8 mg/dL      BUN/Creatinine Ratio 20.7     Anion Gap 7.0 mmol/L      eGFR 90.5 mL/min/1.73     Narrative:      GFR Normal >60  Chronic Kidney Disease <60  Kidney Failure  <15    The GFR formula is only valid for adults with stable renal function between ages 18 and 70.    Magnesium [[id number]]  (Normal) Collected: 08/09/24 0443    Specimen: Blood Updated: 08/09/24 0559     Magnesium 2.1 mg/dL     CBC & Differential [[id number]]  (Abnormal) Collected: 08/09/24 0443    Specimen: Blood Updated: 08/09/24 0536    Narrative:      The following orders were created for panel order CBC & Differential.  Procedure                               Abnormality         Status                     ---------                               -----------         ------                     CBC Auto Differential[[id number]]        Abnormal            Final result                 Please view results for these tests on the individual orders.    CBC Auto Differential [[id number]]  (Abnormal) Collected: 08/09/24 0443    Specimen: Blood Updated: 08/09/24 0536     WBC 9.53 10*3/mm3      RBC 3.46 10*6/mm3      Hemoglobin 10.7 g/dL      Hematocrit 33.7 %      MCV 97.4 fL      MCH 30.9 pg      MCHC 31.8 g/dL      RDW 13.0 %      RDW-SD 46.2 fl      MPV 11.1 fL      Platelets 174 10*3/mm3      Neutrophil % 64.9 %      Lymphocyte % 22.8 %      Monocyte % 9.9 %      Eosinophil % 1.5 %      Basophil % 0.5 %      Immature Grans % 0.4 %      Neutrophils, Absolute 6.19 10*3/mm3      Lymphocytes, Absolute 2.17 10*3/mm3      Monocytes, Absolute 0.94 10*3/mm3      Eosinophils, Absolute 0.14 10*3/mm3      Basophils, Absolute 0.05 10*3/mm3      Immature Grans, Absolute 0.04 10*3/mm3      nRBC 0.0 /100 WBC     POC Glucose Once [[id number]]  (Abnormal) Collected: 08/09/24 0522    Specimen: Blood Updated: 08/09/24 0528     Glucose 177 mg/dL     POC Glucose Once [[id number]]  (Abnormal) Collected: 08/08/24 2344    Specimen: Blood Updated: 08/08/24 2345     Glucose 146 mg/dL     Prealbumin [[id number]]  (Abnormal) Collected: 08/08/24 1231    Specimen: Blood Updated: 08/08/24 1923     Prealbumin 17.7 mg/dL             I/O last shift:  I/O  this shift:  In: 0   Out: 100 [Drains:100]     PHYSICAL EXAM-  Comfortable  cv- rsr  Chest- clear, =  Abd- soft, mild distention    Pathology:  Order Name Source Comment Collection Info Order Time   TYPE AND SCREEN   Collected By: Marian Rapp RN 8/6/2024 11:55 AM     Release to patient   Routine Release        TISSUE PATHOLOGY EXAM Omentum  Collected By: Caleb Hirsch MD 8/6/2024  4:50 PM     Release to patient   Routine Release        .    Assessment and Plan:  Plan to clamp NG, clears  Advance diet thereafter if tolerates/low residuals.    Caleb Hirsch MD  08/09/24  17:03 EDT   No

## 2024-08-10 LAB
BASOPHILS # BLD AUTO: 0.05 10*3/MM3 (ref 0–0.2)
BASOPHILS NFR BLD AUTO: 0.6 % (ref 0–1.5)
DEPRECATED RDW RBC AUTO: 43.3 FL (ref 37–54)
EOSINOPHIL # BLD AUTO: 0.32 10*3/MM3 (ref 0–0.4)
EOSINOPHIL NFR BLD AUTO: 3.7 % (ref 0.3–6.2)
ERYTHROCYTE [DISTWIDTH] IN BLOOD BY AUTOMATED COUNT: 12.4 % (ref 12.3–15.4)
GLUCOSE BLDC GLUCOMTR-MCNC: 107 MG/DL (ref 70–130)
GLUCOSE BLDC GLUCOMTR-MCNC: 149 MG/DL (ref 70–130)
GLUCOSE BLDC GLUCOMTR-MCNC: 157 MG/DL (ref 70–130)
HCT VFR BLD AUTO: 34.1 % (ref 34–46.6)
HGB BLD-MCNC: 11.2 G/DL (ref 12–15.9)
IMM GRANULOCYTES # BLD AUTO: 0.08 10*3/MM3 (ref 0–0.05)
IMM GRANULOCYTES NFR BLD AUTO: 0.9 % (ref 0–0.5)
LYMPHOCYTES # BLD AUTO: 1.99 10*3/MM3 (ref 0.7–3.1)
LYMPHOCYTES NFR BLD AUTO: 23 % (ref 19.6–45.3)
MAGNESIUM SERPL-MCNC: 2.1 MG/DL (ref 1.6–2.4)
MCH RBC QN AUTO: 31.4 PG (ref 26.6–33)
MCHC RBC AUTO-ENTMCNC: 32.8 G/DL (ref 31.5–35.7)
MCV RBC AUTO: 95.5 FL (ref 79–97)
MONOCYTES # BLD AUTO: 0.79 10*3/MM3 (ref 0.1–0.9)
MONOCYTES NFR BLD AUTO: 9.1 % (ref 5–12)
NEUTROPHILS NFR BLD AUTO: 5.42 10*3/MM3 (ref 1.7–7)
NEUTROPHILS NFR BLD AUTO: 62.7 % (ref 42.7–76)
NRBC BLD AUTO-RTO: 0 /100 WBC (ref 0–0.2)
PHOSPHATE SERPL-MCNC: 2.3 MG/DL (ref 2.5–4.5)
PLATELET # BLD AUTO: 176 10*3/MM3 (ref 140–450)
PMV BLD AUTO: 11.3 FL (ref 6–12)
RBC # BLD AUTO: 3.57 10*6/MM3 (ref 3.77–5.28)
WBC NRBC COR # BLD AUTO: 8.65 10*3/MM3 (ref 3.4–10.8)

## 2024-08-10 PROCEDURE — 25010000002 PIPERACILLIN SOD-TAZOBACTAM PER 1 G

## 2024-08-10 PROCEDURE — 25010000002 MAGNESIUM SULFATE PER 500 MG OF MAGNESIUM

## 2024-08-10 PROCEDURE — 25010000002 POTASSIUM CHLORIDE PER 2 MEQ OF POTASSIUM

## 2024-08-10 PROCEDURE — 83735 ASSAY OF MAGNESIUM: CPT

## 2024-08-10 PROCEDURE — 85025 COMPLETE CBC W/AUTO DIFF WBC: CPT | Performed by: COLON & RECTAL SURGERY

## 2024-08-10 PROCEDURE — 25010000002 THIAMINE HCL 200 MG/2ML SOLUTION

## 2024-08-10 PROCEDURE — 25010000002 HEPARIN (PORCINE) PER 1000 UNITS: Performed by: COLON & RECTAL SURGERY

## 2024-08-10 PROCEDURE — 25810000003 SODIUM CHLORIDE 0.9 % SOLUTION: Performed by: INTERNAL MEDICINE

## 2024-08-10 PROCEDURE — 84100 ASSAY OF PHOSPHORUS: CPT | Performed by: INTERNAL MEDICINE

## 2024-08-10 PROCEDURE — 82948 REAGENT STRIP/BLOOD GLUCOSE: CPT

## 2024-08-10 PROCEDURE — 25010000002 CALCIUM GLUCONATE PER 10 ML

## 2024-08-10 RX ORDER — AMLODIPINE BESYLATE 5 MG/1
5 TABLET ORAL
Status: DISCONTINUED | OUTPATIENT
Start: 2024-08-10 | End: 2024-08-12 | Stop reason: HOSPADM

## 2024-08-10 RX ADMIN — SMOFLIPID 100 ML: 6; 6; 5; 3 INJECTION, EMULSION INTRAVENOUS at 22:46

## 2024-08-10 RX ADMIN — ALVIMOPAN 12 MG: 12 CAPSULE ORAL at 08:54

## 2024-08-10 RX ADMIN — POTASSIUM PHOSPHATE, MONOBASIC POTASSIUM PHOSPHATE, DIBASIC: 224; 236 INJECTION, SOLUTION, CONCENTRATE INTRAVENOUS at 17:19

## 2024-08-10 RX ADMIN — HYDROCODONE BITARTRATE AND ACETAMINOPHEN 1 TABLET: 7.5; 325 TABLET ORAL at 13:31

## 2024-08-10 RX ADMIN — LEVOTHYROXINE SODIUM 25 MCG: 25 TABLET ORAL at 04:04

## 2024-08-10 RX ADMIN — ACETAMINOPHEN 650 MG: 325 TABLET ORAL at 04:04

## 2024-08-10 RX ADMIN — DIAZEPAM 5 MG: 5 TABLET ORAL at 17:29

## 2024-08-10 RX ADMIN — CLONIDINE HYDROCHLORIDE 0.1 MG: 0.1 TABLET ORAL at 04:04

## 2024-08-10 RX ADMIN — HEPARIN SODIUM 5000 UNITS: 5000 INJECTION INTRAVENOUS; SUBCUTANEOUS at 04:04

## 2024-08-10 RX ADMIN — AMLODIPINE BESYLATE 5 MG: 5 TABLET ORAL at 17:29

## 2024-08-10 RX ADMIN — LATANOPROST 1 DROP: 50 SOLUTION OPHTHALMIC at 20:28

## 2024-08-10 RX ADMIN — HEPARIN SODIUM 5000 UNITS: 5000 INJECTION INTRAVENOUS; SUBCUTANEOUS at 13:31

## 2024-08-10 RX ADMIN — SMOFLIPID 100 ML: 6; 6; 5; 3 INJECTION, EMULSION INTRAVENOUS at 17:18

## 2024-08-10 RX ADMIN — ALVIMOPAN 12 MG: 12 CAPSULE ORAL at 20:27

## 2024-08-10 RX ADMIN — METOPROLOL SUCCINATE 100 MG: 100 TABLET, EXTENDED RELEASE ORAL at 20:27

## 2024-08-10 RX ADMIN — ACETAMINOPHEN 650 MG: 325 TABLET ORAL at 20:27

## 2024-08-10 RX ADMIN — FOLIC ACID 1 MG: 5 INJECTION, SOLUTION INTRAMUSCULAR; INTRAVENOUS; SUBCUTANEOUS at 08:54

## 2024-08-10 RX ADMIN — DIAZEPAM 5 MG: 5 TABLET ORAL at 08:54

## 2024-08-10 RX ADMIN — PIPERACILLIN SODIUM AND TAZOBACTAM SODIUM 3.38 G: 3; .375 INJECTION, POWDER, LYOPHILIZED, FOR SOLUTION INTRAVENOUS at 13:31

## 2024-08-10 RX ADMIN — PIPERACILLIN SODIUM AND TAZOBACTAM SODIUM 3.38 G: 3; .375 INJECTION, POWDER, LYOPHILIZED, FOR SOLUTION INTRAVENOUS at 20:28

## 2024-08-10 RX ADMIN — THIAMINE HYDROCHLORIDE 100 MG: 100 INJECTION, SOLUTION INTRAMUSCULAR; INTRAVENOUS at 08:54

## 2024-08-10 RX ADMIN — SODIUM CHLORIDE 45 ML/HR: 9 INJECTION, SOLUTION INTRAVENOUS at 04:25

## 2024-08-10 RX ADMIN — ACETAMINOPHEN 650 MG: 325 TABLET ORAL at 13:31

## 2024-08-10 RX ADMIN — HEPARIN SODIUM 5000 UNITS: 5000 INJECTION INTRAVENOUS; SUBCUTANEOUS at 20:27

## 2024-08-10 RX ADMIN — Medication 10 ML: at 20:28

## 2024-08-10 RX ADMIN — PIPERACILLIN SODIUM AND TAZOBACTAM SODIUM 3.38 G: 3; .375 INJECTION, POWDER, LYOPHILIZED, FOR SOLUTION INTRAVENOUS at 04:23

## 2024-08-10 NOTE — PLAN OF CARE
Problem: Adult Inpatient Plan of Care  Goal: Plan of Care Review  Outcome: Ongoing, Progressing  Goal: Patient-Specific Goal (Individualized)  Outcome: Ongoing, Progressing  Goal: Absence of Hospital-Acquired Illness or Injury  Outcome: Ongoing, Progressing  Intervention: Identify and Manage Fall Risk  Recent Flowsheet Documentation  Taken 8/10/2024 1000 by Tiffanie Kumar RN  Safety Promotion/Fall Prevention:   activity supervised   clutter free environment maintained   assistive device/personal items within reach   fall prevention program maintained   nonskid shoes/slippers when out of bed   room organization consistent   safety round/check completed   toileting scheduled  Taken 8/10/2024 0800 by Tiffanie Kumar RN  Safety Promotion/Fall Prevention:   activity supervised   assistive device/personal items within reach   clutter free environment maintained   fall prevention program maintained   nonskid shoes/slippers when out of bed   room organization consistent   safety round/check completed   toileting scheduled  Intervention: Prevent Skin Injury  Recent Flowsheet Documentation  Taken 8/10/2024 1000 by Tiffanie Kumar RN  Body Position:   weight shifting   position changed independently  Taken 8/10/2024 0800 by Tiffanie Kumar RN  Body Position: (up in chair)   weight shifting   other (see comments)  Skin Protection:   incontinence pads utilized   silicone foam dressing in place   skin-to-skin areas padded   transparent dressing maintained   tubing/devices free from skin contact  Intervention: Prevent and Manage VTE (Venous Thromboembolism) Risk  Recent Flowsheet Documentation  Taken 8/10/2024 1000 by Tiffanie Kumar RN  Activity Management: up in chair  Taken 8/10/2024 0800 by Tiffanie Kumar RN  Activity Management:   ambulated to bathroom   ambulated in room   activity encouraged   up in chair  VTE Prevention/Management:   bilateral   sequential compression devices on  Range of Motion: active ROM  (range of motion) encouraged  Goal: Optimal Comfort and Wellbeing  Outcome: Ongoing, Progressing  Intervention: Monitor Pain and Promote Comfort  Recent Flowsheet Documentation  Taken 8/10/2024 1000 by Tiffanie Kumar, RN  Pain Management Interventions:   care clustered   see MAR  Taken 8/10/2024 0800 by Tiffanie Kumar, RN  Pain Management Interventions:   care clustered   see MAR  Intervention: Provide Person-Centered Care  Recent Flowsheet Documentation  Taken 8/10/2024 0800 by Tiffanie Kumar, RN  Trust Relationship/Rapport:   care explained   choices provided  Goal: Readiness for Transition of Care  Outcome: Ongoing, Progressing   Goal Outcome Evaluation:

## 2024-08-10 NOTE — PROGRESS NOTES
Nasogastric tube is out.  She has had some bowel movements and passing gas.  Excited about full liquids today.  Abdominal exam is reassuring as well as her chart data.  I have stopped her main IV fluids.  If she does well with full school go ahead and start weaning her TPN tomorrow.  Daughter at the bedside and all questions answered.

## 2024-08-10 NOTE — PROGRESS NOTES
"IM progress note      Naila Landin  [id number]  1940     LOS: 4 days     Attending: Caleb Hirsch MD    Primary Care Provider: Albert Rincon MD      Chief Complaint/Reason for visit:  Abd pain    Subjective   Doing well overall.  Improved pain control, has passed gas and bowel movement.  NG tube output has been minimal overnight and after being clamped for several hours this morning.  NG tube has been removed.  She has tolerated clear liquids and was advanced to full liquids.    Objective          Visit Vitals  BP (!) 182/73 (BP Location: Left arm, Patient Position: Lying)   Pulse 68   Temp 98.2 °F (36.8 °C) (Oral)   Resp 18   Ht 166.4 cm (65.5\")   Wt 87.8 kg (193 lb 9.6 oz)   SpO2 95%   BMI 31.73 kg/m²     Temp (24hrs), Av.3 °F (36.8 °C), Min:98.1 °F (36.7 °C), Max:98.6 °F (37 °C)      Nutrition: Clears, advised on sips and chips today         Physical Exam:     General Appearance:    Alert, cooperative, in no acute distress   Head:    Normocephalic, without obvious abnormality, atraumatic    Lungs:     Normal effort, symmetric chest rise, no crepitus, clear to      auscultation bilaterally             Heart:    Regular rhythm and normal rate, normal S1 and S2   Abdomen:      Soft,, mild distention clean incisions.  ESTHER with serosanguineous output.    Extremities:   No clubbing, cyanosis or edema.  No deformities.    Pulses:   Pulses palpable and equal bilaterally   Skin:   No bleeding, bruising or rash          Results Review:     I reviewed the patient's new clinical results.   Results from last 7 days   Lab Units 08/10/24  0527 24  0443 24  1231   WBC 10*3/mm3 8.65 9.53 15.89*   HEMOGLOBIN g/dL 11.2* 10.7* 12.6   HEMATOCRIT % 34.1 33.7* 40.0   PLATELETS 10*3/mm3 176 174 222     Results from last 7 days   Lab Units 24  0443 24  1231 24  0327   SODIUM mmol/L 140 142 140   POTASSIUM mmol/L 3.8 4.1 4.5   CHLORIDE mmol/L 108* 103 105   CO2 mmol/L 25.0 28.0 25.0 " Lumbar Spinal Stenosis   AMBULATORY CARE:   Lumbar spinal stenosis  is narrowing of the spinal canal in your lower back  Your spinal canal is the opening in your spine that contains your spinal cord  When your spinal canal narrows, it may put pressure on your spinal cord and nerves  Common signs and symptoms:  Signs and symptoms may start or get worse when you stand or walk  They are often relieved when you sit or lean forward  · Low back pain    · Pain, numbness, tingling, or weakness in one or both legs    · Pain in your buttocks that extends to your thighs or legs  Seek care immediately if:   · You have pain in your leg that does not go away or gets worse  · You have trouble moving your legs  · You cannot control when you urinate or have a bowel movement  Contact your healthcare provider if:   · You have new or worsening symptoms  · Your symptoms keep you from doing your daily activities  · You have questions or concerns about your condition or care  Treatment:   · NSAIDs , such as ibuprofen, help decrease swelling, pain, and fever  NSAIDs can cause stomach bleeding or kidney problems in certain people  If you take blood thinner medicine, always ask your healthcare provider if NSAIDs are safe for you  Always read the medicine label and follow directions  · Acetaminophen  decreases pain and fever  It is available without a doctor's order  Ask how much to take and how often to take it  Follow directions  Read the labels of all other medicines you are using to see if they also contain acetaminophen, or ask your doctor or pharmacist  Acetaminophen can cause liver damage if not taken correctly  Do not use more than 4 grams (4,000 milligrams) total of acetaminophen in one day  · Prescription pain medicine  may be given  Ask how to take this medicine safely  · Muscle relaxers  help decrease pain and muscle spasms  · A steroid injection  may be given to reduce inflammation   Steroid medicine   BUN mg/dL 12 11 13   CREATININE mg/dL 0.58 0.65 0.66   CALCIUM mg/dL 7.8* 8.5* 8.3*   BILIRUBIN mg/dL  --  0.7  0.7  --    ALK PHOS U/L  --  89  89  --    ALT (SGPT) U/L  --  17 17  --    AST (SGOT) U/L  --  19 19  --    GLUCOSE mg/dL 150* 148* 129*     I reviewed the patient's new imaging including images and reports.    All medications reviewed.   acetaminophen, 650 mg, Oral, Q8H  alvimopan, 12 mg, Oral, BID  Fat Emul Fish Oil/Plant Based, 100 mL, Intravenous, Q24H (TPN)   And  Fat Emul Fish Oil/Plant Based, 100 mL, Intravenous, Q24H (TPN)  folic acid 1 mg in sodium chloride 0.9 % 50 mL IVPB, 1 mg, Intravenous, Daily  heparin (porcine), 5,000 Units, Subcutaneous, Q8H  insulin regular, 2-7 Units, Subcutaneous, Q6H  latanoprost, 1 drop, Both Eyes, Nightly  levothyroxine, 25 mcg, Oral, Q AM  metoprolol succinate XL, 100 mg, Oral, Nightly  piperacillin-tazobactam, 3.375 g, Intravenous, Q8H  sodium chloride, 10 mL, Intravenous, Q12H  thiamine (B-1) IV, 100 mg, Intravenous, Daily        Assessment & Plan     S/P right colectomy, laparoscopic with extensive JESSICA    Cecal neoplasm    HTN (hypertension)    Hypothyroidism (acquired)    Elevated hemoglobin A1c  Suspected ileus.  Leukocytosis, resolved    Plan    NG tube removed today 1824    1. Ambulation, encouraged, several times daily  2. Pain control-prns   3. IS-encouraged  4. DVT proph- mechs/heparin  5. Bowel regimen  6.  Advance diet as tolerated, advised to take it slow.  TPN today, possibly can start weaning down tomorrow if able to advance to soft diet and tolerate that.       - Hypertension:  Resume home medications as appropriate, formulary substitution when indicated.  Holding parameters.  Prn medications for elevated blood pressure.     -Hypothyroidism:  Resume replacement.          David Amaya MD  08/10/24  17:01 EDT   is injected into the epidural space  The epidural space is between your spinal cord and vertebrae  · A nerve block  is an injection of numbing medicine  You may need a nerve block if your pain is not going away, or is getting worse  · Surgery  may be needed to widen your spinal canal or decrease pressure on your spinal cord  Surgery may also be done to fix damaged or injured vertebrae in your back  Manage your symptoms:   · Go to physical and occupational therapy  as directed  A physical therapist teaches you exercises to help improve movement and strength, and to decrease pain  An occupational therapist teaches you skills to help with your daily activities  · Rest  when you feel it is needed  Slowly start to do more each day  Return to your daily activities as directed  · Apply heat on your back for 20 to 30 minutes every 2 hours for as many days as directed  Heat helps decrease pain and muscle spasms  · Apply ice  on your back for 15 to 20 minutes every hour or as directed  Use an ice pack, or put crushed ice in a plastic bag  Cover it with a towel before you apply it to your skin  Ice helps prevent tissue damage and decreases swelling and pain  Follow up with your healthcare provider as directed:  Write down your questions so you remember to ask them during your visits  © 2017 [employer]  Information is for End User's use only and may not be sold, redistributed or otherwise used for commercial purposes  All illustrations and images included in CareNotes® are the copyrighted property of [employer]  or Esteban Pfeiffer  The above information is an  only  It is not intended as medical advice for individual conditions or treatments  Talk to your doctor, nurse or pharmacist before following any medical regimen to see if it is safe and effective for you

## 2024-08-10 NOTE — PROGRESS NOTES
Pharmacy Consult - TPN Monitoring  Naila Landin is a 84 y.o. female receiving TPN.     Indication: inaccessible GI Tract  Consulting Physician: Dr. Amaya  Total Fluid Rate (if stated): 120 mL/hr of IV fluids, including peripheral IVF and TPN    Labs  Results from last 7 days   Lab Units 08/09/24  0443 08/08/24  1231 08/08/24  0327   SODIUM mmol/L 140 142 140   POTASSIUM mmol/L 3.8 4.1 4.5   CHLORIDE mmol/L 108* 103 105   CO2 mmol/L 25.0 28.0 25.0   BUN mg/dL 12 11 13   CREATININE mg/dL 0.58 0.65 0.66   CALCIUM mg/dL 7.8* 8.5* 8.3*   BILIRUBIN mg/dL  --  0.7  0.7  --    ALK PHOS U/L  --  89  89  --    ALT (SGPT) U/L  --  17  17  --    AST (SGOT) U/L  --  19  19  --    GLUCOSE mg/dL 150* 148* 129*     Results from last 7 days   Lab Units 08/10/24  0527 08/09/24  1423 08/09/24  0443 08/08/24  1231   MAGNESIUM mg/dL 2.1  --  2.1 2.2   PHOSPHORUS mg/dL 2.3* 1.7* 1.9* 2.1*   PREALBUMIN mg/dL  --   --   --  17.7*     Results from last 7 days   Lab Units 08/10/24  0527 08/09/24  0443 08/08/24  1231   WBC 10*3/mm3 8.65 9.53 15.89*   HEMOGLOBIN g/dL 11.2* 10.7* 12.6   HEMATOCRIT % 34.1 33.7* 40.0   PLATELETS 10*3/mm3 176 174 222     Triglycerides   Date Value Ref Range Status   08/08/2024 224 (H) 0 - 150 mg/dL Final     Comment:     Falsely depressed results may occur on samples drawn from patients receiving N-Acetylcysteine (NAC) or Metamizole.     estimated creatinine clearance is 82.6 mL/min (by C-G formula based on SCr of 0.58 mg/dL).  Intake & Output (last 3 days)         08/05 0701 08/06 0700 08/06 0701 08/07 0700 08/07 0701 08/08 0700 08/08 0701 08/09 0700    I.V. (mL/kg)  2400 (27.4)      IV Piggyback  100      Total Intake(mL/kg)  2500 (28.6)      Urine (mL/kg/hr)  330 650 (0.3)     Emesis/NG output  0 1900     Drains  140 40     Total Output  470 2590     Net  +2030 -2590             Urine Unmeasured Occurrence   1 x     Emesis Unmeasured Occurrence  2 x            Lab Results   Lab Value  Date/Time    HGBA1C 6.20 (H) 07/18/2024 1347     Results from last 7 days   Lab Units 08/10/24  1202 08/10/24  0544 08/09/24  2356 08/09/24 2016 08/09/24  1557 08/09/24  1147 08/09/24  0522 08/08/24  2344   GLUCOSE mg/dL 107 157* 145* 162* 128 144* 177* 146*     Dietitian Recommendations  Diet Orders (active) (From admission, onward)       Start     Ordered    08/08/24 1202  NPO Diet NPO Type: Sips with Meds  Diet Effective Now         08/08/24 1201    08/06/24 2200  Snack: Chewing gum x30 minutes three times daily to increase saliva flow and provide gas pain relief. Do not give to ileostomy patients.  3 Times Daily      Comments: Chewing gum x30 minutes three times daily to increase saliva flow and provide gas pain relief.      Do not give to ileostomy patients.    08/06/24 2035                Current TPN Regimen Recommendation: Dextrose 5.5% / Amino Acid 6.9% at goal rate of 75 mL/hr. SMOF 20% Lipid Emulsion 200 mL IV every 24 hours.    Current TPN Electrolyte Content:  Na 45 mEq/L  K 40.5 mEq/L  Ca 5 mEq/L  Mg 8 mEq/L  PO4 15 mmol/L  Cl:Ace Max chloride    Assessment/Plan:  Patient receiving PN for malnutrition.   Macronutrients per dietary recommendation as listed above.   Continue with standard electrolytes as listed above. Exogenous electrolyte replacement available as needed.   Blood glucose controlled with regular insulin low dose sliding scale available as needed.   Continue normal saline at 45 mL/hr for a total fluid rate of 120 mL/hr per Dr. Amaya.  Pharmacy will continue to monitor patient's labs and clinical status and make adjustments to PN as indicated.     Thank you,    Jamal Vega [facility]  8/10/2024  13:21 EDT

## 2024-08-11 LAB
ALBUMIN SERPL-MCNC: 3.2 G/DL (ref 3.5–5.2)
ALBUMIN/GLOB SERPL: 1.6 G/DL
ALP SERPL-CCNC: 72 U/L (ref 39–117)
ALT SERPL W P-5'-P-CCNC: 12 U/L (ref 1–33)
ANION GAP SERPL CALCULATED.3IONS-SCNC: 10 MMOL/L (ref 5–15)
AST SERPL-CCNC: 17 U/L (ref 1–32)
BILIRUB SERPL-MCNC: 0.4 MG/DL (ref 0–1.2)
BUN SERPL-MCNC: 15 MG/DL (ref 8–23)
BUN/CREAT SERPL: 27.3 (ref 7–25)
CALCIUM SPEC-SCNC: 8 MG/DL (ref 8.6–10.5)
CHLORIDE SERPL-SCNC: 104 MMOL/L (ref 98–107)
CHOLEST SERPL-MCNC: 118 MG/DL (ref 0–200)
CO2 SERPL-SCNC: 21 MMOL/L (ref 22–29)
CREAT SERPL-MCNC: 0.55 MG/DL (ref 0.57–1)
EGFRCR SERPLBLD CKD-EPI 2021: 91.6 ML/MIN/1.73
GLOBULIN UR ELPH-MCNC: 2 GM/DL
GLUCOSE BLDC GLUCOMTR-MCNC: 103 MG/DL (ref 70–130)
GLUCOSE BLDC GLUCOMTR-MCNC: 109 MG/DL (ref 70–130)
GLUCOSE BLDC GLUCOMTR-MCNC: 131 MG/DL (ref 70–130)
GLUCOSE BLDC GLUCOMTR-MCNC: 143 MG/DL (ref 70–130)
GLUCOSE BLDC GLUCOMTR-MCNC: 154 MG/DL (ref 70–130)
GLUCOSE SERPL-MCNC: 163 MG/DL (ref 65–99)
MAGNESIUM SERPL-MCNC: 1.9 MG/DL (ref 1.6–2.4)
PHOSPHATE SERPL-MCNC: 2.6 MG/DL (ref 2.5–4.5)
POTASSIUM SERPL-SCNC: 4.1 MMOL/L (ref 3.5–5.2)
PROT SERPL-MCNC: 5.2 G/DL (ref 6–8.5)
SODIUM SERPL-SCNC: 135 MMOL/L (ref 136–145)
TRIGL SERPL-MCNC: 206 MG/DL (ref 0–150)

## 2024-08-11 PROCEDURE — 82948 REAGENT STRIP/BLOOD GLUCOSE: CPT

## 2024-08-11 PROCEDURE — 25010000002 PIPERACILLIN SOD-TAZOBACTAM PER 1 G

## 2024-08-11 PROCEDURE — 25010000002 HEPARIN (PORCINE) PER 1000 UNITS: Performed by: COLON & RECTAL SURGERY

## 2024-08-11 PROCEDURE — 83735 ASSAY OF MAGNESIUM: CPT

## 2024-08-11 PROCEDURE — 97110 THERAPEUTIC EXERCISES: CPT

## 2024-08-11 PROCEDURE — 80053 COMPREHEN METABOLIC PANEL: CPT

## 2024-08-11 PROCEDURE — 82465 ASSAY BLD/SERUM CHOLESTEROL: CPT

## 2024-08-11 PROCEDURE — 84100 ASSAY OF PHOSPHORUS: CPT

## 2024-08-11 PROCEDURE — 97116 GAIT TRAINING THERAPY: CPT

## 2024-08-11 PROCEDURE — 84478 ASSAY OF TRIGLYCERIDES: CPT

## 2024-08-11 PROCEDURE — 25010000002 THIAMINE HCL 200 MG/2ML SOLUTION

## 2024-08-11 RX ORDER — DOXYCYCLINE 100 MG/1
100 CAPSULE ORAL EVERY 12 HOURS SCHEDULED
Status: DISCONTINUED | OUTPATIENT
Start: 2024-08-11 | End: 2024-08-12 | Stop reason: HOSPADM

## 2024-08-11 RX ORDER — AMOXICILLIN AND CLAVULANATE POTASSIUM 875; 125 MG/1; MG/1
1 TABLET, FILM COATED ORAL EVERY 12 HOURS SCHEDULED
Status: DISCONTINUED | OUTPATIENT
Start: 2024-08-11 | End: 2024-08-12 | Stop reason: HOSPADM

## 2024-08-11 RX ADMIN — FOLIC ACID 1 MG: 5 INJECTION, SOLUTION INTRAMUSCULAR; INTRAVENOUS; SUBCUTANEOUS at 07:55

## 2024-08-11 RX ADMIN — DOXYCYCLINE 100 MG: 100 CAPSULE ORAL at 21:10

## 2024-08-11 RX ADMIN — HYDROCODONE BITARTRATE AND ACETAMINOPHEN 1 TABLET: 7.5; 325 TABLET ORAL at 21:19

## 2024-08-11 RX ADMIN — LEVOTHYROXINE SODIUM 25 MCG: 25 TABLET ORAL at 04:51

## 2024-08-11 RX ADMIN — THIAMINE HYDROCHLORIDE 100 MG: 100 INJECTION, SOLUTION INTRAMUSCULAR; INTRAVENOUS at 07:56

## 2024-08-11 RX ADMIN — PIPERACILLIN SODIUM AND TAZOBACTAM SODIUM 3.38 G: 3; .375 INJECTION, POWDER, LYOPHILIZED, FOR SOLUTION INTRAVENOUS at 04:51

## 2024-08-11 RX ADMIN — ACETAMINOPHEN 650 MG: 325 TABLET ORAL at 12:31

## 2024-08-11 RX ADMIN — HYDROCODONE BITARTRATE AND ACETAMINOPHEN 1 TABLET: 7.5; 325 TABLET ORAL at 12:31

## 2024-08-11 RX ADMIN — AMOXICILLIN AND CLAVULANATE POTASSIUM 1 TABLET: 875; 125 TABLET, FILM COATED ORAL at 12:31

## 2024-08-11 RX ADMIN — ACETAMINOPHEN 650 MG: 325 TABLET ORAL at 21:10

## 2024-08-11 RX ADMIN — HEPARIN SODIUM 5000 UNITS: 5000 INJECTION INTRAVENOUS; SUBCUTANEOUS at 04:52

## 2024-08-11 RX ADMIN — AMLODIPINE BESYLATE 5 MG: 5 TABLET ORAL at 07:55

## 2024-08-11 RX ADMIN — METOPROLOL SUCCINATE 100 MG: 100 TABLET, EXTENDED RELEASE ORAL at 21:10

## 2024-08-11 RX ADMIN — LATANOPROST 1 DROP: 50 SOLUTION OPHTHALMIC at 21:12

## 2024-08-11 RX ADMIN — CLONIDINE HYDROCHLORIDE 0.1 MG: 0.1 TABLET ORAL at 04:52

## 2024-08-11 RX ADMIN — DIAZEPAM 5 MG: 5 TABLET ORAL at 07:56

## 2024-08-11 RX ADMIN — ACETAMINOPHEN 650 MG: 325 TABLET ORAL at 04:51

## 2024-08-11 RX ADMIN — Medication 10 ML: at 21:12

## 2024-08-11 RX ADMIN — HEPARIN SODIUM 5000 UNITS: 5000 INJECTION INTRAVENOUS; SUBCUTANEOUS at 21:11

## 2024-08-11 RX ADMIN — ALVIMOPAN 12 MG: 12 CAPSULE ORAL at 07:55

## 2024-08-11 RX ADMIN — AMOXICILLIN AND CLAVULANATE POTASSIUM 1 TABLET: 875; 125 TABLET, FILM COATED ORAL at 21:11

## 2024-08-11 RX ADMIN — DOXYCYCLINE 100 MG: 100 CAPSULE ORAL at 12:31

## 2024-08-11 NOTE — PLAN OF CARE
Goal Outcome Evaluation:     Problem: Adult Inpatient Plan of Care  Goal: Plan of Care Review  Outcome: Ongoing, Progressing  Goal: Patient-Specific Goal (Individualized)  Outcome: Ongoing, Progressing  Goal: Absence of Hospital-Acquired Illness or Injury  Outcome: Ongoing, Progressing  Goal: Optimal Comfort and Wellbeing  Outcome: Ongoing, Progressing  Goal: Readiness for Transition of Care  Outcome: Ongoing, Progressing     Problem: Skin Injury Risk Increased  Goal: Skin Health and Integrity  Outcome: Ongoing, Progressing     Problem: Fall Injury Risk  Goal: Absence of Fall and Fall-Related Injury  Outcome: Ongoing, Progressing

## 2024-08-11 NOTE — PLAN OF CARE
Goal Outcome Evaluation:  Plan of Care Reviewed With: patient        Progress: improving  Outcome Evaluation: Physical therapy treatment complete. Patient improved tolerance to mobility however continues to demonstrate slowed marti. Patient increased ambulation distance compared to previous treatment session. Pt demo step through gait pattern with slowed marti, short step length, and forward flexed posture. Pt requires increased cues to improve upright posture, forward gaze, and step length.Patient declined chair due to fatigue and reviewed exercises in bed.The patient continues to present below baseline for functional mobility. The patient would continue to benefit from skilled PT to address strength, balance and activity tolerance deficits. Continue to current PT POC      Anticipated Discharge Disposition (PT): home with assist, home with home health

## 2024-08-11 NOTE — PROGRESS NOTES
Doing well.  Tolerating full liquids.  Started on GI soft today.  Afebrile and vital signs are stable.  Abdominal exam looks good.  A little bit of redness around her incision but I think is likely just reactive to the skin glue.  No evidence of cellulitis or wound infection.  Drain is serosanguineous.  She is having bowel movements and passing gas and ambulating.  TPN is weaning.  Likely home tomorrow.  ESTHER drain out today.

## 2024-08-11 NOTE — CONSULTS
Clinical Nutrition     Patient Name: Naila Landin  YOB: 1940  MRN: [id number]  Date of Encounter: 08/11/24 01:34 EDT  Admission date: 8/6/2024  Reason for Visit: Follow-up protocol, PN    Assessment   Nutrition Assessment   Admission Diagnosis:  Cecal neoplasm [D49.0]    Problem List:    S/P right colectomy, laparoscopic with extensive JESSICA    Cecal neoplasm    HTN (hypertension)    Hypothyroidism (acquired)    Elevated hemoglobin A1c      PMH:   She  has a past medical history of Cecal neoplasm, Disease of thyroid gland, Diverticulitis, Hypertension, and Urinary frequency.    PSH:  She  has a past surgical history that includes Colon surgery (2012); Other surgical history; Knee Arthroplasty (Right, 2024); Colonoscopy; Hysterectomy; Cholecystectomy; Appendectomy; Abdominal hernia repair; Tonsillectomy; and Colectomy (Right, 8/6/2024).    Applicable Nutrition History:   (8/6) s/p lap JESSICA, R colectomy  (8/8) PICC placement pending  PN Initiated    Labs    Labs Reviewed: Yes    Results from last 7 days   Lab Units 08/10/24  0527 08/09/24  1423 08/09/24  0443 08/08/24  1231 08/08/24  1231 08/08/24  0327   GLUCOSE mg/dL  --   --  150*  --  148* 129*   BUN mg/dL  --   --  12  --  11 13   CREATININE mg/dL  --   --  0.58  --  0.65 0.66   SODIUM mmol/L  --   --  140  --  142 140   CHLORIDE mmol/L  --   --  108*  --  103 105   POTASSIUM mmol/L  --   --  3.8  --  4.1 4.5   PHOSPHORUS mg/dL 2.3* 1.7* 1.9*   < > 2.1*  --    MAGNESIUM mg/dL 2.1  --  2.1  --  2.2  --    ALT (SGPT) U/L  --   --   --   --  17  17  --     < > = values in this interval not displayed.       Results from last 7 days   Lab Units 08/08/24  1231   ALBUMIN g/dL 3.9  3.9   PREALBUMIN mg/dL 17.7*   CRP mg/dL 1.94*   IONIZED CALCIUM mmol/L 1.19   CHOLESTEROL mg/dL 132   TRIGLYCERIDES mg/dL 224*       Results from last 7 days   Lab Units 08/11/24  0000 08/10/24  1647 08/10/24  1202 08/10/24  0544 08/09/24  7816  08/09/24 2016   GLUCOSE mg/dL 143* 149* 107 157* 145* 162*     Lab Results   Lab Value Date/Time    HGBA1C 6.20 (H) 07/18/2024 1347               Medications    Medications Reviewed: yes    Scheduled Meds:acetaminophen, 650 mg, Oral, Q8H  alvimopan, 12 mg, Oral, BID  amLODIPine, 5 mg, Oral, Q24H  Fat Emul Fish Oil/Plant Based, 100 mL, Intravenous, Q24H (TPN)   And  Fat Emul Fish Oil/Plant Based, 100 mL, Intravenous, Q24H (TPN)  folic acid 1 mg in sodium chloride 0.9 % 50 mL IVPB, 1 mg, Intravenous, Daily  heparin (porcine), 5,000 Units, Subcutaneous, Q8H  insulin regular, 2-7 Units, Subcutaneous, Q6H  latanoprost, 1 drop, Both Eyes, Nightly  levothyroxine, 25 mcg, Oral, Q AM  metoprolol succinate XL, 100 mg, Oral, Nightly  piperacillin-tazobactam, 3.375 g, Intravenous, Q8H  sodium chloride, 10 mL, Intravenous, Q12H  thiamine (B-1) IV, 100 mg, Intravenous, Daily      Continuous Infusions:Adult Central 2-in-1 TPN, , Last Rate: 75 mL/hr at 08/10/24 1719  Pharmacy to Dose TPN,       PRN Meds:.  Calcium Replacement - Follow Nurse / BPA Driven Protocol    cloNIDine    dextrose    dextrose    diazePAM    glucagon (human recombinant)    HYDROcodone-acetaminophen    HYDROmorphone **AND** naloxone    ibuprofen    Magnesium Standard Dose Replacement - Follow Nurse / BPA Driven Protocol    nitroglycerin    ondansetron    Pharmacy to Dose TPN    phenol    Phosphorus Replacement - Follow Nurse / BPA Driven Protocol    Potassium Replacement - Follow Nurse / BPA Driven Protocol    sodium chloride    Intake/Ouptut 24 hrs (0701 - 0700)   I&O's Reviewed: yes  Intake & Output (last day)         08/10 0701 08/11 0700    P.O. 480    I.V. (mL/kg) 1259.3 (14.3)    IV Piggyback 850    TPN 2982.5    Total Intake(mL/kg) 5571.8 (63.5)    Urine (mL/kg/hr) 425 (0.2)    Drains 100    Stool 0    Total Output 525    Net +5046.8         Urine Unmeasured Occurrence 5 x    Stool Unmeasured Occurrence 2 x          Anthropometrics     Height: Height:  "166.4 cm (65.5\")  Last Filed Weight: Weight: 87.8 kg (193 lb 9.6 oz) (08/09/24 0535)  Method: Weight Method: Standing scale  BMI: BMI (Calculated): 31.7    UBW: 200#  Weight change: weight loss of 7 lbs (3.5%) over 2 week(s)    Significant?  No    Nutrition Focused Physical Exam     Date: 8/8    Pt does not meet criteria for malnutrition diagnosis, at this time.      Subjective   Reported/Observed/Food/Nutrition Related History:     8/10  PN at goal no labs for evaluation Pt obs walking - settled in bed comfortably.     8/9  PN initiated yesterday evening. Noted to be hypophosphatemic on AM draw. NGT remains to LWS - 1.3L out. C/o sore throat but using chloraseptic spray q 2hrs. Asked about when NG would be removed, educated on usual timeline, verbalized understanding. Chewing gum. LBM 8/6 2/2 bowel prep - No BM or flatus at this time.     8/8  Consult received for PN. Pt is POD#2 s/p R colectomy with JESSICA, NG in place to LWS with 1.9L output documented. Concern for ileus per CRS. Pt reports drinking Impact AR x8 prior to surgery. Reports last meal on Sunday (~4 days ago). Discussed plan for initiating TPN, pt and daughter verbalized understanding. NKFA    Needs Assessment   Date: 8/8    Height used:Height: 166.4 cm (65.5\")  Weights used: 87.5kg CBW    Estimated Calorie needs: ~ 1625Kcal/day  Method: 17-20 Kcals/KG = 3158-7310  Method:  MSJ (1347) x 1.2 = 1616    Estimated Protein needs: ~ 125 g PRO/day  Method: 1.3-1.5g/Kg = 114-131    Estimated Fluid needs: ~ ml/day   Per clinical status    Current Nutrition Prescription     PO: Adult Central 2-in-1 TPN  Diet: Liquid; Full Liquid; Fluid Consistency: Thin (IDDSI 0)  Oral Nutrition Supplement: N/A  Intake: 1 Day: 88% x 2 liquid meals    PN Route: PICC  PN: 5.5% dextrose, 6.9% AA @ 75mL/hr               GIR:  0.79mg/kg/min   Lipid: 200mL 20% SMOF daily     PN@ Goal over 24hr to Supply:   Volume 1800 mL/day     Energy 1237 Kcal/day 76 % Est Need   Protein 125 g/day 100 " % Est Need     ---------------------------------------------------------------------------  Formula/Rate verified at bedside: Yes  Infusing Rate at time of visit: 75mL/hr    Average Delivery from Charting:  apparent documentation of delivery from inception of PN 2,482.5 ml - assume 3 days    Avg 3 day:  PN Volume 827.5 mL/day     Lipid delivered  200 ml     Energy 783 Kcal/day 48 % Est Need   Protein 57 g/day 46 % Est Need     Assessment & Plan   Nutrition Diagnosis   Date: 8/8 Updated: 8/9, 8/10  Problem Inadequate oral intake    Etiology Altered GI fxn   Signs/Symptoms NPO >48hrs, consult for PN    Status:  Improved   PO diet started and PN now in place at goal to be adjusted pending PO progression    Goal:   Nutrition to support treatment, Advance diet as medically feasible/appropriate, and Maintain PN      Nutrition Intervention      Follow treatment progress, Care plan reviewed    PN regimen in place  5.5% dextrose, 6.9% AA @ goal rate of 75mL/hr. Provide 200mL 20% SMOF daily.   Infused at goal over 24hrs this regimen provides:  1.8L TGV; GIR = 0.79mg/kg/min  1237kcal (76% est needs); 125g protein (100% est needs)    As taking po and plan for possible advance to solid food with wean of PN   For 8/11 keep current PN     If unable to advance diet on 8/11 will increase dextrose for following day.       Advance diet as clinically indicated/tolerated  Provide Impact AR 2x daily once PO diet appropriate    Monitoring/Evaluation:   Per protocol, I&O, Pertinent labs, PN delivery/tolerance, Weight, GI status, Symptoms    Mihaela Mehta RD  Time Spent: 30min

## 2024-08-11 NOTE — PROGRESS NOTES
Pharmacy Consult - TPN Monitoring  Naila Landin is a 84 y.o. female receiving TPN.     Indication: inaccessible GI Tract  Consulting Physician: Dr. Amaya  Total Fluid Rate (if stated): --  Labs  Results from last 7 days   Lab Units 08/11/24  0541 08/09/24  0443 08/08/24  1231   SODIUM mmol/L 135* 140 142   POTASSIUM mmol/L 4.1 3.8 4.1   CHLORIDE mmol/L 104 108* 103   CO2 mmol/L 21.0* 25.0 28.0   BUN mg/dL 15 12 11   CREATININE mg/dL 0.55* 0.58 0.65   CALCIUM mg/dL 8.0* 7.8* 8.5*   BILIRUBIN mg/dL 0.4  --  0.7  0.7   ALK PHOS U/L 72  --  89  89   ALT (SGPT) U/L 12  --  17  17   AST (SGOT) U/L 17  --  19  19   GLUCOSE mg/dL 163* 150* 148*     Results from last 7 days   Lab Units 08/11/24  0541 08/10/24  0527 08/09/24  1423 08/09/24 0443 08/08/24  1231   MAGNESIUM mg/dL 1.9 2.1  --  2.1 2.2   PHOSPHORUS mg/dL 2.6 2.3* 1.7* 1.9* 2.1*   PREALBUMIN mg/dL  --   --   --   --  17.7*     Results from last 7 days   Lab Units 08/10/24  0527 08/09/24 0443 08/08/24  1231   WBC 10*3/mm3 8.65 9.53 15.89*   HEMOGLOBIN g/dL 11.2* 10.7* 12.6   HEMATOCRIT % 34.1 33.7* 40.0   PLATELETS 10*3/mm3 176 174 222     Triglycerides   Date Value Ref Range Status   08/11/2024 206 (H) 0 - 150 mg/dL Final     estimated creatinine clearance is 87.8 mL/min (A) (by C-G formula based on SCr of 0.55 mg/dL (L)).  Intake & Output (last 3 days)         08/05 0701 08/06 0700 08/06 0701  08/07 0700 08/07 0701  08/08 0700 08/08 0701  08/09 0700    I.V. (mL/kg)  2400 (27.4)      IV Piggyback  100      Total Intake(mL/kg)  2500 (28.6)      Urine (mL/kg/hr)  330 650 (0.3)     Emesis/NG output  0 1900     Drains  140 40     Total Output  470 2590     Net  +2030 -2590             Urine Unmeasured Occurrence   1 x     Emesis Unmeasured Occurrence  2 x            Lab Results   Lab Value Date/Time    HGBA1C 6.20 (H) 07/18/2024 1347     Results from last 7 days   Lab Units 08/11/24  1146 08/11/24  0558 08/11/24  0000 08/10/24  1647 08/10/24  1202  08/10/24  0544 24  2356 24   GLUCOSE mg/dL 109 154* 143* 149* 107 157* 145* 162*     Dietitian Recommendations  Diet Orders (active) (From admission, onward)       Start     Ordered    24 1202  NPO Diet NPO Type: Sips with Meds  Diet Effective Now         24 1201    24 2200  Snack: Chewing gum x30 minutes three times daily to increase saliva flow and provide gas pain relief. Do not give to ileostomy patients.  3 Times Daily      Comments: Chewing gum x30 minutes three times daily to increase saliva flow and provide gas pain relief.      Do not give to ileostomy patients.    24                Current TPN Regimen Recommendation: Dextrose 5.5% / Amino Acid 6.9% at goal rate of 75 mL/hr. SMOF 20% Lipid Emulsion 200 mL IV every 24 hours.    Current TPN Electrolyte Content:  Na 45 mEq/L  K 40.5 mEq/L  Ca 5 mEq/L  Mg 8 mEq/L  PO4 15 mmol/L  Cl:Ace Max chloride    Assessment/Plan:  Patient receiving PN for malnutrition.   Macronutrients per dietary recommendation as listed above.   Continue with standard electrolytes as listed above. Exogenous electrolyte replacement available as needed.   Blood glucose controlled with regular insulin low dose sliding scale available as needed.   PN weaning. Rate decreased to 37.5 mL/hr and will let  today. SMOF 20% lipid emulsion discontinued.  Pharmacy will continue to monitor patient's labs and clinical status and make adjustments to PN as indicated.     Thank you,    Jamal Vega [facility]  2024  12:34 EDT

## 2024-08-11 NOTE — PROGRESS NOTES
Doing great    No problems    Pathology discussed, precancerous, large tumor, 4 cm, no invasive cancer    Will stop TPN    Discharge instructions reviewed with patient and her daughter.    Discharge anticipated tomorrow, please let me know if questions arise.    Office follow-up in about 2 weeks.

## 2024-08-11 NOTE — PROGRESS NOTES
"IM progress note      Naila Landin  [id number]  1940     LOS: 5 days     Attending: Caleb Hirsch MD    Primary Care Provider: Albert Rincon MD      Chief Complaint/Reason for visit:  Abd pain    Subjective   Tolerated full liquids. Passing flatus, had BMs. No n/vom/sob.  Ambulating.     Objective          Visit Vitals  /76 (BP Location: Left arm, Patient Position: Lying)   Pulse 75   Temp 97.6 °F (36.4 °C) (Axillary)   Resp 16   Ht 166.4 cm (65.5\")   Wt 89 kg (196 lb 3.2 oz)   SpO2 93%   BMI 32.15 kg/m²     Temp (24hrs), Av.9 °F (36.6 °C), Min:97 °F (36.1 °C), Max:98.7 °F (37.1 °C)      Nutrition: Clears, advised on sips and chips today         Physical Exam:     General Appearance:    Alert, cooperative, in no acute distress   Head:    Normocephalic, without obvious abnormality, atraumatic    Lungs:     Normal effort, symmetric chest rise, no crepitus, clear to      auscultation bilaterally             Heart:    Regular rhythm and normal rate, normal S1 and S2   Abdomen:      Soft,, mild distention clean incisions, mild erythema .  ESTHER with serosanguineous output.    Extremities:   No clubbing, cyanosis or edema.  No deformities.    Pulses:   Pulses palpable and equal bilaterally   Skin:   No bleeding, bruising or rash          Results Review:     I reviewed the patient's new clinical results.   Results from last 7 days   Lab Units 08/10/24  0527 24  0443 24  1231   WBC 10*3/mm3 8.65 9.53 15.89*   HEMOGLOBIN g/dL 11.2* 10.7* 12.6   HEMATOCRIT % 34.1 33.7* 40.0   PLATELETS 10*3/mm3 176 174 222     Results from last 7 days   Lab Units 24  0541 24  0443 24  1231   SODIUM mmol/L 135* 140 142   POTASSIUM mmol/L 4.1 3.8 4.1   CHLORIDE mmol/L 104 108* 103   CO2 mmol/L 21.0* 25.0 28.0   BUN mg/dL 15 12 11   CREATININE mg/dL 0.55* 0.58 0.65   CALCIUM mg/dL 8.0* 7.8* 8.5*   BILIRUBIN mg/dL 0.4  --  0.7  0.7   ALK PHOS U/L 72  --  89  89   ALT (SGPT) U/L 12  --  17 "  17   AST (SGOT) U/L 17  --  19  19   GLUCOSE mg/dL 163* 150* 148*     I reviewed the patient's new imaging including images and reports.    All medications reviewed.   acetaminophen, 650 mg, Oral, Q8H  alvimopan, 12 mg, Oral, BID  amLODIPine, 5 mg, Oral, Q24H  folic acid 1 mg in sodium chloride 0.9 % 50 mL IVPB, 1 mg, Intravenous, Daily  heparin (porcine), 5,000 Units, Subcutaneous, Q8H  insulin regular, 2-7 Units, Subcutaneous, Q6H  latanoprost, 1 drop, Both Eyes, Nightly  levothyroxine, 25 mcg, Oral, Q AM  metoprolol succinate XL, 100 mg, Oral, Nightly  piperacillin-tazobactam, 3.375 g, Intravenous, Q8H  sodium chloride, 10 mL, Intravenous, Q12H  thiamine (B-1) IV, 100 mg, Intravenous, Daily        Assessment & Plan     S/P right colectomy, laparoscopic with extensive JESSICA    Cecal neoplasm    HTN (hypertension)    Hypothyroidism (acquired)    Elevated hemoglobin A1c  Suspected ileus.  Leukocytosis, resolved    Plan    NG tube removed today 1824    1. Ambulation, encouraged, several times daily  2. Pain control-prns   3. IS-encouraged  4. DVT proph- mechs/heparin  5. Bowel regimen  6.  Advance diet as tolerated, to soft low fiber diet today.        - Hypertension:  Resume home medications as appropriate, formulary substitution when indicated.  Holding parameters.  Prn medications for elevated blood pressure.     -Hypothyroidism:  Resume replacement.    Change abx to po. ? Mild early cellulitis.  Start TPN wean.    David Amaya MD  08/11/24  09:34 EDT

## 2024-08-11 NOTE — THERAPY TREATMENT NOTE
Patient Name: Naila Landin  : 1940    MRN: [id number]                              Today's Date: 2024       Admit Date: 2024    Visit Dx:     ICD-10-CM ICD-9-CM   1. Elevated hemoglobin A1c  R73.09 790.29   2. Cecal neoplasm  D49.0 239.0   3. S/P right colectomy, laparoscopic with extensive JESSICA  Z90.49 V45.89   4. Hypothyroidism (acquired)  E03.9 244.9   5. Hypertension, unspecified type  I10 401.9     Patient Active Problem List   Diagnosis    Cecal neoplasm    HTN (hypertension)    Hypothyroidism (acquired)    S/P right colectomy, laparoscopic with extensive JESSICA    Elevated hemoglobin A1c     Past Medical History:   Diagnosis Date    Cecal neoplasm     Disease of thyroid gland     Diverticulitis     Hypertension     Urinary frequency      Past Surgical History:   Procedure Laterality Date    ABDOMINAL HERNIA REPAIR      with mesh    APPENDECTOMY      CHOLECYSTECTOMY      COLON RESECTION Right 2024    Procedure: OPEN RIGHT COLECTOMY, LAPAROSCOPIC LYSIS OF ADHESIONS;  Surgeon: Caleb Hirsch MD;  Location: [facility];  Service: General;  Laterality: Right;    COLON SURGERY      resection due to diverticulitis    COLONOSCOPY      serveral    HYSTERECTOMY      KNEE ARTHROPLASTY Right     OTHER SURGICAL HISTORY      bladder stimulator    TONSILLECTOMY        General Information       Row Name 24 0934          Physical Therapy Time and Intention    Document Type therapy note (daily note)  -KW     Mode of Treatment physical therapy  -KW       Row Name 24 0934          General Information    Patient Profile Reviewed yes  -KW     Existing Precautions/Restrictions fall;other (see comments)  NG to suction; abdominal incision; ESTHER drain  -KW               User Key  (r) = Recorded By, (t) = Taken By, (c) = Cosigned By      Initials Name Provider Type    KW Deepthi Vicente PT Physical Therapist                   Mobility       Row Name 24 0934          Bed Mobility     Bed Mobility supine-sit;sit-supine  -KW     Supine-Sit [place] (Bed Mobility) supervision  -KW     Sit-Supine [place] (Bed Mobility) supervision  -KW       Row Name 08/11/24 0934          Sit-Stand Transfer    Sit-Stand [place] (Transfers) supervision  -KW     Assistive Device (Sit-Stand Transfers) walker, front-wheeled  -KW       Row Name 08/11/24 0934          Gait/Stairs (Locomotion)    [place] Level (Gait) supervision  -KW     Assistive Device (Gait) walker, front-wheeled  -KW     Distance in Feet (Gait) 425  -KW     Deviations/Abnormal Patterns (Gait) bilateral deviations;marti decreased;gait speed decreased;stride length decreased  -KW     Bilateral Gait Deviations forward flexed posture;heel strike decreased  -KW               User Key  (r) = Recorded By, (t) = Taken By, (c) = Cosigned By      Initials Name Provider Type    Deepthi Ma, PT Physical Therapist                   Obj/Interventions    No documentation.                  Goals/Plan    No documentation.                  Clinical Impression       Row Name 08/11/24 0934          Pain    Pretreatment Pain Rating 0/10 - no pain  -KW       Row Name 08/11/24 0934          Plan of Care Review    Plan of Care Reviewed With patient  -KW     Progress improving  -     Outcome Evaluation Physical therapy treatment complete. Patient improved tolerance to mobility however continues to demonstrate slowed marti. Patient increased ambulation distance compared to previous treatment session. Pt demo step through gait pattern with slowed marti, short step length, and forward flexed posture. Pt requires increased cues to improve upright posture, forward gaze, and step length.Patient declined chair due to fatigue and reviewed exercises in bed.The patient continues to present below baseline for functional mobility. The patient would continue to benefit from skilled PT to address strength, balance and activity tolerance deficits.  Continue to current PT POC  -KW       Row Name 08/11/24 0934          Therapy Assessment/Plan (PT)    Patient/Family Therapy Goals Statement (PT) to return to baseline  -KW     Predicted Duration of Therapy Intervention (PT) 10 days  -KW       Row Name 08/11/24 0934          Vital Signs    Pre Systolic BP Rehab 160  -KW     Pre Treatment Diastolic BP 76  -KW       Row Name 08/11/24 0934          Positioning and Restraints    Pre-Treatment Position in bed  -KW     Post Treatment Position bed  -KW     In Bed supine;call light within reach;encouraged to call for assist;exit alarm on  -KW               User Key  (r) = Recorded By, (t) = Taken By, (c) = Cosigned By      Initials Name Provider Type    Deepthi Ma PT Physical Therapist                   Outcome Measures       Row Name 08/11/24 0934 08/11/24 0800       How much help from another person do you currently need...    Turning from your back to your side while in flat bed without using bedrails? 4  -KW 4  -AH    Moving from lying on back to sitting on the side of a flat bed without bedrails? 4  -KW 4  -AH    Moving to and from a bed to a chair (including a wheelchair)? 4  -KW 4  -AH    Standing up from a chair using your arms (e.g., wheelchair, bedside chair)? 3  -KW 4  -AH    Climbing 3-5 steps with a railing? 3  -KW 3  -AH    To walk in hospital room? 3  -KW 4  -AH    AM-PAC 6 Clicks Score (PT) 21  -KW 23  -AH    Highest Level of Mobility Goal 6 --> Walk 10 steps or more  -KW 7 --> Walk 25 feet or more  -AH      Row Name 08/11/24 0934          Functional Assessment    Outcome Measure Options AM-PAC 6 Clicks Basic Mobility (PT)  -KW               User Key  (r) = Recorded By, (t) = Taken By, (c) = Cosigned By      Initials Name Provider Type    Tiffanie Kelly RN Registered Nurse    Deepthi Ma PT Physical Therapist                                 Physical Therapy Education       Title: PT OT SLP Therapies (In Progress)       Topic:  Physical Therapy (In Progress)       Point: Mobility training (Done)       Learning Progress Summary             Patient Acceptance, E, VU by AE at 8/7/2024 1313                         Point: Home exercise program (Not Started)       Learner Progress:  Not documented in this visit.              Point: Body mechanics (Done)       Learning Progress Summary             Patient Acceptance, E, VU by AE at 8/7/2024 1313                         Point: Precautions (Done)       Learning Progress Summary             Patient Acceptance, E, VU by AE at 8/7/2024 1313                                         User Key       Initials Effective Dates Name Provider Type Discipline    AE 09/21/21 -  Paulo Jeffery, PT Physical Therapist PT                  PT Recommendation and Plan     Plan of Care Reviewed With: patient  Progress: improving  Outcome Evaluation: Physical therapy treatment complete. Patient improved tolerance to mobility however continues to demonstrate slowed marti. Patient increased ambulation distance compared to previous treatment session. Pt demo step through gait pattern with slowed marti, short step length, and forward flexed posture. Pt requires increased cues to improve upright posture, forward gaze, and step length.Patient declined chair due to fatigue and reviewed exercises in bed.The patient continues to present below baseline for functional mobility. The patient would continue to benefit from skilled PT to address strength, balance and activity tolerance deficits. Continue to current PT POC     Time Calculation:         PT Charges       Row Name 08/11/24 0934             Time Calculation    Start Time 0934  -KW      PT Received On 08/11/24  -KW         Timed Charges    98550 - PT Therapeutic Exercise Minutes 12  -KW      66771 - Gait Training Minutes  17  -KW         Total Minutes    Timed Charges Total Minutes 29  -KW       Total Minutes 29  -KW                User Key  (r) = Recorded By, (t) =  Taken By, (c) = Cosigned By      Initials Name Provider Type    Deepthi Ma, NATALIA Physical Therapist                  Therapy Charges for Today       Code Description Service Date Service Provider Modifiers Qty    97757042616 HC PT THER PROC EA 15 MIN 8/11/2024 Deepthi Vicente, PT GP 1    07840920496 HC GAIT TRAINING EA 15 MIN 8/11/2024 Deepthi Vicente PT GP 1            PT G-Codes  Outcome Measure Options: AM-PAC 6 Clicks Basic Mobility (PT)  AM-PAC 6 Clicks Score (PT): 21  PT Discharge Summary  Anticipated Discharge Disposition (PT): home with assist, home with home health    Deepthi Vicente PT  8/11/2024

## 2024-08-12 VITALS
DIASTOLIC BLOOD PRESSURE: 71 MMHG | HEART RATE: 94 BPM | BODY MASS INDEX: 32.06 KG/M2 | HEIGHT: 66 IN | WEIGHT: 199.5 LBS | SYSTOLIC BLOOD PRESSURE: 155 MMHG | TEMPERATURE: 98.1 F | OXYGEN SATURATION: 93 % | RESPIRATION RATE: 16 BRPM

## 2024-08-12 LAB
ALBUMIN SERPL-MCNC: 3.1 G/DL (ref 3.5–5.2)
ALBUMIN/GLOB SERPL: 1 G/DL
ALP SERPL-CCNC: 76 U/L (ref 39–117)
ALT SERPL W P-5'-P-CCNC: 14 U/L (ref 1–33)
ANION GAP SERPL CALCULATED.3IONS-SCNC: 7 MMOL/L (ref 5–15)
AST SERPL-CCNC: 18 U/L (ref 1–32)
BILIRUB CONJ SERPL-MCNC: <0.2 MG/DL (ref 0–0.3)
BILIRUB SERPL-MCNC: 0.4 MG/DL (ref 0–1.2)
BUN SERPL-MCNC: 19 MG/DL (ref 8–23)
BUN/CREAT SERPL: 34.5 (ref 7–25)
CA-I SERPL ISE-MCNC: 1.29 MMOL/L (ref 1.12–1.32)
CALCIUM SPEC-SCNC: 8.9 MG/DL (ref 8.6–10.5)
CHLORIDE SERPL-SCNC: 108 MMOL/L (ref 98–107)
CO2 SERPL-SCNC: 23 MMOL/L (ref 22–29)
CREAT SERPL-MCNC: 0.55 MG/DL (ref 0.57–1)
CRP SERPL-MCNC: 2.83 MG/DL (ref 0–0.5)
EGFRCR SERPLBLD CKD-EPI 2021: 91.6 ML/MIN/1.73
GLOBULIN UR ELPH-MCNC: 3 GM/DL
GLUCOSE BLDC GLUCOMTR-MCNC: 128 MG/DL (ref 70–130)
GLUCOSE SERPL-MCNC: 135 MG/DL (ref 65–99)
INR PPP: 1.09 (ref 0.89–1.12)
MAGNESIUM SERPL-MCNC: 2.1 MG/DL (ref 1.6–2.4)
PHOSPHATE SERPL-MCNC: 3.6 MG/DL (ref 2.5–4.5)
POTASSIUM SERPL-SCNC: 4.4 MMOL/L (ref 3.5–5.2)
PREALB SERPL-MCNC: 14.4 MG/DL (ref 20–40)
PROT SERPL-MCNC: 6.1 G/DL (ref 6–8.5)
PROTHROMBIN TIME: 14.2 SECONDS (ref 12.2–14.5)
SODIUM SERPL-SCNC: 138 MMOL/L (ref 136–145)
TRIGL SERPL-MCNC: 219 MG/DL (ref 0–150)

## 2024-08-12 PROCEDURE — 84100 ASSAY OF PHOSPHORUS: CPT

## 2024-08-12 PROCEDURE — 82330 ASSAY OF CALCIUM: CPT

## 2024-08-12 PROCEDURE — 85610 PROTHROMBIN TIME: CPT

## 2024-08-12 PROCEDURE — 82948 REAGENT STRIP/BLOOD GLUCOSE: CPT

## 2024-08-12 PROCEDURE — 83735 ASSAY OF MAGNESIUM: CPT

## 2024-08-12 PROCEDURE — 84134 ASSAY OF PREALBUMIN: CPT

## 2024-08-12 PROCEDURE — 84478 ASSAY OF TRIGLYCERIDES: CPT

## 2024-08-12 PROCEDURE — 25010000002 HEPARIN (PORCINE) PER 1000 UNITS: Performed by: COLON & RECTAL SURGERY

## 2024-08-12 PROCEDURE — 80053 COMPREHEN METABOLIC PANEL: CPT

## 2024-08-12 PROCEDURE — 86140 C-REACTIVE PROTEIN: CPT

## 2024-08-12 PROCEDURE — 25010000002 THIAMINE HCL 200 MG/2ML SOLUTION

## 2024-08-12 PROCEDURE — 82248 BILIRUBIN DIRECT: CPT

## 2024-08-12 RX ORDER — AMLODIPINE BESYLATE 5 MG/1
5 TABLET ORAL
Qty: 30 TABLET | Refills: 0 | Status: SHIPPED | OUTPATIENT
Start: 2024-08-13

## 2024-08-12 RX ORDER — FOLIC ACID 1 MG/1
1 TABLET ORAL DAILY
Status: DISCONTINUED | OUTPATIENT
Start: 2024-08-13 | End: 2024-08-12 | Stop reason: HOSPADM

## 2024-08-12 RX ORDER — HYDROCODONE BITARTRATE AND ACETAMINOPHEN 5; 325 MG/1; MG/1
1 TABLET ORAL EVERY 4 HOURS PRN
Qty: 15 TABLET | Refills: 0 | Status: SHIPPED | OUTPATIENT
Start: 2024-08-12

## 2024-08-12 RX ORDER — METHOCARBAMOL 500 MG/1
500 TABLET, FILM COATED ORAL 4 TIMES DAILY
Qty: 15 TABLET | Refills: 0 | Status: SHIPPED | OUTPATIENT
Start: 2024-08-12

## 2024-08-12 RX ADMIN — Medication 10 ML: at 08:48

## 2024-08-12 RX ADMIN — DIAZEPAM 5 MG: 5 TABLET ORAL at 08:47

## 2024-08-12 RX ADMIN — DOXYCYCLINE 100 MG: 100 CAPSULE ORAL at 08:47

## 2024-08-12 RX ADMIN — ACETAMINOPHEN 650 MG: 325 TABLET ORAL at 04:40

## 2024-08-12 RX ADMIN — AMOXICILLIN AND CLAVULANATE POTASSIUM 1 TABLET: 875; 125 TABLET, FILM COATED ORAL at 08:47

## 2024-08-12 RX ADMIN — HEPARIN SODIUM 5000 UNITS: 5000 INJECTION INTRAVENOUS; SUBCUTANEOUS at 13:50

## 2024-08-12 RX ADMIN — ACETAMINOPHEN 650 MG: 325 TABLET ORAL at 13:03

## 2024-08-12 RX ADMIN — FOLIC ACID 1 MG: 5 INJECTION, SOLUTION INTRAMUSCULAR; INTRAVENOUS; SUBCUTANEOUS at 08:47

## 2024-08-12 RX ADMIN — THIAMINE HYDROCHLORIDE 100 MG: 100 INJECTION, SOLUTION INTRAMUSCULAR; INTRAVENOUS at 08:47

## 2024-08-12 RX ADMIN — LEVOTHYROXINE SODIUM 25 MCG: 25 TABLET ORAL at 04:40

## 2024-08-12 RX ADMIN — AMLODIPINE BESYLATE 5 MG: 5 TABLET ORAL at 08:47

## 2024-08-12 NOTE — CASE MANAGEMENT/SOCIAL WORK
Continued Stay Note   [place]     Patient Name: Naila Landin  MRN: [id number]  Today's Date: 8/12/2024    Admit Date: 8/6/2024    Plan: Home with spouse & [facility]   Discharge Plan       Row Name 08/12/24 1129       Plan    Plan Home with spouse & [facility]    Patient/Family in Agreement with Plan yes    Plan Comments Spoke to patient at bedside. Her plan remains home with spouse and [facility] for SN, PT/OT. Order in [facility]. Therapy recommends home with assist and home health. Family to transport per patient.  Patient denies any other discharge needs at this time. CM will continue to follow.    Final Discharge Disposition Code 06 - home with home health care                   Discharge Codes    No documentation.                       Amy Vega RN

## 2024-08-12 NOTE — DISCHARGE SUMMARY
Patient Name: Naila Landin  MRN: [id number]  : 1940  DOS: 2024    Attending: Caleb Hirsch MD    Primary Care Provider: Albert Rincon MD    Date of Admission:.2024 11:14 AM    Date of Discharge:  2024    Discharge Diagnosis:   S/P right colectomy, laparoscopic with extensive JESSICA    Cecal neoplasm    HTN (hypertension)    Hypothyroidism (acquired)    Elevated hemoglobin A1c      Hospital Course  At admit    ***    After admit    *** was provided pain medication as needed for pain control.  *** received DVT prophylaxis with subcutaneous *** as well as mechanicals      Patient was started on clear liquid diet, this was advanced when ***showed evidence of bowel function return.      Tolerated diet without difficulty.    ***  used an IS for atelectasis prophylaxis.    Home medications were resumed as appropriate, and labs were monitored and remained fairly stable.    With the progress *** has made, pt is ready for DC home today.      Discussed with patient regarding plan and ***shows understanding and agreement.       Procedures Performed  Procedure(s):  OPEN RIGHT COLECTOMY, LAPAROSCOPIC LYSIS OF ADHESIONS       Pertinent Test Results:    I reviewed the patient's new clinical results.   Results from last 7 days   Lab Units 08/10/24  0527 24  0443 24  1231   WBC 10*3/mm3 8.65 9.53 15.89*   HEMOGLOBIN g/dL 11.2* 10.7* 12.6   HEMATOCRIT % 34.1 33.7* 40.0   PLATELETS 10*3/mm3 176 174 222     Results from last 7 days   Lab Units 24  0410 24  0541 24  0443 24  1231   SODIUM mmol/L 138 135* 140 142   POTASSIUM mmol/L 4.4 4.1 3.8 4.1   CHLORIDE mmol/L 108* 104 108* 103   CO2 mmol/L 23.0 21.0* 25.0 28.0   BUN mg/dL 19 15 12 11   CREATININE mg/dL 0.55* 0.55* 0.58 0.65   CALCIUM mg/dL 8.9 8.0* 7.8* 8.5*   BILIRUBIN mg/dL 0.4 0.4  --  0.7  0.7   ALK PHOS U/L 76 72  --  89  89   ALT (SGPT) U/L 14 12  --  17  17   AST (SGOT) U/L 18 17  --  19  19   GLUCOSE  "mg/dL 135* 163* 150* 148*     I reviewed the patient's new imaging including images and reports.          Physical therapy    Discharge Assessment:    Vital Signs  Visit Vitals  /75   Pulse 87   Temp 98.1 °F (36.7 °C) (Temporal)   Resp 16   Ht 166.4 cm (65.5\")   Wt 90.5 kg (199 lb 8 oz)   SpO2 93%   BMI 32.69 kg/m²     Temp (24hrs), Av.3 °F (36.8 °C), Min:98 °F (36.7 °C), Max:99.1 °F (37.3 °C)      General Appearance:    Alert, cooperative, in no acute distress   Lungs:     Clear to auscultation,respirations regular, even and                   unlabored    Heart:    Regular rhythm and normal rate, normal S1 and S2    Abdomen:   Soft and benign with clean incisions   Extremities:   Moves all extremities well, no edema, no cyanosis, no              redness   Pulses:   Pulses palpable and equal bilaterally   Skin:   No bleeding, bruising or rash            Discharge Disposition:          Discharge Medications        New Medications        Instructions Start Date   amLODIPine 5 MG tablet  Commonly known as: NORVASC   5 mg, Oral, Every 24 Hours Scheduled   Start Date: 2024     HYDROcodone-acetaminophen 5-325 MG per tablet  Commonly known as: Norco   1 tablet, Oral, Every 4 Hours PRN      methocarbamol 500 MG tablet  Commonly known as: ROBAXIN   500 mg, Oral, 4 Times Daily             Continue These Medications        Instructions Start Date   latanoprost 0.005 % ophthalmic solution  Commonly known as: XALATAN   1 drop, Both Eyes, Nightly      levothyroxine 25 MCG tablet  Commonly known as: SYNTHROID, LEVOTHROID   25 mcg, Oral, Every Early Morning      metoprolol succinate  MG 24 hr tablet  Commonly known as: TOPROL-XL   100 mg, Oral, Nightly      multivitamin with minerals tablet tablet   1 tablet, Oral, Daily               Discharge Diet:     Activity at Discharge:     Follow-up Appointments:  No future appointments.     Dragon disclaimer:  Part of this encounter note is an electronic " transcription/translation of spoken language to printed text. The electronic translation of spoken language may permit erroneous, or at times, nonsensical words or phrases to be inadvertently transcribed; Although I have reviewed the note for such errors, some may still exist.       David Amaya MD  08/12/24  13:57 EDT         Morning      metoprolol succinate  MG 24 hr tablet  Commonly known as: TOPROL-XL   100 mg, Oral, Nightly      multivitamin with minerals tablet tablet   1 tablet, Oral, Daily               Discharge Diet: GI soft.  Low fiber diet.    Activity at Discharge: Ambulate.  Restrictions per Dr. Hirsch.    Follow-up Appointments:  Caleb Hirsch MD.  2 weeks.    Discussed with patient and her daughter.  Discussed with RN.    Discharge took over 30 min    Dragon disclaimer:  Part of this encounter note is an electronic transcription/translation of spoken language to printed text. The electronic translation of spoken language may permit erroneous, or at times, nonsensical words or phrases to be inadvertently transcribed; Although I have reviewed the note for such errors, some may still exist.       David Amaya MD  08/12/24  13:57 EDT

## 2024-08-12 NOTE — PLAN OF CARE
Goal Outcome Evaluation:           Problem: Adult Inpatient Plan of Care  Goal: Plan of Care Review  Outcome: Ongoing, Progressing  Goal: Patient-Specific Goal (Individualized)  Outcome: Ongoing, Progressing  Goal: Absence of Hospital-Acquired Illness or Injury  Outcome: Ongoing, Progressing  Goal: Optimal Comfort and Wellbeing  Outcome: Ongoing, Progressing  Goal: Readiness for Transition of Care  Outcome: Ongoing, Progressing     Problem: Skin Injury Risk Increased  Goal: Skin Health and Integrity  Outcome: Ongoing, Progressing

## 2024-08-12 NOTE — NURSING NOTE
Prior to central line removal, order for the removal of catheter was verified, patient was assessed, necessary materials were gathered and patient was educated regarding procedure .    Patient was positioned supine to ensure that the insertion site was at or below the level of the heart.    Hands were washed, clean gloves were applied and central line dressing was gently removed. Catheter exit site was not cultured.     A new pair of clean gloves were then applied. Insertion site was cleansed with 2% Chlorhexidine swab using a circular motion beginning at the insertion site and moving outward for 30 seconds and allowed to dry.     Clamp on line was not present.     Patient was instructed to perform Valsalva maneuver as catheter was withdrawn.     The central line was grasped at the insertion site and slowly pulled outward parallel to the skin. Resistance was not met.    After central line was completely removed, a sterile 4x4 gauze pad was used to apply light pressure until bleeding stopped. At that time, petroleum-based gauze and a sterile occlusive dressing was applied to exit site.     Patient was instructed to keep dressing in place for at least 24 hours and to remain in a flat or reclining position for 30 minutes post-removal.     Catheter was inspected after removal and was intact. Tip of central line was not sent for culture. Patient tolerated procedure.

## 2024-08-12 NOTE — CONSULTS
Clinical Nutrition     Patient Name: Naila Landin  YOB: 1940  MRN: [id number]  Date of Encounter: 08/11/24 23:40 EDT  Admission date: 8/6/2024  Reason for Visit: Follow-up protocol    Assessment   Nutrition Assessment   Admission Diagnosis:  Cecal neoplasm [D49.0]    Problem List:    S/P right colectomy, laparoscopic with extensive JESSICA    Cecal neoplasm    HTN (hypertension)    Hypothyroidism (acquired)    Elevated hemoglobin A1c      PMH:   She  has a past medical history of Cecal neoplasm, Disease of thyroid gland, Diverticulitis, Hypertension, and Urinary frequency.    PSH:  She  has a past surgical history that includes Colon surgery (2012); Other surgical history; Knee Arthroplasty (Right, 2024); Colonoscopy; Hysterectomy; Cholecystectomy; Appendectomy; Abdominal hernia repair; Tonsillectomy; and Colectomy (Right, 8/6/2024).    Applicable Nutrition History:   (8/6) s/p lap JESSICA, R colectomy  (8/8) PICC placement pending  PN Initiated  (8/11) Weaned off PN    Labs    Labs Reviewed: Yes    Results from last 7 days   Lab Units 08/11/24  0541 08/10/24  0527 08/09/24  1423 08/09/24  0443 08/08/24  1231   GLUCOSE mg/dL 163*  --   --  150* 148*   BUN mg/dL 15  --   --  12 11   CREATININE mg/dL 0.55*  --   --  0.58 0.65   SODIUM mmol/L 135*  --   --  140 142   CHLORIDE mmol/L 104  --   --  108* 103   POTASSIUM mmol/L 4.1  --   --  3.8 4.1   PHOSPHORUS mg/dL 2.6 2.3* 1.7* 1.9* 2.1*   MAGNESIUM mg/dL 1.9 2.1  --  2.1 2.2   ALT (SGPT) U/L 12  --   --   --  17  17       Results from last 7 days   Lab Units 08/11/24  0541 08/08/24  1231   ALBUMIN g/dL 3.2* 3.9  3.9   PREALBUMIN mg/dL  --  17.7*   CRP mg/dL  --  1.94*   IONIZED CALCIUM mmol/L  --  1.19   CHOLESTEROL mg/dL 118 132   TRIGLYCERIDES mg/dL 206* 224*       Results from last 7 days   Lab Units 08/11/24  1635 08/11/24  1146 08/11/24  0558 08/11/24  0000 08/10/24  1647 08/10/24  1202   GLUCOSE mg/dL 103 109 154* 143* 149* 107  "    Lab Results   Lab Value Date/Time    HGBA1C 6.20 (H) 2024 1347               Medications    Medications Reviewed: yes    Scheduled Meds:acetaminophen, 650 mg, Oral, Q8H  amLODIPine, 5 mg, Oral, Q24H  amoxicillin-clavulanate, 1 tablet, Oral, Q12H  doxycycline, 100 mg, Oral, Q12H  folic acid 1 mg in sodium chloride 0.9 % 50 mL IVPB, 1 mg, Intravenous, Daily  heparin (porcine), 5,000 Units, Subcutaneous, Q8H  insulin regular, 2-7 Units, Subcutaneous, Q6H  latanoprost, 1 drop, Both Eyes, Nightly  levothyroxine, 25 mcg, Oral, Q AM  metoprolol succinate XL, 100 mg, Oral, Nightly  sodium chloride, 10 mL, Intravenous, Q12H  thiamine (B-1) IV, 100 mg, Intravenous, Daily      Continuous Infusions:Pharmacy to Dose TPN,       PRN Meds:.  Calcium Replacement - Follow Nurse / BPA Driven Protocol    cloNIDine    dextrose    dextrose    diazePAM    glucagon (human recombinant)    HYDROcodone-acetaminophen    ibuprofen    Magnesium Standard Dose Replacement - Follow Nurse / BPA Driven Protocol    [] HYDROmorphone **AND** naloxone    nitroglycerin    ondansetron    Pharmacy to Dose TPN    phenol    Phosphorus Replacement - Follow Nurse / BPA Driven Protocol    Potassium Replacement - Follow Nurse / BPA Driven Protocol    sodium chloride    Intake/Ouptut 24 hrs (701 - 700)   I&O's Reviewed: yes  Intake & Output (last day)          0700    P.O. 240    I.V. (mL/kg)     IV Piggyback 250    TPN 1738.9    Total Intake(mL/kg) 2228.9 (25)    Urine (mL/kg/hr) 0 (0)    Drains 150    Stool 0    Total Output 150    Net +2078.9         Urine Unmeasured Occurrence 6 x    Stool Unmeasured Occurrence 4 x          Anthropometrics     Height: Height: 166.4 cm (65.5\")  Last Filed Weight: Weight: 89 kg (196 lb 3.2 oz) (24 0600)  Method: Weight Method: Standing scale  BMI: BMI (Calculated): 32.1    UBW: 200#  Weight change: weight loss of 7 lbs (3.5%) over 2 week(s)    Significant?  No    Nutrition Focused " "Physical Exam     Date: 8/8    Pt does not meet criteria for malnutrition diagnosis, at this time.      Subjective   Reported/Observed/Food/Nutrition Related History:     8/11  Pt weaned off PN. Allows ok w food. Discharge anticipated for 8/12    8/10  PN at goal no labs for evaluation Pt obs walking - settled in bed comfortably.     8/9  PN initiated yesterday evening. Noted to be hypophosphatemic on AM draw. NGT remains to LWS - 1.3L out. C/o sore throat but using chloraseptic spray q 2hrs. Asked about when NG would be removed, educated on usual timeline, verbalized understanding. Chewing gum. LBM 8/6 2/2 bowel prep - No BM or flatus at this time.     8/8  Consult received for PN. Pt is POD#2 s/p R colectomy with JESSICA, NG in place to LWS with 1.9L output documented. Concern for ileus per CRS. Pt reports drinking Impact AR x8 prior to surgery. Reports last meal on Sunday (~4 days ago). Discussed plan for initiating TPN, pt and daughter verbalized understanding. NKFA    Needs Assessment   Date: 8/8    Height used:Height: 166.4 cm (65.5\")  Weights used: 87.5kg CBW    Estimated Calorie needs: ~ 1625Kcal/day  Method: 17-20 Kcals/KG = 0539-1589  Method:  MSJ (1347) x 1.2 = 1616    Estimated Protein needs: ~ 125 g PRO/day  Method: 1.3-1.5g/Kg = 114-131    Estimated Fluid needs: ~ ml/day   Per clinical status    Current Nutrition Prescription     PO: Diet: Gastrointestinal; Fiber-Restricted, Low Irritant; Texture: Soft to Chew (NDD 3); Soft to Chew: Whole Meat; Fluid Consistency: Thin (IDDSI 0)  Oral Nutrition Supplement: Impact AR 2x/da on order  Intake: 2 Days: 75% x 4 meals recorded    Discontinued:   PN Route: PICC  PN: 5.5% dextrose, 6.9% AA @ 75mL/hr               GIR:  0.79mg/kg/min   Lipid: 200mL 20% SMOF daily     PN@ Goal over 24hr to Supply:   Volume 1800 mL/day     Energy 1237 Kcal/day 76 % Est Need   Protein 125 g/day 100 % Est Need "     ---------------------------------------------------------------------------  Formula/Rate verified at bedside: No  Infusing Rate at time of visit: weaned off    Average Delivery from Charting  1 Day: per documentation  PN Volume 1516.5 mL/day     Lipid delivered  222 ml     Energy 1148 Kcal/day 71 % Est Need   Protein 105 g/day 84 % Est Need     Assessment & Plan   Nutrition Diagnosis   Date: 8/8 Updated: 8/9, 8/10.8/11  Problem Inadequate oral intake    Etiology Altered GI fxn   Signs/Symptoms NPO >48hrs, consult for PN    Status:  Resolved  PO intake at 75% PN off    Goal:   Nutrition to support treatment and Maintain intake      Nutrition Intervention      Follow treatment progress, Care plan reviewed    Assured pt has menu to use. Anticipate discharge 8/12    Monitoring/Evaluation:   Per protocol, I&O, PO intake, Supplement intake, Pertinent labs, Weight, GI status, Symptoms if adm extended    Mihaela Mehta RD  Time Spent: 30min

## 2024-08-13 ENCOUNTER — READMISSION MANAGEMENT (OUTPATIENT)
Dept: CALL CENTER | Facility: HOSPITAL | Age: 84
End: 2024-08-13
Payer: MEDICARE

## 2024-08-13 NOTE — OUTREACH NOTE
Prep Survey      Flowsheet Row Responses   [facility] patient discharged from? [place]   Is LACE score < 7 ? No   Eligibility Readm Mgmt   Discharge diagnosis OPEN RIGHT COLECTOMY, LAPAROSCOPIC LYSIS OF ADHESIONS   Does the patient have one of the following disease processes/diagnoses(primary or secondary)? General Surgery   Does the patient have Home health ordered? Yes   What is the Home health agency?  [facility]   Is there a DME ordered? No   Prep survey completed? Yes            Rani DOW - Registered Nurse

## 2024-08-16 ENCOUNTER — READMISSION MANAGEMENT (OUTPATIENT)
Dept: CALL CENTER | Facility: HOSPITAL | Age: 84
End: 2024-08-16
Payer: MEDICARE

## 2024-08-16 NOTE — OUTREACH NOTE
General Surgery Week 1 Survey      Flowsheet Row Responses   [facility] facility patient discharged from? [place]   Does the patient have one of the following disease processes/diagnoses(primary or secondary)? General Surgery   Week 1 attempt successful? No   Unsuccessful attempts Attempt 1            Anahy MCGINNIS - Registered Nurse

## 2024-08-20 ENCOUNTER — READMISSION MANAGEMENT (OUTPATIENT)
Dept: CALL CENTER | Facility: HOSPITAL | Age: 84
End: 2024-08-20
Payer: MEDICARE

## 2024-08-20 NOTE — OUTREACH NOTE
General Surgery Week 1 Survey      Flowsheet Row Responses   [facility] patient discharged from? [place]   Does the patient have one of the following disease processes/diagnoses(primary or secondary)? General Surgery   Week 1 attempt successful? Yes   Call start time 1602   Call end time 1606   Discharge diagnosis OPEN RIGHT COLECTOMY, LAPAROSCOPIC LYSIS OF ADHESIONS   Meds reviewed with patient/caregiver? Yes   Is the patient having any side effects they believe may be caused by any medication additions or changes? No   Does the patient have all medications related to this admission filled (includes all antibiotics, pain medications, etc.) Yes   Is the patient taking all medications as directed (includes completed medication regime)? Yes   Does the patient have a follow up appointment scheduled with their surgeon? Yes   Has the patient kept scheduled appointments due by today? N/A   What is the Home health agency?  [facility]   Has home health visited the patient within 72 hours of discharge? Yes   Home health comments HH x2 visits, no more visits needed   Psychosocial issues? No   Did the patient receive a copy of their discharge instructions? Yes   Nursing interventions Reviewed instructions with patient   What is the patient's perception of their health status since discharge? Improving   Nursing interventions Nurse provided patient education   Is the patient /caregiver able to teach back basic post-op care? Continue use of incentive spirometry at least 1 week post discharge, Practice 'cough and deep breath', Drive as instructed by MD in discharge instructions, Take showers only when approved by MD-sponge bathe until then, No tub bath, swimming, or hot tub until instructed by MD, Keep incision areas clean,dry and protected, Do not remove steri-strips, Lifting as instructed by MD in discharge instructions   Is the patient/caregiver able to teach back signs and symptoms of incisional infection?  Increased redness, swelling or pain at the incisonal site, Increased drainage or bleeding, Incisional warmth, Pus or odor from incision, Fever   Is the patient/caregiver able to teach back steps to recovery at home? Set small, achievable goals for return to baseline health, Rest and rebuild strength, gradually increase activity, Eat a well-balance diet   If the patient is a current smoker, are they able to teach back resources for cessation? Not a smoker   Is the patient/caregiver able to teach back the hierarchy of who to call/visit for symptoms/problems? PCP, Specialist, Home health nurse, Urgent Care, ED, 911 Yes   Additional teach back comments takes walks, progressing   Week 1 call completed? Yes   Call end time 1606            Lia DOW - Registered Nurse

## 2024-08-28 ENCOUNTER — READMISSION MANAGEMENT (OUTPATIENT)
Dept: CALL CENTER | Facility: HOSPITAL | Age: 84
End: 2024-08-28
Payer: MEDICARE

## 2024-08-28 NOTE — OUTREACH NOTE
General Surgery Week 2 Survey      Flowsheet Row Responses   [facility] patient discharged from? [place]   Does the patient have one of the following disease processes/diagnoses(primary or secondary)? General Surgery   Week 2 attempt successful? No   Unsuccessful attempts Attempt 1   Discharge diagnosis OPEN RIGHT COLECTOMY, LAPAROSCOPIC LYSIS OF ADHESIONS            SHARATH CISNEROS - Registered Nurse

## 2025-06-25 ENCOUNTER — HOSPITAL ENCOUNTER (INPATIENT)
Facility: HOSPITAL | Age: 85
LOS: 1 days | Discharge: HOME | End: 2025-06-27
Attending: EMERGENCY MEDICINE | Admitting: FAMILY MEDICINE
Payer: MEDICARE

## 2025-06-25 ENCOUNTER — APPOINTMENT (OUTPATIENT)
Dept: CT IMAGING | Facility: HOSPITAL | Age: 85
End: 2025-06-25
Payer: MEDICARE

## 2025-06-25 ENCOUNTER — APPOINTMENT (OUTPATIENT)
Dept: GENERAL RADIOLOGY | Facility: HOSPITAL | Age: 85
End: 2025-06-25
Payer: MEDICARE

## 2025-06-25 DIAGNOSIS — R53.1 ACUTE LEFT-SIDED WEAKNESS: Primary | ICD-10-CM

## 2025-06-25 DIAGNOSIS — R20.2 INTERMITTENT PARESTHESIA OF LEFT HAND AND FOOT: ICD-10-CM

## 2025-06-25 DIAGNOSIS — G45.9 TIA (TRANSIENT ISCHEMIC ATTACK): ICD-10-CM

## 2025-06-25 LAB
ALBUMIN SERPL-MCNC: 3.8 G/DL (ref 3.5–5.2)
ALBUMIN/GLOB SERPL: 1.4 G/DL
ALP SERPL-CCNC: 86 U/L (ref 39–117)
ALT SERPL W P-5'-P-CCNC: 47 U/L (ref 1–33)
ANION GAP SERPL CALCULATED.3IONS-SCNC: 11 MMOL/L (ref 5–15)
APTT PPP: 30.7 SECONDS (ref 22–39)
AST SERPL-CCNC: 75 U/L (ref 1–32)
BASOPHILS # BLD AUTO: 0.06 10*3/MM3 (ref 0–0.2)
BASOPHILS NFR BLD AUTO: 0.7 % (ref 0–1.5)
BILIRUB SERPL-MCNC: 0.4 MG/DL (ref 0–1.2)
BUN BLDA-MCNC: 19 MG/DL (ref 8–26)
BUN SERPL-MCNC: 17.3 MG/DL (ref 8–23)
BUN/CREAT SERPL: 21.4 (ref 7–25)
CA-I BLDA-SCNC: 1.22 MMOL/L (ref 1.2–1.32)
CALCIUM SPEC-SCNC: 8.7 MG/DL (ref 8.6–10.5)
CHLORIDE BLDA-SCNC: 103 MMOL/L (ref 98–109)
CHLORIDE SERPL-SCNC: 102 MMOL/L (ref 98–107)
CO2 BLDA-SCNC: 23 MMOL/L (ref 24–29)
CO2 SERPL-SCNC: 25 MMOL/L (ref 22–29)
CREAT BLDA-MCNC: 1 MG/DL (ref 0.6–1.3)
CREAT SERPL-MCNC: 0.81 MG/DL (ref 0.57–1)
DEPRECATED RDW RBC AUTO: 45.8 FL (ref 37–54)
EGFRCR SERPLBLD CKD-EPI 2021: 56 ML/MIN/1.73
EGFRCR SERPLBLD CKD-EPI 2021: 72.1 ML/MIN/1.73
EOSINOPHIL # BLD AUTO: 0.16 10*3/MM3 (ref 0–0.4)
EOSINOPHIL NFR BLD AUTO: 1.7 % (ref 0.3–6.2)
ERYTHROCYTE [DISTWIDTH] IN BLOOD BY AUTOMATED COUNT: 12.8 % (ref 12.3–15.4)
GLOBULIN UR ELPH-MCNC: 2.8 GM/DL
GLUCOSE BLDC GLUCOMTR-MCNC: 123 MG/DL (ref 70–130)
GLUCOSE BLDC GLUCOMTR-MCNC: 136 MG/DL (ref 70–130)
GLUCOSE BLDC GLUCOMTR-MCNC: 191 MG/DL (ref 70–130)
GLUCOSE SERPL-MCNC: 129 MG/DL (ref 65–99)
HCT VFR BLD AUTO: 40.8 % (ref 34–46.6)
HCT VFR BLDA CALC: 41 % (ref 38–51)
HGB BLD-MCNC: 13.2 G/DL (ref 12–15.9)
HGB BLDA-MCNC: 13.9 G/DL (ref 12–17)
HOLD SPECIMEN: NORMAL
IMM GRANULOCYTES # BLD AUTO: 0.02 10*3/MM3 (ref 0–0.05)
IMM GRANULOCYTES NFR BLD AUTO: 0.2 % (ref 0–0.5)
INR PPP: 1.1 (ref 0.89–1.12)
LYMPHOCYTES # BLD AUTO: 2.99 10*3/MM3 (ref 0.7–3.1)
LYMPHOCYTES NFR BLD AUTO: 32.6 % (ref 19.6–45.3)
MCH RBC QN AUTO: 31.4 PG (ref 26.6–33)
MCHC RBC AUTO-ENTMCNC: 32.4 G/DL (ref 31.5–35.7)
MCV RBC AUTO: 96.9 FL (ref 79–97)
MONOCYTES # BLD AUTO: 0.74 10*3/MM3 (ref 0.1–0.9)
MONOCYTES NFR BLD AUTO: 8.1 % (ref 5–12)
NEUTROPHILS NFR BLD AUTO: 5.2 10*3/MM3 (ref 1.7–7)
NEUTROPHILS NFR BLD AUTO: 56.7 % (ref 42.7–76)
NRBC BLD AUTO-RTO: 0 /100 WBC (ref 0–0.2)
PLATELET # BLD AUTO: 233 10*3/MM3 (ref 140–450)
PMV BLD AUTO: 10.7 FL (ref 6–12)
POTASSIUM BLDA-SCNC: 3.8 MMOL/L (ref 3.5–4.9)
POTASSIUM SERPL-SCNC: 3.9 MMOL/L (ref 3.5–5.2)
PROT SERPL-MCNC: 6.6 G/DL (ref 6–8.5)
PROTHROMBIN TIME: 14.9 SECONDS (ref 12.2–15.3)
QT INTERVAL: 420 MS
QTC INTERVAL: 459 MS
RBC # BLD AUTO: 4.21 10*6/MM3 (ref 3.77–5.28)
SODIUM BLD-SCNC: 139 MMOL/L (ref 138–146)
SODIUM SERPL-SCNC: 138 MMOL/L (ref 136–145)
WBC NRBC COR # BLD AUTO: 9.17 10*3/MM3 (ref 3.4–10.8)
WHOLE BLOOD HOLD COAG: NORMAL
WHOLE BLOOD HOLD SPECIMEN: NORMAL

## 2025-06-25 PROCEDURE — 70450 CT HEAD/BRAIN W/O DYE: CPT

## 2025-06-25 PROCEDURE — G0378 HOSPITAL OBSERVATION PER HR: HCPCS

## 2025-06-25 PROCEDURE — 99223 1ST HOSP IP/OBS HIGH 75: CPT

## 2025-06-25 PROCEDURE — 93005 ELECTROCARDIOGRAM TRACING: CPT | Performed by: EMERGENCY MEDICINE

## 2025-06-25 PROCEDURE — 70498 CT ANGIOGRAPHY NECK: CPT

## 2025-06-25 PROCEDURE — 85014 HEMATOCRIT: CPT

## 2025-06-25 PROCEDURE — 70496 CT ANGIOGRAPHY HEAD: CPT

## 2025-06-25 PROCEDURE — 99291 CRITICAL CARE FIRST HOUR: CPT

## 2025-06-25 PROCEDURE — 85730 THROMBOPLASTIN TIME PARTIAL: CPT | Performed by: EMERGENCY MEDICINE

## 2025-06-25 PROCEDURE — 85610 PROTHROMBIN TIME: CPT | Performed by: EMERGENCY MEDICINE

## 2025-06-25 PROCEDURE — 25510000001 IOPAMIDOL PER 1 ML: Performed by: EMERGENCY MEDICINE

## 2025-06-25 PROCEDURE — 71045 X-RAY EXAM CHEST 1 VIEW: CPT

## 2025-06-25 PROCEDURE — 85025 COMPLETE CBC W/AUTO DIFF WBC: CPT | Performed by: EMERGENCY MEDICINE

## 2025-06-25 PROCEDURE — 82948 REAGENT STRIP/BLOOD GLUCOSE: CPT

## 2025-06-25 PROCEDURE — 99223 1ST HOSP IP/OBS HIGH 75: CPT | Performed by: FAMILY MEDICINE

## 2025-06-25 PROCEDURE — 80047 BASIC METABLC PNL IONIZED CA: CPT

## 2025-06-25 PROCEDURE — 80053 COMPREHEN METABOLIC PANEL: CPT

## 2025-06-25 PROCEDURE — 0042T HC CT CEREBRAL PERFUSION W/WO CONTRAST: CPT

## 2025-06-25 RX ORDER — SODIUM CHLORIDE 9 MG/ML
40 INJECTION, SOLUTION INTRAVENOUS AS NEEDED
Status: DISCONTINUED | OUTPATIENT
Start: 2025-06-25 | End: 2025-06-27 | Stop reason: HOSPADM

## 2025-06-25 RX ORDER — FLUTICASONE PROPIONATE 50 MCG
2 SPRAY, SUSPENSION (ML) NASAL DAILY PRN
COMMUNITY

## 2025-06-25 RX ORDER — CLOPIDOGREL BISULFATE 75 MG/1
75 TABLET ORAL DAILY
Status: DISCONTINUED | OUTPATIENT
Start: 2025-06-26 | End: 2025-06-27 | Stop reason: HOSPADM

## 2025-06-25 RX ORDER — ALENDRONATE SODIUM 70 MG/1
70 TABLET ORAL
COMMUNITY

## 2025-06-25 RX ORDER — NITROGLYCERIN 0.4 MG/1
0.4 TABLET SUBLINGUAL
Status: DISCONTINUED | OUTPATIENT
Start: 2025-06-25 | End: 2025-06-27 | Stop reason: HOSPADM

## 2025-06-25 RX ORDER — LATANOPROST 50 UG/ML
1 SOLUTION/ DROPS OPHTHALMIC NIGHTLY
Status: DISCONTINUED | OUTPATIENT
Start: 2025-06-25 | End: 2025-06-27 | Stop reason: HOSPADM

## 2025-06-25 RX ORDER — ATORVASTATIN CALCIUM 40 MG/1
80 TABLET, FILM COATED ORAL NIGHTLY
Status: DISCONTINUED | OUTPATIENT
Start: 2025-06-25 | End: 2025-06-25

## 2025-06-25 RX ORDER — AMLODIPINE BESYLATE 5 MG/1
5 TABLET ORAL
Status: DISCONTINUED | OUTPATIENT
Start: 2025-06-26 | End: 2025-06-27 | Stop reason: HOSPADM

## 2025-06-25 RX ORDER — IOPAMIDOL 755 MG/ML
115 INJECTION, SOLUTION INTRAVASCULAR
Status: COMPLETED | OUTPATIENT
Start: 2025-06-25 | End: 2025-06-25

## 2025-06-25 RX ORDER — ASPIRIN 81 MG/1
81 TABLET, CHEWABLE ORAL DAILY
Status: DISCONTINUED | OUTPATIENT
Start: 2025-06-26 | End: 2025-06-27 | Stop reason: HOSPADM

## 2025-06-25 RX ORDER — AMOXICILLIN 250 MG
2 CAPSULE ORAL 2 TIMES DAILY PRN
Status: DISCONTINUED | OUTPATIENT
Start: 2025-06-25 | End: 2025-06-27 | Stop reason: HOSPADM

## 2025-06-25 RX ORDER — SODIUM CHLORIDE 0.9 % (FLUSH) 0.9 %
10 SYRINGE (ML) INJECTION AS NEEDED
Status: DISCONTINUED | OUTPATIENT
Start: 2025-06-25 | End: 2025-06-27 | Stop reason: HOSPADM

## 2025-06-25 RX ORDER — ATORVASTATIN CALCIUM 40 MG/1
40 TABLET, FILM COATED ORAL NIGHTLY
Status: DISCONTINUED | OUTPATIENT
Start: 2025-06-25 | End: 2025-06-27 | Stop reason: HOSPADM

## 2025-06-25 RX ORDER — POLYETHYLENE GLYCOL 3350 17 G/17G
17 POWDER, FOR SOLUTION ORAL DAILY PRN
Status: DISCONTINUED | OUTPATIENT
Start: 2025-06-25 | End: 2025-06-27 | Stop reason: HOSPADM

## 2025-06-25 RX ORDER — SODIUM CHLORIDE 0.9 % (FLUSH) 0.9 %
10 SYRINGE (ML) INJECTION EVERY 12 HOURS SCHEDULED
Status: DISCONTINUED | OUTPATIENT
Start: 2025-06-25 | End: 2025-06-27 | Stop reason: HOSPADM

## 2025-06-25 RX ORDER — ASPIRIN 325 MG
325 TABLET ORAL ONCE
Status: COMPLETED | OUTPATIENT
Start: 2025-06-25 | End: 2025-06-25

## 2025-06-25 RX ORDER — BISACODYL 10 MG
10 SUPPOSITORY, RECTAL RECTAL DAILY PRN
Status: DISCONTINUED | OUTPATIENT
Start: 2025-06-25 | End: 2025-06-27 | Stop reason: HOSPADM

## 2025-06-25 RX ORDER — BISACODYL 5 MG/1
5 TABLET, DELAYED RELEASE ORAL DAILY PRN
Status: DISCONTINUED | OUTPATIENT
Start: 2025-06-25 | End: 2025-06-27 | Stop reason: HOSPADM

## 2025-06-25 RX ORDER — ASPIRIN 300 MG/1
300 SUPPOSITORY RECTAL DAILY
Status: DISCONTINUED | OUTPATIENT
Start: 2025-06-26 | End: 2025-06-27 | Stop reason: HOSPADM

## 2025-06-25 RX ORDER — CLOPIDOGREL BISULFATE 75 MG/1
300 TABLET ORAL ONCE
Status: COMPLETED | OUTPATIENT
Start: 2025-06-25 | End: 2025-06-25

## 2025-06-25 RX ORDER — LEVOTHYROXINE SODIUM 25 UG/1
25 TABLET ORAL
Status: DISCONTINUED | OUTPATIENT
Start: 2025-06-26 | End: 2025-06-27 | Stop reason: HOSPADM

## 2025-06-25 RX ORDER — METOPROLOL SUCCINATE 100 MG/1
100 TABLET, EXTENDED RELEASE ORAL NIGHTLY
Status: DISCONTINUED | OUTPATIENT
Start: 2025-06-25 | End: 2025-06-27 | Stop reason: HOSPADM

## 2025-06-25 RX ADMIN — Medication 10 ML: at 20:39

## 2025-06-25 RX ADMIN — IOPAMIDOL 115 ML: 755 INJECTION, SOLUTION INTRAVENOUS at 15:33

## 2025-06-25 RX ADMIN — CLOPIDOGREL BISULFATE 300 MG: 75 TABLET, FILM COATED ORAL at 16:24

## 2025-06-25 RX ADMIN — LATANOPROST 1 DROP: 50 SOLUTION OPHTHALMIC at 19:30

## 2025-06-25 RX ADMIN — ASPIRIN 325 MG: 325 TABLET ORAL at 16:24

## 2025-06-25 RX ADMIN — ATORVASTATIN CALCIUM 40 MG: 40 TABLET, FILM COATED ORAL at 19:31

## 2025-06-25 NOTE — ED NOTES
" Naila Landin    Nursing Report ED to Floor:  Mental status: A&O x 4   Ambulatory status: standby   Oxygen Therapy:  room air   Cardiac Rhythm: normal sinus   Admitted from: ED  Safety Concerns:  none   Precautions: none   Social Issues: none   ED Room #:  04    ED Nurse Phone Extension - [phone] or may call [phone].      HPI:   Chief Complaint   Patient presents with    Stroke       Past Medical History:  Past Medical History:   Diagnosis Date    Cecal neoplasm     Disease of thyroid gland     Diverticulitis     Hypertension     Urinary frequency         Past Surgical History:  Past Surgical History:   Procedure Laterality Date    ABDOMINAL HERNIA REPAIR      with mesh    APPENDECTOMY      CHOLECYSTECTOMY      COLON RESECTION Right 8/6/2024    Procedure: OPEN RIGHT COLECTOMY, LAPAROSCOPIC LYSIS OF ADHESIONS;  Surgeon: Caleb Hirsch MD;  Location: [facility];  Service: General;  Laterality: Right;    COLON SURGERY  2012    resection due to diverticulitis    COLONOSCOPY      serveral    HYSTERECTOMY      KNEE ARTHROPLASTY Right 2024    OTHER SURGICAL HISTORY      bladder stimulator    TONSILLECTOMY          Admitting Doctor:   Cliare Higginbotham MD    Consulting Provider(s):  Consults       Date and Time Order Name Status Description    6/25/2025  3:19 PM Inpatient Neurology Consult Stroke Completed              Admitting Diagnosis:   The primary encounter diagnosis was Acute left-sided weakness. A diagnosis of Intermittent paresthesia of left hand and foot was also pertinent to this visit.    Most Recent Vitals:   Vitals:    06/25/25 1607 06/25/25 1630   BP: 135/76 147/69   Pulse: 72 68   Resp: 18    Temp: 98.3 °F (36.8 °C)    TempSrc: Oral    SpO2: 95% 95%   Weight: 90.7 kg (200 lb)    Height: 167.6 cm (66\")        Active LDAs/IV Access:   Lines, Drains & Airways       Active LDAs       Name Placement date Placement time Site Days    Peripheral IV 06/25/25 1529 18 G Right Antecubital 06/25/25  1529  Antecubital  " 0934 pt arrived in phase II. A&Ox4, ambulated from gurney to chair, tolerated well. VSS. Dressing CDI, sling in place, pt tolerating PO.    0961 D/c instructions discussed with pt and family, all questions/concerns addressed.     1024 PIV removed. Pt escorted to vehicle with hospital staff in stable condition.    less than 1                    Labs (abnormal labs have a star):   Labs Reviewed   COMPREHENSIVE METABOLIC PANEL - Abnormal; Notable for the following components:       Result Value    Glucose 129 (*)     ALT (SGPT) 47 (*)     AST (SGOT) 75 (*)     All other components within normal limits    Narrative:     GFR Categories in Chronic Kidney Disease (CKD)              GFR Category          GFR (mL/min/1.73)    Interpretation  G1                    90 or greater        Normal or high (1)  G2                    60-89                Mild decrease (1)  G3a                   45-59                Mild to moderate decrease  G3b                   30-44                Moderate to severe decrease  G4                    15-29                Severe decrease  G5                    14 or less           Kidney failure    (1)In the absence of evidence of kidney disease, neither GFR category G1 or G2 fulfill the criteria for CKD.    eGFR calculation 2021 CKD-EPI creatinine equation, which does not include race as a factor   POCT CHEM 8 - Abnormal; Notable for the following components:    Glucose 136 (*)     Total CO2 23 (*)     eGFR 56.0 (*)     All other components within normal limits   PROTIME-INR - Normal   APTT - Normal    Narrative:     PTT = The equivalent PTT values for the therapeutic range of heparin levels at 0.3 to 0.5 U/ml are 60 to 70 seconds.   CBC WITH AUTO DIFFERENTIAL - Normal   RAINBOW DRAW    Narrative:     The following orders were created for panel order [place] Draw.  Procedure                               Abnormality         Status                     ---------                               -----------         ------                     Green Top (Gel)[[id number]]                                  Final result               Lavender Top[[id number]]                                     Final result               Gold Top - SST[[id number]]                                   Final result               Moran  Top[[id number]]                                         Final result               Light Blue Top[[id number]]                                   Final result                 Please view results for these tests on the individual orders.   POCT CHEM 8   CBC AND DIFFERENTIAL    Narrative:     The following orders were created for panel order CBC & Differential.  Procedure                               Abnormality         Status                     ---------                               -----------         ------                     CBC Auto Differential[[id number]]        Normal              Final result                 Please view results for these tests on the individual orders.   GREEN TOP   LAVENDER TOP   GOLD TOP - SST   GRAY TOP   LIGHT BLUE TOP       Meds Given in ED:   Medications   sodium chloride 0.9 % flush 10 mL (has no administration in time range)   clopidogrel (PLAVIX) tablet 300 mg (300 mg Oral Given 6/25/25 1624)     And   clopidogrel (PLAVIX) tablet 75 mg (has no administration in time range)   iopamidol (ISOVUE-370) 76 % injection 115 mL (115 mL Intravenous Given 6/25/25 1533)   aspirin tablet 325 mg (325 mg Oral Given 6/25/25 1624)           Last NIH score:  Interval: baseline  1a. Level of Consciousness: 0-->Alert, keenly responsive  1b. LOC Questions: 0-->Answers both questions correctly  1c. LOC Commands: 0-->Performs both tasks correctly  2. Best Gaze: 0-->Normal  3. Visual: 0-->No visual loss  4. Facial Palsy: 0-->Normal symmetrical movements  5a. Motor Arm, Left: 0-->No drift, limb holds 90 (or 45) degrees for full 10 secs  5b. Motor Arm, Right: 0-->No drift, limb holds 90 (or 45) degrees for full 10 secs  6a. Motor Leg, Left: 0-->No drift, leg holds 30 degree position for full 5 secs  6b. Motor Leg, Right: 0-->No drift, leg holds 30 degree position for full 5 secs  7. Limb Ataxia: 0-->Absent  8. Sensory: 0-->Normal, no sensory loss  9. Best Language: 0-->No aphasia, normal  10. Dysarthria:  0-->Normal  11. Extinction and Inattention (formerly Neglect): 0-->No abnormality    Total (NIH Stroke Scale): 0     Dysphagia screening results:  Patient Factors Component (Dysphagia:Stroke or Rule-out)  Best Eye Response: 4-->(E4) spontaneous (06/25/25 1620)  Best Motor Response: 6-->(M6) obeys commands (06/25/25 1620)  Best Verbal Response: 5-->(V5) oriented (06/25/25 1620)  [place] Coma Scale Score: 15 (06/25/25 1620)  Is there Facial Asymmetry/Weakness?: No (06/25/25 1620)  Is there Tongue Asymmetry/Weakness?: No (06/25/25 1620)  Is there Palatal Asymmetry/Weakness?: No (06/25/25 1620)  Patient Assessment Result: Pass - Proceed to Water Test (06/25/25 1620)     [place] Coma Scale:  No data recorded     CIWA:        Restraint Type:            Isolation Status:  No active isolations

## 2025-06-25 NOTE — CONSULTS
Stroke Consult Note    Patient Name: Naila Landin   MRN: [id number]  Age: 85 y.o.  Sex: female  : 1940    Primary Care Physician: Albert Rincon MD (Inactive)  Referring Physician: Dr. Gilmer Hendrickson    TIME STROKE TEAM CALLED: 1509 EST     TIME PATIENT SEEN: 1520 EST    Handedness: Right   Race:     Chief Complaint/Reason for Consultation: Transient left-sided weakness and numbness    HPI: Mrs. Landin is an 85-year-old female with known medical diagnoses of essential hypertension, hyperlipidemia, obesity, cecal neoplasm s/p laparoscopic right colectomy (), and right breast cancer who presents to the emergency department via EMS for further evaluation of recurrent transient episodes of left-sided weakness and numbness which she first noted approximately 1 hour prior to arrival.  Patient states that she has had 2-3 episodes where her left side becomes weak and numb for approximately 5 minutes before spontaneously resolving.  She denies ever experiencing this in the past.  She denies visual disturbance, speech disturbance, headache, or dizziness.    She tells me she does not currently take any antiplatelet or anticoagulation medications.  She is a non-smoker, denies EtOH use, and denies illicit drug use.    Last Known Normal Date/Time: Patient is currently back to baseline EST     Review of Systems   HENT: Negative.     Eyes: Negative.  Negative for visual disturbance.   Respiratory: Negative.  Negative for shortness of breath.    Cardiovascular: Negative.  Negative for chest pain and palpitations.   Gastrointestinal: Negative.  Negative for diarrhea, nausea and vomiting.   Genitourinary: Negative.  Negative for hematuria.   Musculoskeletal: Negative.    Skin: Negative.    Neurological:  Positive for weakness and numbness. Negative for dizziness, speech difficulty and headaches.      Past Medical History:   Diagnosis Date    Cecal neoplasm     Disease of thyroid gland     Diverticulitis      Hypertension     Urinary frequency      Past Surgical History:   Procedure Laterality Date    ABDOMINAL HERNIA REPAIR      with mesh    APPENDECTOMY      CHOLECYSTECTOMY      COLON RESECTION Right 8/6/2024    Procedure: OPEN RIGHT COLECTOMY, LAPAROSCOPIC LYSIS OF ADHESIONS;  Surgeon: Caleb Hirsch MD;  Location: [facility];  Service: General;  Laterality: Right;    COLON SURGERY  2012    resection due to diverticulitis    COLONOSCOPY      serveral    HYSTERECTOMY      KNEE ARTHROPLASTY Right 2024    OTHER SURGICAL HISTORY      bladder stimulator    TONSILLECTOMY       No family history on file.  Social History     Socioeconomic History    Marital status:    Tobacco Use    Smoking status: Never     Passive exposure: Past    Smokeless tobacco: Never   Vaping Use    Vaping status: Never Used   Substance and Sexual Activity    Alcohol use: Never    Drug use: Never    Sexual activity: Defer     No Known Allergies  Prior to Admission medications    Medication Sig Start Date End Date Taking? Authorizing Provider   amLODIPine (NORVASC) 5 MG tablet Take 1 tablet by mouth Daily. 8/13/24   David Amaya MD   HYDROcodone-acetaminophen (Norco) 5-325 MG per tablet Take 1 tablet by mouth Every 4 (Four) Hours As Needed for Moderate Pain. 8/12/24   David Amaya MD   latanoprost (XALATAN) 0.005 % ophthalmic solution Administer 1 drop to both eyes Every Night.    Chinyere Vega MD   levothyroxine (SYNTHROID, LEVOTHROID) 25 MCG tablet Take 1 tablet by mouth Every Morning.    Chinyere Vega MD   methocarbamol (ROBAXIN) 500 MG tablet Take 1 tablet by mouth 4 (Four) Times a Day. 8/12/24   David Amaya MD   metoprolol succinate XL (TOPROL-XL) 100 MG 24 hr tablet Take 1 tablet by mouth Every Night.    Chinyere Vega MD   multivitamin with minerals (MULTIVITAMIN ADULT PO) Take 1 tablet by mouth Daily.    Chinyere Vega MD            Neurological Exam  Mental Status  Alert.  Oriented to person, place, time and situation. Oriented to person, place, and time. Speech is normal. Language is fluent with no aphasia. Attention and concentration are normal.    Cranial Nerves  CN II: Visual fields full to confrontation.  CN III, IV, VI: Extraocular movements intact bilaterally. Pupils equal round and reactive to light bilaterally.  CN V: Facial sensation is normal.  CN VII: Full and symmetric facial movement.  CN VIII: Hearing appears to be intact bilaterally.  CN XII: Tongue midline without atrophy or fasciculations.    Motor  Normal muscle bulk throughout. Normal muscle tone. Strength is 5/5 throughout all four extremities.    Sensory  Sensation is intact to light touch, pinprick, vibration and proprioception in all four extremities.    Coordination    No obvious dysmetria noted.    Gait    Not observed.      Physical Exam  Vitals reviewed.   Constitutional:       General: She is not in acute distress.     Appearance: Normal appearance. She is not ill-appearing.   HENT:      Head: Normocephalic.      Mouth/Throat:      Mouth: Mucous membranes are moist.   Eyes:      Extraocular Movements: Extraocular movements intact.      Pupils: Pupils are equal, round, and reactive to light.   Cardiovascular:      Rate and Rhythm: Normal rate.   Pulmonary:      Effort: Pulmonary effort is normal. No respiratory distress.      Comments: On room air  Skin:     General: Skin is warm and dry.   Neurological:      General: No focal deficit present.      Mental Status: She is alert and oriented to person, place, and time.      Motor: Motor strength is normal.  Psychiatric:         Mood and Affect: Mood normal.         Speech: Speech normal.         Behavior: Behavior normal.         Acute Stroke Data    Thrombolytic Inclusion / Exclusion Criteria    Time: 15:26 EDT  Person Administering Scale: YVONNE Avendaño      YES NO INCLUSION CRITERIA CLASS I   [] [x]   Suspected diagnosis of acute ischemic  stroke with measureable neurological deficit.  Low NIHSS with disabling stroke symptoms.   [] [x]   Onset of stroke symptoms < 3 hours before beginning treatment >/ 18 years old  Stroke symptom onset = time patient was last seen well or without symptoms (LKW)   [] [x]   Onset of symptoms between 3-4.5 hours: >/= 80 years old (safe Class IIa) with history of   both diabetes and prior CVA  (reasonable Class IIb) AND NIHSS </= 25  *If not eligible for IV Thrombolytic consider neuro intervention for LKW within 24 hours     YES NO EXCLUSION CRITERIA (CONTRAINDICATIONS) CLASS III EVIDENCE HARM   [] []   Blood pressure >185/110 medically refractory to IV medications   [] []   Active bleeding at a non-compressible site   [] []   Active intracranial hemorrhage (ICH)   [] []   Symptoms suggestive of subarachnoid hemorrhage (SAH)   [] []   GI bleed within 21 days   [] []   Ischemic stroke within 3 months   [] []   Severe head trauma within 3 months    [] []   Intracranial or intraspinal surgery within 3 months   [] []   Current GI malignancy   [] []   Intracranial neoplasm   [] []   Infective endocarditis   [] []   Aortic arch dissection   [] []   Active coagulopathy with  INR >1.7, platelets <100,000, PTT > 40 sec, PT > 15 sec   *For warfarin, administration can begin before blood tests resulted. Discontinue for above values.    [] []   Treatment dose* of LMWH (Lovenox) in last 24 hours  *prophylactic dosages are not a contraindication   [] []   Concurrent use of antiplatelet agents' glycoprotein inhibitors IIb/IIIa (Integrilin, etc.)   [] []   Thrombin or factor Xa inhibitors (Eliquis, Xarelto, Arixtra) taken in last 48 hours     YES NO CLASS II: AIS WITH THE FOLLOWING CONDITIONS -   TREATMENT RISKS SHOULD BE WEIGHED AGAINST POSSIBLE BENEFITS.    [] []   Major trauma in last 14 days, recent major surgery in last 14 days, intracranial arterial dissection, giant unruptured and unsecured intracranial aneurysm, pericarditis      [] []   The risks and benefits have been discussed with the patient or family related to the administration of IV thrombolytic therapy for stroke symptoms.   [] []   I have discussed and reviewed the patient's case and imaging with the attending prior to IV thrombolytic therapy.   TIME N/A Time IV thrombolytic administered       Hospital Meds:  Scheduled-    Infusions-     PRNs-   sodium chloride    Functional Status Prior to Current Stroke/Jim Score: 0    NIH Stroke Scale  Time: 15:26 EDT  Person Administering Scale: YVONNE Avendaño    Interval: baseline  1a. Level of Consciousness: 0-->Alert, keenly responsive  1b. LOC Questions: 0-->Answers both questions correctly  1c. LOC Commands: 0-->Performs both tasks correctly  2. Best Gaze: 0-->Normal  3. Visual: 0-->No visual loss  4. Facial Palsy: 0-->Normal symmetrical movements  5a. Motor Arm, Left: 0-->No drift, limb holds 90 (or 45) degrees for full 10 secs  5b. Motor Arm, Right: 0-->No drift, limb holds 90 (or 45) degrees for full 10 secs  6a. Motor Leg, Left: 0-->No drift, leg holds 30 degree position for full 5 secs  6b. Motor Leg, Right: 0-->No drift, leg holds 30 degree position for full 5 secs  7. Limb Ataxia: 0-->Absent  8. Sensory: 0-->Normal, no sensory loss  9. Best Language: 0-->No aphasia, normal  10. Dysarthria: 0-->Normal  11. Extinction and Inattention (formerly Neglect): 0-->No abnormality    Total (NIH Stroke Scale): 0        Results Reviewed:  I have personally reviewed current lab, radiology, and data and agree with results.    CT head without contrast is negative for hemorrhage and/or acute process.    CTA head/neck reveals multifocal intracranial atherosclerotic disease however no evidence of flow-limiting stenosis or LVO.    CTP is negative for LVO.    WBC   Date Value Ref Range Status   06/25/2025 9.17 3.40 - 10.80 10*3/mm3 Final     Hemoglobin   Date Value Ref Range Status   06/25/2025 13.9 12.0 - 17.0 g/dL Final      Comment:     Serial Number: [id number]:  [id number]   06/25/2025 13.2 12.0 - 15.9 g/dL Final     Hematocrit   Date Value Ref Range Status   06/25/2025 41 38 - 51 % Final   06/25/2025 40.8 34.0 - 46.6 % Final     Platelets   Date Value Ref Range Status   06/25/2025 233 140 - 450 10*3/mm3 Final       Assessment/Plan:    This is an 85-year-old female with known medical diagnoses of essential hypertension, hyperlipidemia, obesity, cecal neoplasm s/p laparoscopic right colectomy (2024), and right breast cancer who presents to the emergency department via EMS for further evaluation of recurrent transient episodes of left-sided weakness and numbness which she first noted approximately 1 hour prior to arrival.  Patient states that she has had 2-3 episodes where her left side becomes weak and numb for approximately 5 minutes before spontaneously resolving.  On arrival to our facility the patient is asymptomatic therefore she is not a candidate for IV thrombolytic therapy.  CTA head/neck/perfusion revealed reveals multifocal iCAD however no evidence of flow-limiting stenosis or LVO.  She will require admission to the hospital service for further workup.    Antiplatelet PTA: None   Anticoagulant PTA: None    Transient left-sided weakness and numbness, etiology right hemispheric TIA versus CVA  -TIA/CVA order set without thrombolytic therapy has been initiated  -NPO until bedside nursing dysphagia screen completed  -MRI brain without contrast  -TTE   -A1c and lipid panel in AM  -Meds:  mg and Plavix 300 mg now, continue ASA 81 mg and Plavix 75 mg daily thereafter  -Activity as tolerated, fall risk precautions  -PT/OT/SLP evaluation    2.  Essential hypertension  -Allow autoregulation of blood pressure for adequate cerebral blood flow  -Nicardipine as needed for SBP >220    3.  Hyperlipidemia  -Lipid panel in AM  -Atorvastatin 80mg nightly    Plan of care was discussed with patient and Dr. Hendrickson (emergency department  physician).  Stroke neurology will continue to follow.  Please call with any questions or concerns.  Thank you for this consult.         Martina Higginbotham, APRN  June 25, 2025  15:26 EDT

## 2025-06-25 NOTE — ED PROVIDER NOTES
EMERGENCY DEPARTMENT ENCOUNTER    Pt Name: Naila Landin  MRN: [id number]  Pt :   1940  Room Number:    Date of encounter:  2025  PCP: Tye Turcios MD  ED Provider: Gilmer Hendrickson MD    Historian: Patient paramedics      HPI:  Chief Complaint: Left-sided numbness and weakness which has been intermittent        Context: Naila Landin is a 85 y.o. female who presents to the ED c/o symptoms starting around an hour prior to arrival in the emergency department.  The patient has noted on and off tingling and numbness with some concerns for weakness in her left arm and left leg.  She has never had this previously.  No dysarthria or dysphagia.      PAST MEDICAL HISTORY  Past Medical History:   Diagnosis Date    Cecal neoplasm     Disease of thyroid gland     Diverticulitis     Hypertension     Urinary frequency          PAST SURGICAL HISTORY  Past Surgical History:   Procedure Laterality Date    ABDOMINAL HERNIA REPAIR      with mesh    APPENDECTOMY      CHOLECYSTECTOMY      COLON RESECTION Right 2024    Procedure: OPEN RIGHT COLECTOMY, LAPAROSCOPIC LYSIS OF ADHESIONS;  Surgeon: Caleb Hirsch MD;  Location: [facility];  Service: General;  Laterality: Right;    COLON SURGERY      resection due to diverticulitis    COLONOSCOPY      serveral    HYSTERECTOMY      KNEE ARTHROPLASTY Right     OTHER SURGICAL HISTORY      bladder stimulator    TONSILLECTOMY           FAMILY HISTORY  No family history on file.      SOCIAL HISTORY  Social History     Socioeconomic History    Marital status:    Tobacco Use    Smoking status: Never     Passive exposure: Past    Smokeless tobacco: Never   Vaping Use    Vaping status: Never Used   Substance and Sexual Activity    Alcohol use: Never    Drug use: Never    Sexual activity: Defer         ALLERGIES  Patient has no known allergies.        REVIEW OF SYSTEMS  Review of Systems       All systems reviewed and negative except for those  discussed in HPI.       PHYSICAL EXAM    I have reviewed the triage vital signs and nursing notes.    ED Triage Vitals   Temp Pulse Resp BP SpO2   -- -- -- -- --      Temp src Heart Rate Source Patient Position BP Location FiO2 (%)   -- -- -- -- --       Physical Exam  GENERAL:   Appears pleasant, nontoxic.  I evaluate her as she rolls through the door with paramedics and I accompany them to the CT scanner.  HENT: Nares patent.  No facial asymmetry  EYES: No scleral icterus.  CV: Regular rhythm, regular rate.  No murmurs gallops rubs  RESPIRATORY: Normal effort.  No audible wheezes, rales or rhonchi.  Clear to auscultation  ABDOMEN: Soft, nontender  MUSCULOSKELETAL: No deformities.   NEURO: Alert, oriented x 3 with clear speech, moves all extremities, follows commands.  See stroke navigator note for detailed NIH stroke score.  SKIN: Warm, dry, no rash visualized.      LAB RESULTS  Recent Results (from the past 24 hours)   Green Top (Gel)    Collection Time: 06/25/25  3:29 PM   Result Value Ref Range    Extra Tube Hold for add-ons.    Lavender Top    Collection Time: 06/25/25  3:29 PM   Result Value Ref Range    Extra Tube hold for add-on    Gold Top - SST    Collection Time: 06/25/25  3:29 PM   Result Value Ref Range    Extra Tube Hold for add-ons.    Gray Top    Collection Time: 06/25/25  3:29 PM   Result Value Ref Range    Extra Tube Hold for add-ons.    Light Blue Top    Collection Time: 06/25/25  3:29 PM   Result Value Ref Range    Extra Tube Hold for add-ons.    CBC Auto Differential    Collection Time: 06/25/25  3:29 PM    Specimen: Arm, Right; Blood   Result Value Ref Range    WBC 9.17 3.40 - 10.80 10*3/mm3    RBC 4.21 3.77 - 5.28 10*6/mm3    Hemoglobin 13.2 12.0 - 15.9 g/dL    Hematocrit 40.8 34.0 - 46.6 %    MCV 96.9 79.0 - 97.0 fL    MCH 31.4 26.6 - 33.0 pg    MCHC 32.4 31.5 - 35.7 g/dL    RDW 12.8 12.3 - 15.4 %    RDW-SD 45.8 37.0 - 54.0 fl    MPV 10.7 6.0 - 12.0 fL    Platelets 233 140 - 450 10*3/mm3     Neutrophil % 56.7 42.7 - 76.0 %    Lymphocyte % 32.6 19.6 - 45.3 %    Monocyte % 8.1 5.0 - 12.0 %    Eosinophil % 1.7 0.3 - 6.2 %    Basophil % 0.7 0.0 - 1.5 %    Immature Grans % 0.2 0.0 - 0.5 %    Neutrophils, Absolute 5.20 1.70 - 7.00 10*3/mm3    Lymphocytes, Absolute 2.99 0.70 - 3.10 10*3/mm3    Monocytes, Absolute 0.74 0.10 - 0.90 10*3/mm3    Eosinophils, Absolute 0.16 0.00 - 0.40 10*3/mm3    Basophils, Absolute 0.06 0.00 - 0.20 10*3/mm3    Immature Grans, Absolute 0.02 0.00 - 0.05 10*3/mm3    nRBC 0.0 0.0 - 0.2 /100 WBC   POC CHEM 8    Collection Time: 06/25/25  3:30 PM    Specimen: Blood   Result Value Ref Range    Glucose 136 (H) 70 - 130 mg/dL    BUN 19 8 - 26 mg/dL    Creatinine 1.00 0.60 - 1.30 mg/dL    Sodium 139 138 - 146 mmol/L    POC Potassium 3.8 3.5 - 4.9 mmol/L    Chloride 103 98 - 109 mmol/L    Total CO2 23 (L) 24 - 29 mmol/L    Hemoglobin 13.9 12.0 - 17.0 g/dL    Hematocrit 41 38 - 51 %    Ionized Calcium 1.22 1.20 - 1.32 mmol/L    eGFR 56.0 (L) >60.0 mL/min/1.73   ECG 12 Lead ED Triage Standing Order; Acute Stroke (Onset <24 hrs)    Collection Time: 06/25/25  4:04 PM   Result Value Ref Range    QT Interval 420 ms    QTC Interval 459 ms       If labs were ordered, I independently reviewed the results and considered them in treating the patient.        RADIOLOGY  CT Head Without Contrast Stroke Protocol  Result Date: 6/25/2025  CT ANGIOGRAM HEAD W AI ANALYSIS OF LVO, CT CEREBRAL PERFUSION W WO CONTRAST, CT ANGIOGRAM NECK, CT HEAD WO CONTRAST STROKE PROTOCOL Date of Exam: 6/25/2025 3:23 PM EDT Indication: Neuro Deficit, acute, Stroke suspected Neuro deficit, acute stroke suspected. Comparison: None available. Technique: Axial noncontrast CT of the head with multiplanar reconstruction. Axial CT images of the brain were obtained prior to and after the administration of 115 mL . CT Perfusion protocol was utilized. Automated post processing was performed by RAPID  software and submitted to PACS for  interpretation.  CTA of the head and neck were performed after the administration of iodinated contrast.  Reconstructed coronal and sagittal images were also obtained. A 3-D volume rendered image was created for interpretation. Automated exposure control and iterative reconstruction methods were used.  FINDINGS: CT HEAD: Gray-white differentiation is maintained and there is no evidence of intracranial hemorrhage, mass or mass effect. Age-related changes of the brain are present including volume loss and typical periventricular sequela of chronic small vessel ischemia. There is otherwise no evidence of intracranial hemorrhage, mass or mass effect. The ventricles are normal in size and configuration accounting for surrounding volume loss. The orbits are normal and the paranasal sinuses are grossly clear. CT PERFUSION: There is an area of prolonged Tmax seen within the left parietal lobe, left posterior MCA territory, without underlying core infarct, potentially representing some age-indeterminate hypoperfusion or ischemic tissue at risk. CT ANGIOGRAM: The lung apices are clear. Evaluation of the neck soft tissues demonstrates no evidence of aerodigestive tract mass or pathologic cervical lymphadenopathy. Multilevel cervical spondylosis is present, without evidence of acute fracture or aggressive osseous lesion. Patent aortic arch with shared origin of the brachiocephalic and left common carotid arteries. The subclavian arteries appear patent bilaterally, partially obscured on the right. The ICA origins are patent bilaterally with 0% narrowing present by NASCET criteria. The right and left vertebral arteries are patent bilaterally. Intracranially, the carotid siphons demonstrate calcific atherosclerotic changes with some mild narrowing of both ophthalmic segments. The anterior cerebral arteries are patent, with azygos system noted. The right middle cerebral artery is normal in course and caliber. There is some mild  multifocal atherosclerotic narrowing of the left M1 segment MCA which is otherwise patent. The vertebrobasilar system is patent. The right posterior cerebral artery is normal in course and caliber. There is mild to moderate narrowing of the left PCA near the P1-P2 junction.     Age-related changes of the brain as above, otherwise without evidence of acute intracranial abnormality. CT perfusion demonstrates some nonspecific prolonged Tmax within the left posterior MCA territory, suggesting somewhat age indeterminate hypoperfusion, with acute component/ischemic tissue at risk difficult to exclude. Multifocal cervical and intracranial atherosclerotic changes are present, without evidence of focal high-grade stenosis, large vessel occlusion or aneurysm. Electronically Signed: Derek Adkins MD  6/25/2025 4:01 PM EDT  Workstation ID: [id number]    CT Angiogram Head w AI Analysis of LVO  Result Date: 6/25/2025  CT ANGIOGRAM HEAD W AI ANALYSIS OF LVO, CT CEREBRAL PERFUSION W WO CONTRAST, CT ANGIOGRAM NECK, CT HEAD WO CONTRAST STROKE PROTOCOL Date of Exam: 6/25/2025 3:23 PM EDT Indication: Neuro Deficit, acute, Stroke suspected Neuro deficit, acute stroke suspected. Comparison: None available. Technique: Axial noncontrast CT of the head with multiplanar reconstruction. Axial CT images of the brain were obtained prior to and after the administration of 115 mL . CT Perfusion protocol was utilized. Automated post processing was performed by RAPID  software and submitted to PACS for interpretation.  CTA of the head and neck were performed after the administration of iodinated contrast.  Reconstructed coronal and sagittal images were also obtained. A 3-D volume rendered image was created for interpretation. Automated exposure control and iterative reconstruction methods were used.  FINDINGS: CT HEAD: Gray-white differentiation is maintained and there is no evidence of intracranial hemorrhage, mass or mass effect. Age-related  changes of the brain are present including volume loss and typical periventricular sequela of chronic small vessel ischemia. There is otherwise no evidence of intracranial hemorrhage, mass or mass effect. The ventricles are normal in size and configuration accounting for surrounding volume loss. The orbits are normal and the paranasal sinuses are grossly clear. CT PERFUSION: There is an area of prolonged Tmax seen within the left parietal lobe, left posterior MCA territory, without underlying core infarct, potentially representing some age-indeterminate hypoperfusion or ischemic tissue at risk. CT ANGIOGRAM: The lung apices are clear. Evaluation of the neck soft tissues demonstrates no evidence of aerodigestive tract mass or pathologic cervical lymphadenopathy. Multilevel cervical spondylosis is present, without evidence of acute fracture or aggressive osseous lesion. Patent aortic arch with shared origin of the brachiocephalic and left common carotid arteries. The subclavian arteries appear patent bilaterally, partially obscured on the right. The ICA origins are patent bilaterally with 0% narrowing present by NASCET criteria. The right and left vertebral arteries are patent bilaterally. Intracranially, the carotid siphons demonstrate calcific atherosclerotic changes with some mild narrowing of both ophthalmic segments. The anterior cerebral arteries are patent, with azygos system noted. The right middle cerebral artery is normal in course and caliber. There is some mild multifocal atherosclerotic narrowing of the left M1 segment MCA which is otherwise patent. The vertebrobasilar system is patent. The right posterior cerebral artery is normal in course and caliber. There is mild to moderate narrowing of the left PCA near the P1-P2 junction.     Age-related changes of the brain as above, otherwise without evidence of acute intracranial abnormality. CT perfusion demonstrates some nonspecific prolonged Tmax within the  left posterior MCA territory, suggesting somewhat age indeterminate hypoperfusion, with acute component/ischemic tissue at risk difficult to exclude. Multifocal cervical and intracranial atherosclerotic changes are present, without evidence of focal high-grade stenosis, large vessel occlusion or aneurysm. Electronically Signed: Derek Adkins MD  6/25/2025 4:01 PM EDT  Workstation ID: [id number]    CT Angiogram Neck  Result Date: 6/25/2025  CT ANGIOGRAM HEAD W AI ANALYSIS OF LVO, CT CEREBRAL PERFUSION W WO CONTRAST, CT ANGIOGRAM NECK, CT HEAD WO CONTRAST STROKE PROTOCOL Date of Exam: 6/25/2025 3:23 PM EDT Indication: Neuro Deficit, acute, Stroke suspected Neuro deficit, acute stroke suspected. Comparison: None available. Technique: Axial noncontrast CT of the head with multiplanar reconstruction. Axial CT images of the brain were obtained prior to and after the administration of 115 mL . CT Perfusion protocol was utilized. Automated post processing was performed by RAPID  software and submitted to PACS for interpretation.  CTA of the head and neck were performed after the administration of iodinated contrast.  Reconstructed coronal and sagittal images were also obtained. A 3-D volume rendered image was created for interpretation. Automated exposure control and iterative reconstruction methods were used.  FINDINGS: CT HEAD: Gray-white differentiation is maintained and there is no evidence of intracranial hemorrhage, mass or mass effect. Age-related changes of the brain are present including volume loss and typical periventricular sequela of chronic small vessel ischemia. There is otherwise no evidence of intracranial hemorrhage, mass or mass effect. The ventricles are normal in size and configuration accounting for surrounding volume loss. The orbits are normal and the paranasal sinuses are grossly clear. CT PERFUSION: There is an area of prolonged Tmax seen within the left parietal lobe, left posterior MCA territory,  without underlying core infarct, potentially representing some age-indeterminate hypoperfusion or ischemic tissue at risk. CT ANGIOGRAM: The lung apices are clear. Evaluation of the neck soft tissues demonstrates no evidence of aerodigestive tract mass or pathologic cervical lymphadenopathy. Multilevel cervical spondylosis is present, without evidence of acute fracture or aggressive osseous lesion. Patent aortic arch with shared origin of the brachiocephalic and left common carotid arteries. The subclavian arteries appear patent bilaterally, partially obscured on the right. The ICA origins are patent bilaterally with 0% narrowing present by NASCET criteria. The right and left vertebral arteries are patent bilaterally. Intracranially, the carotid siphons demonstrate calcific atherosclerotic changes with some mild narrowing of both ophthalmic segments. The anterior cerebral arteries are patent, with azygos system noted. The right middle cerebral artery is normal in course and caliber. There is some mild multifocal atherosclerotic narrowing of the left M1 segment MCA which is otherwise patent. The vertebrobasilar system is patent. The right posterior cerebral artery is normal in course and caliber. There is mild to moderate narrowing of the left PCA near the P1-P2 junction.     Age-related changes of the brain as above, otherwise without evidence of acute intracranial abnormality. CT perfusion demonstrates some nonspecific prolonged Tmax within the left posterior MCA territory, suggesting somewhat age indeterminate hypoperfusion, with acute component/ischemic tissue at risk difficult to exclude. Multifocal cervical and intracranial atherosclerotic changes are present, without evidence of focal high-grade stenosis, large vessel occlusion or aneurysm. Electronically Signed: Derek Adkins MD  6/25/2025 4:01 PM EDT  Workstation ID: [id number]    CT CEREBRAL PERFUSION WITH & WITHOUT CONTRAST  Result Date: 6/25/2025  CT  ANGIOGRAM HEAD W AI ANALYSIS OF LVO, CT CEREBRAL PERFUSION W WO CONTRAST, CT ANGIOGRAM NECK, CT HEAD WO CONTRAST STROKE PROTOCOL Date of Exam: 6/25/2025 3:23 PM EDT Indication: Neuro Deficit, acute, Stroke suspected Neuro deficit, acute stroke suspected. Comparison: None available. Technique: Axial noncontrast CT of the head with multiplanar reconstruction. Axial CT images of the brain were obtained prior to and after the administration of 115 mL . CT Perfusion protocol was utilized. Automated post processing was performed by RAPID  software and submitted to PACS for interpretation.  CTA of the head and neck were performed after the administration of iodinated contrast.  Reconstructed coronal and sagittal images were also obtained. A 3-D volume rendered image was created for interpretation. Automated exposure control and iterative reconstruction methods were used.  FINDINGS: CT HEAD: Gray-white differentiation is maintained and there is no evidence of intracranial hemorrhage, mass or mass effect. Age-related changes of the brain are present including volume loss and typical periventricular sequela of chronic small vessel ischemia. There is otherwise no evidence of intracranial hemorrhage, mass or mass effect. The ventricles are normal in size and configuration accounting for surrounding volume loss. The orbits are normal and the paranasal sinuses are grossly clear. CT PERFUSION: There is an area of prolonged Tmax seen within the left parietal lobe, left posterior MCA territory, without underlying core infarct, potentially representing some age-indeterminate hypoperfusion or ischemic tissue at risk. CT ANGIOGRAM: The lung apices are clear. Evaluation of the neck soft tissues demonstrates no evidence of aerodigestive tract mass or pathologic cervical lymphadenopathy. Multilevel cervical spondylosis is present, without evidence of acute fracture or aggressive osseous lesion. Patent aortic arch with shared origin of the  brachiocephalic and left common carotid arteries. The subclavian arteries appear patent bilaterally, partially obscured on the right. The ICA origins are patent bilaterally with 0% narrowing present by NASCET criteria. The right and left vertebral arteries are patent bilaterally. Intracranially, the carotid siphons demonstrate calcific atherosclerotic changes with some mild narrowing of both ophthalmic segments. The anterior cerebral arteries are patent, with azygos system noted. The right middle cerebral artery is normal in course and caliber. There is some mild multifocal atherosclerotic narrowing of the left M1 segment MCA which is otherwise patent. The vertebrobasilar system is patent. The right posterior cerebral artery is normal in course and caliber. There is mild to moderate narrowing of the left PCA near the P1-P2 junction.     Age-related changes of the brain as above, otherwise without evidence of acute intracranial abnormality. CT perfusion demonstrates some nonspecific prolonged Tmax within the left posterior MCA territory, suggesting somewhat age indeterminate hypoperfusion, with acute component/ischemic tissue at risk difficult to exclude. Multifocal cervical and intracranial atherosclerotic changes are present, without evidence of focal high-grade stenosis, large vessel occlusion or aneurysm. Electronically Signed: Derek Adkins MD  6/25/2025 4:01 PM EDT  Workstation ID: [id number]      I ordered and independently reviewed the above noted radiographic studies.      I viewed images of CT head which showed no intracranial hemorrhage per my independent interpretation.    See radiologist's dictation for official interpretation.        PROCEDURES    Critical Care    Performed by: Gilmer Hendrickson MD  Authorized by: Gilmer Hendrickson MD    Critical care provider statement:     Critical care time (minutes):  30    Critical care time was exclusive of:  Separately billable procedures and treating other  patients    Critical care was necessary to treat or prevent imminent or life-threatening deterioration of the following conditions:  CNS failure or compromise    Critical care was time spent personally by me on the following activities:  Ordering and performing treatments and interventions, ordering and review of laboratory studies, ordering and review of radiographic studies, pulse oximetry, re-evaluation of patient's condition, review of old charts, obtaining history from patient or surrogate, examination of patient, evaluation of patient's response to treatment, discussions with consultants and development of treatment plan with patient or surrogate      ECG 12 Lead ED Triage Standing Order; Acute Stroke (Onset <24 hrs)   Preliminary Result   Test Reason : ED Triage Standing Order~   Blood Pressure :   */*   mmHG   Vent. Rate :  72 BPM     Atrial Rate :  72 BPM      P-R Int : 152 ms          QRS Dur :  86 ms       QT Int : 420 ms       P-R-T Axes :  50  58  66 degrees     QTcB Int : 459 ms      Normal sinus rhythm   Normal ECG   When compared with ECG of 18-Jul-2024 14:39,   No significant change was found      Referred By: ED MD           Confirmed By:           MEDICATIONS GIVEN IN ER    Medications   sodium chloride 0.9 % flush 10 mL (has no administration in time range)   clopidogrel (PLAVIX) tablet 300 mg (has no administration in time range)     And   clopidogrel (PLAVIX) tablet 75 mg (has no administration in time range)   aspirin tablet 325 mg (has no administration in time range)   iopamidol (ISOVUE-370) 76 % injection 115 mL (115 mL Intravenous Given 6/25/25 1533)         MEDICAL DECISION MAKING, PROGRESS, and CONSULTS    All labs, if obtained, have been independently reviewed by me.  All radiology studies, if obtained, have been reviewed by me and the radiologist dictating the report.  All EKGs, if obtained, have been independently viewed and interpreted by me/my attending physician.      Discussion  below represents my analysis of pertinent findings related to patient's condition, differential diagnosis, treatment plan and final disposition.                            Total (NIH Stroke Scale): 0             Differential diagnosis:    TIA versus CVA versus seizure, etc.      Additional sources:    - Discussed/ obtained information from independent historians: I spoke with paramedics upon arrival and took report directly from them.    - External (non-ED) record review: Reviewed multiple records including discharge summary dated 8/12/2024 after the patient had been admitted for right colectomy status postdiagnosis with cecal neoplasm.    I reviewed MRI lumbar spine dated 1/7/2016 when the patient had superior compression deformity of L1.    - Chronic or social conditions impacting care: History of cancer        Orders placed during this visit:  Orders Placed This Encounter   Procedures    Critical Care    CT Head Without Contrast Stroke Protocol    CT Angiogram Head w AI Analysis of LVO    CT Angiogram Neck    CT CEREBRAL PERFUSION WITH & WITHOUT CONTRAST    XR Chest 1 View    Molt Draw    Protime-INR    aPTT    CBC Auto Differential    Comprehensive Metabolic Panel    NPO Diet NPO Type: Strict NPO    Initiate Code Stroke    Perform NIH Stroke Scale    Measure Actual Weight    Head of Bed 30 Degrees or Less    Undress and Gown    Continuous Pulse Oximetry    Vital Signs    Neuro Checks    Notify Provider SBP <80 or >200    Notify Provider for SBP > 140 if Hemorrhagic Stroke    No Hypotonic Fluids    Nursing Dysphagia Screening (Complete Prior to Giving anything PO)    RN to Place Order SLP Consult (IF swallow screen failed) - Eval & Treat Choosing Reason of RN Dysphagia Screen Failed    Notify Provider    Nursing Dysphagia Screening (Complete Prior to Giving Anything By Mouth)    RN to Place Order SLP Consult - Eval & Treat Choosing Reason of RN Dysphagia Screen Failed    Nurse to Call MD or Nutrition Services  for Diet if Patient Passes Dysphagia Screen    Inpatient Neurology Consult Stroke    Oxygen Therapy- Nasal Cannula; Titrate 1-6 LPM Per SpO2; 90 - 95%    SLP Consult: Eval & Treat Communication Disorder    POC CHEM 8    POC CHEM 8    ECG 12 Lead ED Triage Standing Order; Acute Stroke (Onset <24 hrs)    Insert Large-Bore Peripheral IV - Right AC Preferred    CBC & Differential    Green Top (Gel)    Lavender Top    Gold Top - SST    Gray Top    Light Blue Top         Additional orders considered but not ordered:  MRI brain which can be ordered by neurology team    ED Course:    Consultants:      ED Course as of 06/25/25 1606   Wed Jun 25, 2025   1604 I have been coordinating care with our stroke neurology team.  They will start the patient on Plavix and aspirin.  Admit to hospitalist. [MS]   1604 Currently no indication for IV TNKase nor Cath Lab intervention. [MS]   1606 Contacted Dr. Higginbotham, hospitalist for admission [MS]      ED Course User Index  [MS] Gilmer Hendrickson MD              Shared Decision Making:  After my consideration of clinical presentation and any laboratory/radiology studies obtained, I discussed the findings with the patient/patient representative who is in agreement with the treatment plan and the final disposition.   Risks and benefits of discharge and/or observation/admission were discussed.       AS OF 16:06 EDT VITALS:    BP -    HR -    TEMP -    O2 SATS -                    DIAGNOSIS  Final diagnoses:   Acute left-sided weakness   Intermittent paresthesia of left hand and foot         DISPOSITION  Admission      Please note that portions of this document were completed with voice recognition software.        Gilmer Hendrickson MD  06/25/25 [id number]

## 2025-06-25 NOTE — H&P
[facility] Medicine Services  HISTORY AND PHYSICAL    Patient Name: Naila Landin  : 1940  MRN: [id number]  Primary Care Physician: Tye Turcios MD  Date of admission: 2025      Subjective   Subjective     Chief Complaint:  TIA    HPI:  Naila Landin is a 85 y.o. female with known medical diagnoses of essential hypertension, hyperlipidemia, obesity, cecal neoplasm s/p laparoscopic right colectomy (), and right breast cancer who presents to the emergency department via EMS for further evaluation of recurrent transient episodes of left-sided weakness and numbness which she first noted approximately 1 hour prior to arrival. Stroke team evaluated patient in the ED. Patient had resolution of symptoms.     Patient seen in the ED. Patient endorses above history. Her  is present at bedside and permission obtained to discuss patient's medical condition/status. Denies N/V/F/C.     Personal History     Past Medical History:   Diagnosis Date    Cecal neoplasm     Disease of thyroid gland     Diverticulitis     Hypertension     Urinary frequency            Past Surgical History:   Procedure Laterality Date    ABDOMINAL HERNIA REPAIR      with mesh    APPENDECTOMY      CHOLECYSTECTOMY      COLON RESECTION Right 2024    Procedure: OPEN RIGHT COLECTOMY, LAPAROSCOPIC LYSIS OF ADHESIONS;  Surgeon: Caleb Hirsch MD;  Location: [facility];  Service: General;  Laterality: Right;    COLON SURGERY  2012    resection due to diverticulitis    COLONOSCOPY      serveral    HYSTERECTOMY      KNEE ARTHROPLASTY Right     OTHER SURGICAL HISTORY      bladder stimulator    TONSILLECTOMY         Family History: family history is not on file.     Social History:  reports that she has never smoked. She has been exposed to tobacco smoke. She has never used smokeless tobacco. She reports that she does not drink alcohol and does not use drugs.  Social History     Social  History Narrative    Not on file       Medications:  Available home medication information reviewed.  alendronate, amLODIPine, fluticasone, latanoprost, levothyroxine, metoprolol succinate XL, and multivitamin with minerals    No Known Allergies    Objective   Objective     Vital Signs:   Temp:  [98.3 °F (36.8 °C)] 98.3 °F (36.8 °C)  Heart Rate:  [68-75] 71  Resp:  [16-18] 16  BP: (135-175)/(69-86) 158/86  Total (NIH Stroke Scale): 0    Physical Exam   Constitutional:  female no acute distress, awake, alert  HENT: NCAT, mucous membranes moist  Respiratory: Clear to auscultation bilaterally, respiratory effort normal   Cardiovascular: RRR, no murmurs, rubs, or gallops  Gastrointestinal: Positive bowel sounds, soft, nontender, nondistended  Musculoskeletal: No bilateral ankle edema  Psychiatric: Appropriate affect, cooperative  Neurologic: Oriented x 3, strength symmetric in all extremities, speech clear  Skin: No rashes    Result Review:  I have personally reviewed the results from the time of this admission to 6/25/2025 18:56 EDT and agree with these findings:  [x]  Laboratory list / accordion  []  Microbiology  [x]  Radiology  []  EKG/Telemetry   []  Cardiology/Vascular   []  Pathology  []  Old records  []  Other:        LAB RESULTS:      Lab 06/25/25  1530 06/25/25  1529   WBC  --  9.17   HEMOGLOBIN  --  13.2   HEMOGLOBIN, POC 13.9  --    HEMATOCRIT  --  40.8   HEMATOCRIT POC 41  --    PLATELETS  --  233   NEUTROS ABS  --  5.20   IMMATURE GRANS (ABS)  --  0.02   LYMPHS ABS  --  2.99   MONOS ABS  --  0.74   EOS ABS  --  0.16   MCV  --  96.9   PROTIME  --  14.9   INR  --  1.10   APTT  --  30.7         Lab 06/25/25  1530 06/25/25  1529   SODIUM  --  138   POTASSIUM  --  3.9   CHLORIDE  --  102   CO2  --  25.0   ANION GAP  --  11.0   BUN  --  17.3   CREATININE 1.00 0.81   EGFR 56.0* 72.1   GLUCOSE  --  129*   CALCIUM  --  8.7         Lab 06/25/25  1529   TOTAL PROTEIN 6.6   ALBUMIN 3.8   GLOBULIN 2.8   ALT  (SGPT) 47*   AST (SGOT) 75*   BILIRUBIN 0.4   ALK PHOS 86                         Microbiology Results (last 10 days)       ** No results found for the last 240 hours. **            XR Chest 1 View  Result Date: 6/25/2025  XR CHEST 1 VW Date of Exam: 6/25/2025 3:45 PM EDT Indication: Acute Stroke Protocol (onset < 12 hrs) Comparison: Chest radiograph 11/5/2012. Findings: Patient is rotated. Within these confines, cardiomediastinal silhouette is within normal limits. Probable benign calcified granuloma in the left lung. No focal consolidation. No pleural effusion or pneumothorax. Osseous structures are unremarkable.     Impression: Impression: No acute cardiopulmonary findings. Electronically Signed: Leonard Baxter MD  6/25/2025 4:08 PM EDT  Workstation ID: [id number]    CT Head Without Contrast Stroke Protocol  Result Date: 6/25/2025  CT ANGIOGRAM HEAD W AI ANALYSIS OF LVO, CT CEREBRAL PERFUSION W WO CONTRAST, CT ANGIOGRAM NECK, CT HEAD WO CONTRAST STROKE PROTOCOL Date of Exam: 6/25/2025 3:23 PM EDT Indication: Neuro Deficit, acute, Stroke suspected Neuro deficit, acute stroke suspected. Comparison: None available. Technique: Axial noncontrast CT of the head with multiplanar reconstruction. Axial CT images of the brain were obtained prior to and after the administration of 115 mL . CT Perfusion protocol was utilized. Automated post processing was performed by RAPID  software and submitted to PACS for interpretation.  CTA of the head and neck were performed after the administration of iodinated contrast.  Reconstructed coronal and sagittal images were also obtained. A 3-D volume rendered image was created for interpretation. Automated exposure control and iterative reconstruction methods were used.  FINDINGS: CT HEAD: Gray-white differentiation is maintained and there is no evidence of intracranial hemorrhage, mass or mass effect. Age-related changes of the brain are present including volume loss and typical  periventricular sequela of chronic small vessel ischemia. There is otherwise no evidence of intracranial hemorrhage, mass or mass effect. The ventricles are normal in size and configuration accounting for surrounding volume loss. The orbits are normal and the paranasal sinuses are grossly clear. CT PERFUSION: There is an area of prolonged Tmax seen within the left parietal lobe, left posterior MCA territory, without underlying core infarct, potentially representing some age-indeterminate hypoperfusion or ischemic tissue at risk. CT ANGIOGRAM: The lung apices are clear. Evaluation of the neck soft tissues demonstrates no evidence of aerodigestive tract mass or pathologic cervical lymphadenopathy. Multilevel cervical spondylosis is present, without evidence of acute fracture or aggressive osseous lesion. Patent aortic arch with shared origin of the brachiocephalic and left common carotid arteries. The subclavian arteries appear patent bilaterally, partially obscured on the right. The ICA origins are patent bilaterally with 0% narrowing present by NASCET criteria. The right and left vertebral arteries are patent bilaterally. Intracranially, the carotid siphons demonstrate calcific atherosclerotic changes with some mild narrowing of both ophthalmic segments. The anterior cerebral arteries are patent, with azygos system noted. The right middle cerebral artery is normal in course and caliber. There is some mild multifocal atherosclerotic narrowing of the left M1 segment MCA which is otherwise patent. The vertebrobasilar system is patent. The right posterior cerebral artery is normal in course and caliber. There is mild to moderate narrowing of the left PCA near the P1-P2 junction.     Impression: Age-related changes of the brain as above, otherwise without evidence of acute intracranial abnormality. CT perfusion demonstrates some nonspecific prolonged Tmax within the left posterior MCA territory, suggesting somewhat age  indeterminate hypoperfusion, with acute component/ischemic tissue at risk difficult to exclude. Multifocal cervical and intracranial atherosclerotic changes are present, without evidence of focal high-grade stenosis, large vessel occlusion or aneurysm. Electronically Signed: Derek Adkins MD  6/25/2025 4:01 PM EDT  Workstation ID: [id number]    CT Angiogram Head w AI Analysis of LVO  Result Date: 6/25/2025  CT ANGIOGRAM HEAD W AI ANALYSIS OF LVO, CT CEREBRAL PERFUSION W WO CONTRAST, CT ANGIOGRAM NECK, CT HEAD WO CONTRAST STROKE PROTOCOL Date of Exam: 6/25/2025 3:23 PM EDT Indication: Neuro Deficit, acute, Stroke suspected Neuro deficit, acute stroke suspected. Comparison: None available. Technique: Axial noncontrast CT of the head with multiplanar reconstruction. Axial CT images of the brain were obtained prior to and after the administration of 115 mL . CT Perfusion protocol was utilized. Automated post processing was performed by RAPID  software and submitted to PACS for interpretation.  CTA of the head and neck were performed after the administration of iodinated contrast.  Reconstructed coronal and sagittal images were also obtained. A 3-D volume rendered image was created for interpretation. Automated exposure control and iterative reconstruction methods were used.  FINDINGS: CT HEAD: Gray-white differentiation is maintained and there is no evidence of intracranial hemorrhage, mass or mass effect. Age-related changes of the brain are present including volume loss and typical periventricular sequela of chronic small vessel ischemia. There is otherwise no evidence of intracranial hemorrhage, mass or mass effect. The ventricles are normal in size and configuration accounting for surrounding volume loss. The orbits are normal and the paranasal sinuses are grossly clear. CT PERFUSION: There is an area of prolonged Tmax seen within the left parietal lobe, left posterior MCA territory, without underlying core infarct,  potentially representing some age-indeterminate hypoperfusion or ischemic tissue at risk. CT ANGIOGRAM: The lung apices are clear. Evaluation of the neck soft tissues demonstrates no evidence of aerodigestive tract mass or pathologic cervical lymphadenopathy. Multilevel cervical spondylosis is present, without evidence of acute fracture or aggressive osseous lesion. Patent aortic arch with shared origin of the brachiocephalic and left common carotid arteries. The subclavian arteries appear patent bilaterally, partially obscured on the right. The ICA origins are patent bilaterally with 0% narrowing present by NASCET criteria. The right and left vertebral arteries are patent bilaterally. Intracranially, the carotid siphons demonstrate calcific atherosclerotic changes with some mild narrowing of both ophthalmic segments. The anterior cerebral arteries are patent, with azygos system noted. The right middle cerebral artery is normal in course and caliber. There is some mild multifocal atherosclerotic narrowing of the left M1 segment MCA which is otherwise patent. The vertebrobasilar system is patent. The right posterior cerebral artery is normal in course and caliber. There is mild to moderate narrowing of the left PCA near the P1-P2 junction.     Impression: Age-related changes of the brain as above, otherwise without evidence of acute intracranial abnormality. CT perfusion demonstrates some nonspecific prolonged Tmax within the left posterior MCA territory, suggesting somewhat age indeterminate hypoperfusion, with acute component/ischemic tissue at risk difficult to exclude. Multifocal cervical and intracranial atherosclerotic changes are present, without evidence of focal high-grade stenosis, large vessel occlusion or aneurysm. Electronically Signed: Derek Adkins MD  6/25/2025 4:01 PM EDT  Workstation ID: [id number]    CT Angiogram Neck  Result Date: 6/25/2025  CT ANGIOGRAM HEAD W AI ANALYSIS OF LVO, CT CEREBRAL  PERFUSION W WO CONTRAST, CT ANGIOGRAM NECK, CT HEAD WO CONTRAST STROKE PROTOCOL Date of Exam: 6/25/2025 3:23 PM EDT Indication: Neuro Deficit, acute, Stroke suspected Neuro deficit, acute stroke suspected. Comparison: None available. Technique: Axial noncontrast CT of the head with multiplanar reconstruction. Axial CT images of the brain were obtained prior to and after the administration of 115 mL . CT Perfusion protocol was utilized. Automated post processing was performed by RAPID  software and submitted to PACS for interpretation.  CTA of the head and neck were performed after the administration of iodinated contrast.  Reconstructed coronal and sagittal images were also obtained. A 3-D volume rendered image was created for interpretation. Automated exposure control and iterative reconstruction methods were used.  FINDINGS: CT HEAD: Gray-white differentiation is maintained and there is no evidence of intracranial hemorrhage, mass or mass effect. Age-related changes of the brain are present including volume loss and typical periventricular sequela of chronic small vessel ischemia. There is otherwise no evidence of intracranial hemorrhage, mass or mass effect. The ventricles are normal in size and configuration accounting for surrounding volume loss. The orbits are normal and the paranasal sinuses are grossly clear. CT PERFUSION: There is an area of prolonged Tmax seen within the left parietal lobe, left posterior MCA territory, without underlying core infarct, potentially representing some age-indeterminate hypoperfusion or ischemic tissue at risk. CT ANGIOGRAM: The lung apices are clear. Evaluation of the neck soft tissues demonstrates no evidence of aerodigestive tract mass or pathologic cervical lymphadenopathy. Multilevel cervical spondylosis is present, without evidence of acute fracture or aggressive osseous lesion. Patent aortic arch with shared origin of the brachiocephalic and left common carotid  arteries. The subclavian arteries appear patent bilaterally, partially obscured on the right. The ICA origins are patent bilaterally with 0% narrowing present by NASCET criteria. The right and left vertebral arteries are patent bilaterally. Intracranially, the carotid siphons demonstrate calcific atherosclerotic changes with some mild narrowing of both ophthalmic segments. The anterior cerebral arteries are patent, with azygos system noted. The right middle cerebral artery is normal in course and caliber. There is some mild multifocal atherosclerotic narrowing of the left M1 segment MCA which is otherwise patent. The vertebrobasilar system is patent. The right posterior cerebral artery is normal in course and caliber. There is mild to moderate narrowing of the left PCA near the P1-P2 junction.     Impression: Age-related changes of the brain as above, otherwise without evidence of acute intracranial abnormality. CT perfusion demonstrates some nonspecific prolonged Tmax within the left posterior MCA territory, suggesting somewhat age indeterminate hypoperfusion, with acute component/ischemic tissue at risk difficult to exclude. Multifocal cervical and intracranial atherosclerotic changes are present, without evidence of focal high-grade stenosis, large vessel occlusion or aneurysm. Electronically Signed: Derek Adkins MD  6/25/2025 4:01 PM EDT  Workstation ID: [id number]    CT CEREBRAL PERFUSION WITH & WITHOUT CONTRAST  Result Date: 6/25/2025  CT ANGIOGRAM HEAD W AI ANALYSIS OF LVO, CT CEREBRAL PERFUSION W WO CONTRAST, CT ANGIOGRAM NECK, CT HEAD WO CONTRAST STROKE PROTOCOL Date of Exam: 6/25/2025 3:23 PM EDT Indication: Neuro Deficit, acute, Stroke suspected Neuro deficit, acute stroke suspected. Comparison: None available. Technique: Axial noncontrast CT of the head with multiplanar reconstruction. Axial CT images of the brain were obtained prior to and after the administration of 115 mL . CT Perfusion protocol was  utilized. Automated post processing was performed by RAPID  software and submitted to PACS for interpretation.  CTA of the head and neck were performed after the administration of iodinated contrast.  Reconstructed coronal and sagittal images were also obtained. A 3-D volume rendered image was created for interpretation. Automated exposure control and iterative reconstruction methods were used.  FINDINGS: CT HEAD: Gray-white differentiation is maintained and there is no evidence of intracranial hemorrhage, mass or mass effect. Age-related changes of the brain are present including volume loss and typical periventricular sequela of chronic small vessel ischemia. There is otherwise no evidence of intracranial hemorrhage, mass or mass effect. The ventricles are normal in size and configuration accounting for surrounding volume loss. The orbits are normal and the paranasal sinuses are grossly clear. CT PERFUSION: There is an area of prolonged Tmax seen within the left parietal lobe, left posterior MCA territory, without underlying core infarct, potentially representing some age-indeterminate hypoperfusion or ischemic tissue at risk. CT ANGIOGRAM: The lung apices are clear. Evaluation of the neck soft tissues demonstrates no evidence of aerodigestive tract mass or pathologic cervical lymphadenopathy. Multilevel cervical spondylosis is present, without evidence of acute fracture or aggressive osseous lesion. Patent aortic arch with shared origin of the brachiocephalic and left common carotid arteries. The subclavian arteries appear patent bilaterally, partially obscured on the right. The ICA origins are patent bilaterally with 0% narrowing present by NASCET criteria. The right and left vertebral arteries are patent bilaterally. Intracranially, the carotid siphons demonstrate calcific atherosclerotic changes with some mild narrowing of both ophthalmic segments. The anterior cerebral arteries are patent, with azygos system  noted. The right middle cerebral artery is normal in course and caliber. There is some mild multifocal atherosclerotic narrowing of the left M1 segment MCA which is otherwise patent. The vertebrobasilar system is patent. The right posterior cerebral artery is normal in course and caliber. There is mild to moderate narrowing of the left PCA near the P1-P2 junction.     Impression: Age-related changes of the brain as above, otherwise without evidence of acute intracranial abnormality. CT perfusion demonstrates some nonspecific prolonged Tmax within the left posterior MCA territory, suggesting somewhat age indeterminate hypoperfusion, with acute component/ischemic tissue at risk difficult to exclude. Multifocal cervical and intracranial atherosclerotic changes are present, without evidence of focal high-grade stenosis, large vessel occlusion or aneurysm. Electronically Signed: Derek Adkins MD  6/25/2025 4:01 PM EDT  Workstation ID: [id number]          Assessment & Plan   Assessment & Plan     85-year-old female with known medical diagnoses of essential hypertension, hyperlipidemia, obesity, cecal neoplasm s/p laparoscopic right colectomy (2024), and right breast cancer who presents to the emergency department via EMS for further evaluation of recurrent transient episodes of left-sided weakness and numbness which she first noted approximately 1 hour prior to arrival.  Patient states that she has had 2-3 episodes where her left side becomes weak and numb for approximately 5 minutes before spontaneously resolving.  On arrival to our facility the patient is asymptomatic therefore she is not a candidate for IV thrombolytic therapy.  CTA head/neck/perfusion revealed reveals multifocal iCAD however no evidence of flow-limiting stenosis or LVO.         Transient left-sided weakness and numbness, etiology right hemispheric TIA versus CVA  -TIA/CVA order set without thrombolytic therapy has been initiated  -NPO until bedside  nursing dysphagia screen completed  -MRI brain without contrast  -TTE   -A1c and lipid panel in AM  -Meds:  mg and Plavix 300 mg now, continue ASA 81 mg and Plavix 75 mg daily thereafter  -Activity as tolerated, fall risk precautions  -PT/OT/SLP evaluation     Essential hypertension  -Allow autoregulation of blood pressure for adequate cerebral blood flow  -Nicardipine as needed for SBP >220    Elevated LFTs  -Abdominal exam benign. Monitor   -Spoke with stroke team, will adjust Atorvastatin to 40 mg QHS     Hyperlipidemia  -Lipid panel in AM      VTE Prophylaxis:  Mechanical VTE prophylaxis orders are present.          CODE STATUS:    Code Status and Medical Interventions: CPR (Attempt to Resuscitate); Full Support   Ordered at: 06/25/25 1726     Code Status (Patient has no pulse and is not breathing):    CPR (Attempt to Resuscitate)     Medical Interventions (Patient has pulse or is breathing):    Full Support     Level Of Support Discussed With:    Patient       Expected Discharge   Expected discharge date/ time has not been documented.     Claire Higginbotham MD  06/25/25

## 2025-06-26 ENCOUNTER — APPOINTMENT (OUTPATIENT)
Dept: CARDIOLOGY | Facility: HOSPITAL | Age: 85
End: 2025-06-26
Payer: MEDICARE

## 2025-06-26 ENCOUNTER — APPOINTMENT (OUTPATIENT)
Dept: MRI IMAGING | Facility: HOSPITAL | Age: 85
End: 2025-06-26
Payer: MEDICARE

## 2025-06-26 PROBLEM — R53.1 LEFT-SIDED WEAKNESS: Status: ACTIVE | Noted: 2025-06-26

## 2025-06-26 LAB
ALBUMIN SERPL-MCNC: 3.7 G/DL (ref 3.5–5.2)
ALBUMIN/GLOB SERPL: 1.5 G/DL
ALP SERPL-CCNC: 77 U/L (ref 39–117)
ALT SERPL W P-5'-P-CCNC: 40 U/L (ref 1–33)
ANION GAP SERPL CALCULATED.3IONS-SCNC: 15 MMOL/L (ref 5–15)
AORTIC DIMENSIONLESS INDEX: 0.96 (DI)
ASCENDING AORTA: 3 CM
AST SERPL-CCNC: 57 U/L (ref 1–32)
AV MEAN PRESS GRAD SYS DOP V1V2: 3 MMHG
AV VMAX SYS DOP: 126.5 CM/SEC
BH CV ECHO MEAS - 2D AUTO EF SIEMENS: 58.4 %
BH CV ECHO MEAS - AO MAX PG: 6.4 MMHG
BH CV ECHO MEAS - AO ROOT DIAM: 2.9 CM
BH CV ECHO MEAS - AO V2 VTI: 23.4 CM
BH CV ECHO MEAS - AVA(I,D): 3 CM2
BH CV ECHO MEAS - IVS/LVPW: 1 CM
BH CV ECHO MEAS - IVSD: 1 CM
BH CV ECHO MEAS - LA DIMENSION: 3 CM
BH CV ECHO MEAS - LAT PEAK E' VEL: 9.1 CM/SEC
BH CV ECHO MEAS - LV MAX PG: 5.6 MMHG
BH CV ECHO MEAS - LV MEAN PG: 2.1 MMHG
BH CV ECHO MEAS - LV V1 MAX: 118.3 CM/SEC
BH CV ECHO MEAS - LV V1 VTI: 22.4 CM
BH CV ECHO MEAS - LVIDD: 3.8 CM
BH CV ECHO MEAS - LVIDS: 2.4 CM
BH CV ECHO MEAS - LVOT AREA: 3.1 CM2
BH CV ECHO MEAS - LVOT DIAM: 2 CM
BH CV ECHO MEAS - LVPWD: 1 CM
BH CV ECHO MEAS - MED PEAK E' VEL: 9.2 CM/SEC
BH CV ECHO MEAS - MV A MAX VEL: 95.2 CM/SEC
BH CV ECHO MEAS - MV DEC SLOPE: 285 CM/SEC2
BH CV ECHO MEAS - MV E MAX VEL: 52 CM/SEC
BH CV ECHO MEAS - MV E/A: 0.55
BH CV ECHO MEAS - MV MAX PG: 4.6 MMHG
BH CV ECHO MEAS - MV MEAN PG: 1.7 MMHG
BH CV ECHO MEAS - MV P1/2T: 60 MSEC
BH CV ECHO MEAS - MV V2 VTI: 17.8 CM
BH CV ECHO MEAS - MVA(P1/2T): 3.7 CM2
BH CV ECHO MEAS - MVA(VTI): 4 CM2
BH CV ECHO MEAS - PA ACC TIME: 0.12 SEC
BH CV ECHO MEAS - PA V2 MAX: 99.1 CM/SEC
BH CV ECHO MEAS - RAP SYSTOLE: 8 MMHG
BH CV ECHO MEAS - RVSP: 29 MMHG
BH CV ECHO MEAS - SV(LVOT): 70.4 ML
BH CV ECHO MEAS - SVI(LVOT): 35.1 ML/M2
BH CV ECHO MEAS - TAPSE (>1.6): 2.49 CM
BH CV ECHO MEAS - TR MAX PG: 21 MMHG
BH CV ECHO MEAS - TR MAX VEL: 229 CM/SEC
BH CV ECHO MEASUREMENTS AVERAGE E/E' RATIO: 5.68
BH CV VAS BP LEFT ARM: NORMAL MMHG
BH CV XLRA - RV BASE: 3.2 CM
BH CV XLRA - RV LENGTH: 6.7 CM
BH CV XLRA - RV MID: 2.6 CM
BH CV XLRA - TDI S': 14.6 CM/SEC
BILIRUB SERPL-MCNC: 0.5 MG/DL (ref 0–1.2)
BUN SERPL-MCNC: 10.5 MG/DL (ref 8–23)
BUN/CREAT SERPL: 16.9 (ref 7–25)
CALCIUM SPEC-SCNC: 7.9 MG/DL (ref 8.6–10.5)
CHLORIDE SERPL-SCNC: 103 MMOL/L (ref 98–107)
CHOLEST SERPL-MCNC: 131 MG/DL (ref 0–200)
CO2 SERPL-SCNC: 20 MMOL/L (ref 22–29)
CREAT SERPL-MCNC: 0.62 MG/DL (ref 0.57–1)
DEPRECATED RDW RBC AUTO: 46 FL (ref 37–54)
EGFRCR SERPLBLD CKD-EPI 2021: 88.5 ML/MIN/1.73
ERYTHROCYTE [DISTWIDTH] IN BLOOD BY AUTOMATED COUNT: 12.8 % (ref 12.3–15.4)
GLOBULIN UR ELPH-MCNC: 2.5 GM/DL
GLUCOSE BLDC GLUCOMTR-MCNC: 137 MG/DL (ref 70–130)
GLUCOSE SERPL-MCNC: 243 MG/DL (ref 65–99)
HBA1C MFR BLD: 6.57 % (ref 4.8–5.6)
HCT VFR BLD AUTO: 40.2 % (ref 34–46.6)
HDLC SERPL-MCNC: 32 MG/DL (ref 40–60)
HGB BLD-MCNC: 13.1 G/DL (ref 12–15.9)
IVRT: 204 MS
LDLC SERPL CALC-MCNC: 68 MG/DL (ref 0–100)
LDLC/HDLC SERPL: 1.94 {RATIO}
LEFT ATRIUM VOLUME INDEX: 16.1 ML/M2
LV EF 2D ECHO EST: 65 %
MCH RBC QN AUTO: 31.8 PG (ref 26.6–33)
MCHC RBC AUTO-ENTMCNC: 32.6 G/DL (ref 31.5–35.7)
MCV RBC AUTO: 97.6 FL (ref 79–97)
PLATELET # BLD AUTO: 210 10*3/MM3 (ref 140–450)
PMV BLD AUTO: 11 FL (ref 6–12)
POTASSIUM SERPL-SCNC: 4 MMOL/L (ref 3.5–5.2)
PROT SERPL-MCNC: 6.2 G/DL (ref 6–8.5)
RBC # BLD AUTO: 4.12 10*6/MM3 (ref 3.77–5.28)
SODIUM SERPL-SCNC: 138 MMOL/L (ref 136–145)
TRIGL SERPL-MCNC: 185 MG/DL (ref 0–150)
VLDLC SERPL-MCNC: 31 MG/DL (ref 5–40)
WBC NRBC COR # BLD AUTO: 7.11 10*3/MM3 (ref 3.4–10.8)

## 2025-06-26 PROCEDURE — 80053 COMPREHEN METABOLIC PANEL: CPT | Performed by: FAMILY MEDICINE

## 2025-06-26 PROCEDURE — 99233 SBSQ HOSP IP/OBS HIGH 50: CPT | Performed by: FAMILY MEDICINE

## 2025-06-26 PROCEDURE — 99233 SBSQ HOSP IP/OBS HIGH 50: CPT | Performed by: STUDENT IN AN ORGANIZED HEALTH CARE EDUCATION/TRAINING PROGRAM

## 2025-06-26 PROCEDURE — 83036 HEMOGLOBIN GLYCOSYLATED A1C: CPT

## 2025-06-26 PROCEDURE — 92523 SPEECH SOUND LANG COMPREHEN: CPT

## 2025-06-26 PROCEDURE — 97165 OT EVAL LOW COMPLEX 30 MIN: CPT

## 2025-06-26 PROCEDURE — 82948 REAGENT STRIP/BLOOD GLUCOSE: CPT

## 2025-06-26 PROCEDURE — 85027 COMPLETE CBC AUTOMATED: CPT | Performed by: FAMILY MEDICINE

## 2025-06-26 PROCEDURE — 93306 TTE W/DOPPLER COMPLETE: CPT

## 2025-06-26 PROCEDURE — 93306 TTE W/DOPPLER COMPLETE: CPT | Performed by: INTERNAL MEDICINE

## 2025-06-26 PROCEDURE — 70551 MRI BRAIN STEM W/O DYE: CPT

## 2025-06-26 PROCEDURE — 80061 LIPID PANEL: CPT

## 2025-06-26 RX ORDER — FLUTICASONE PROPIONATE 0.05 %
1 CREAM (GRAM) TOPICAL 2 TIMES DAILY
COMMUNITY

## 2025-06-26 RX ADMIN — ATORVASTATIN CALCIUM 40 MG: 40 TABLET, FILM COATED ORAL at 21:11

## 2025-06-26 RX ADMIN — Medication 10 ML: at 08:26

## 2025-06-26 RX ADMIN — ASPIRIN 81 MG: 81 TABLET, CHEWABLE ORAL at 08:26

## 2025-06-26 RX ADMIN — LEVOTHYROXINE SODIUM 25 MCG: 0.03 TABLET ORAL at 04:57

## 2025-06-26 RX ADMIN — CLOPIDOGREL BISULFATE 75 MG: 75 TABLET, FILM COATED ORAL at 08:26

## 2025-06-26 RX ADMIN — Medication 10 ML: at 21:12

## 2025-06-26 RX ADMIN — LATANOPROST 1 DROP: 50 SOLUTION OPHTHALMIC at 21:11

## 2025-06-26 NOTE — PROGRESS NOTES
[facility] Medicine Services  PROGRESS NOTE    Patient Name: Naila Landin  : 1940  MRN: [id number]    Date of Admission: 2025  Primary Care Physician: Tye Turcios MD    Subjective   Subjective     CC:  F/U L sided paresthesias/heaviness     HPI:  Patient seen and examined. She feels fine. Her Sx lasted about 5 minutes. Her MRI brain is pending. She has a bladder stimulator. Asked  to retreive the remote and card.     Objective   Objective     Vital Signs:   Temp:  [97.7 °F (36.5 °C)-98.3 °F (36.8 °C)] 97.7 °F (36.5 °C)  Heart Rate:  [68-76] 76  Resp:  [16-18] 18  BP: (135-204)/(69-92) 180/75     Physical Exam:  Constitutional: No acute distress, awake, alert  HENT: NCAT, mucous membranes moist  Respiratory: Clear to auscultation bilaterally, respiratory effort normal   Cardiovascular: RRR, no murmurs, rubs, or gallops  Gastrointestinal: Positive bowel sounds, soft, nontender, nondistended  Musculoskeletal: No bilateral ankle edema  Psychiatric: Appropriate affect, cooperative  Neurologic: Oriented x 3, BENITEZ, speech clear  Skin: No rashes     Results Reviewed:  LAB RESULTS:      Lab 25  0925  1530 25  1529   WBC 7.11  --  9.17   HEMOGLOBIN 13.1  --  13.2   HEMOGLOBIN, POC  --  13.9  --    HEMATOCRIT 40.2  --  40.8   HEMATOCRIT POC  --  41  --    PLATELETS 210  --  233   NEUTROS ABS  --   --  5.20   IMMATURE GRANS (ABS)  --   --  0.02   LYMPHS ABS  --   --  2.99   MONOS ABS  --   --  0.74   EOS ABS  --   --  0.16   MCV 97.6*  --  96.9   PROTIME  --   --  14.9   APTT  --   --  30.7         Lab 25  0908 25  1530 25  1529   SODIUM 138  --  138   POTASSIUM 4.0  --  3.9   CHLORIDE 103  --  102   CO2 20.0*  --  25.0   ANION GAP 15.0  --  11.0   BUN 10.5  --  17.3   CREATININE 0.62 1.00 0.81   EGFR 88.5 56.0* 72.1   GLUCOSE 243*  --  129*   CALCIUM 7.9*  --  8.7         Lab 25  0908 25  1529   TOTAL PROTEIN  6.2 6.6   ALBUMIN 3.7 3.8   GLOBULIN 2.5 2.8   ALT (SGPT) 40* 47*   AST (SGOT) 57* 75*   BILIRUBIN 0.5 0.4   ALK PHOS 77 86         Lab 06/25/25  1529   PROTIME 14.9   INR 1.10         Lab 06/26/25  0908   CHOLESTEROL 131   LDL CHOL 68   HDL CHOL 32*   TRIGLYCERIDES 185*             Brief Urine Lab Results       None            Microbiology Results Abnormal       None            XR Chest 1 View  Result Date: 6/25/2025  XR CHEST 1 VW Date of Exam: 6/25/2025 3:45 PM EDT Indication: Acute Stroke Protocol (onset < 12 hrs) Comparison: Chest radiograph 11/5/2012. Findings: Patient is rotated. Within these confines, cardiomediastinal silhouette is within normal limits. Probable benign calcified granuloma in the left lung. No focal consolidation. No pleural effusion or pneumothorax. Osseous structures are unremarkable.     Impression: Impression: No acute cardiopulmonary findings. Electronically Signed: Leonard Baxter MD  6/25/2025 4:08 PM EDT  Workstation ID: [id number]    CT Head Without Contrast Stroke Protocol  Result Date: 6/25/2025  CT ANGIOGRAM HEAD W AI ANALYSIS OF LVO, CT CEREBRAL PERFUSION W WO CONTRAST, CT ANGIOGRAM NECK, CT HEAD WO CONTRAST STROKE PROTOCOL Date of Exam: 6/25/2025 3:23 PM EDT Indication: Neuro Deficit, acute, Stroke suspected Neuro deficit, acute stroke suspected. Comparison: None available. Technique: Axial noncontrast CT of the head with multiplanar reconstruction. Axial CT images of the brain were obtained prior to and after the administration of 115 mL . CT Perfusion protocol was utilized. Automated post processing was performed by RAPID  software and submitted to PACS for interpretation.  CTA of the head and neck were performed after the administration of iodinated contrast.  Reconstructed coronal and sagittal images were also obtained. A 3-D volume rendered image was created for interpretation. Automated exposure control and iterative reconstruction methods were used.  FINDINGS: CT HEAD:  Gray-white differentiation is maintained and there is no evidence of intracranial hemorrhage, mass or mass effect. Age-related changes of the brain are present including volume loss and typical periventricular sequela of chronic small vessel ischemia. There is otherwise no evidence of intracranial hemorrhage, mass or mass effect. The ventricles are normal in size and configuration accounting for surrounding volume loss. The orbits are normal and the paranasal sinuses are grossly clear. CT PERFUSION: There is an area of prolonged Tmax seen within the left parietal lobe, left posterior MCA territory, without underlying core infarct, potentially representing some age-indeterminate hypoperfusion or ischemic tissue at risk. CT ANGIOGRAM: The lung apices are clear. Evaluation of the neck soft tissues demonstrates no evidence of aerodigestive tract mass or pathologic cervical lymphadenopathy. Multilevel cervical spondylosis is present, without evidence of acute fracture or aggressive osseous lesion. Patent aortic arch with shared origin of the brachiocephalic and left common carotid arteries. The subclavian arteries appear patent bilaterally, partially obscured on the right. The ICA origins are patent bilaterally with 0% narrowing present by NASCET criteria. The right and left vertebral arteries are patent bilaterally. Intracranially, the carotid siphons demonstrate calcific atherosclerotic changes with some mild narrowing of both ophthalmic segments. The anterior cerebral arteries are patent, with azygos system noted. The right middle cerebral artery is normal in course and caliber. There is some mild multifocal atherosclerotic narrowing of the left M1 segment MCA which is otherwise patent. The vertebrobasilar system is patent. The right posterior cerebral artery is normal in course and caliber. There is mild to moderate narrowing of the left PCA near the P1-P2 junction.     Impression: Age-related changes of the brain as  above, otherwise without evidence of acute intracranial abnormality. CT perfusion demonstrates some nonspecific prolonged Tmax within the left posterior MCA territory, suggesting somewhat age indeterminate hypoperfusion, with acute component/ischemic tissue at risk difficult to exclude. Multifocal cervical and intracranial atherosclerotic changes are present, without evidence of focal high-grade stenosis, large vessel occlusion or aneurysm. Electronically Signed: Derek Adkins MD  6/25/2025 4:01 PM EDT  Workstation ID: [id number]    CT Angiogram Head w AI Analysis of LVO  Result Date: 6/25/2025  CT ANGIOGRAM HEAD W AI ANALYSIS OF LVO, CT CEREBRAL PERFUSION W WO CONTRAST, CT ANGIOGRAM NECK, CT HEAD WO CONTRAST STROKE PROTOCOL Date of Exam: 6/25/2025 3:23 PM EDT Indication: Neuro Deficit, acute, Stroke suspected Neuro deficit, acute stroke suspected. Comparison: None available. Technique: Axial noncontrast CT of the head with multiplanar reconstruction. Axial CT images of the brain were obtained prior to and after the administration of 115 mL . CT Perfusion protocol was utilized. Automated post processing was performed by RAPID  software and submitted to PACS for interpretation.  CTA of the head and neck were performed after the administration of iodinated contrast.  Reconstructed coronal and sagittal images were also obtained. A 3-D volume rendered image was created for interpretation. Automated exposure control and iterative reconstruction methods were used.  FINDINGS: CT HEAD: Gray-white differentiation is maintained and there is no evidence of intracranial hemorrhage, mass or mass effect. Age-related changes of the brain are present including volume loss and typical periventricular sequela of chronic small vessel ischemia. There is otherwise no evidence of intracranial hemorrhage, mass or mass effect. The ventricles are normal in size and configuration accounting for surrounding volume loss. The orbits are normal  and the paranasal sinuses are grossly clear. CT PERFUSION: There is an area of prolonged Tmax seen within the left parietal lobe, left posterior MCA territory, without underlying core infarct, potentially representing some age-indeterminate hypoperfusion or ischemic tissue at risk. CT ANGIOGRAM: The lung apices are clear. Evaluation of the neck soft tissues demonstrates no evidence of aerodigestive tract mass or pathologic cervical lymphadenopathy. Multilevel cervical spondylosis is present, without evidence of acute fracture or aggressive osseous lesion. Patent aortic arch with shared origin of the brachiocephalic and left common carotid arteries. The subclavian arteries appear patent bilaterally, partially obscured on the right. The ICA origins are patent bilaterally with 0% narrowing present by NASCET criteria. The right and left vertebral arteries are patent bilaterally. Intracranially, the carotid siphons demonstrate calcific atherosclerotic changes with some mild narrowing of both ophthalmic segments. The anterior cerebral arteries are patent, with azygos system noted. The right middle cerebral artery is normal in course and caliber. There is some mild multifocal atherosclerotic narrowing of the left M1 segment MCA which is otherwise patent. The vertebrobasilar system is patent. The right posterior cerebral artery is normal in course and caliber. There is mild to moderate narrowing of the left PCA near the P1-P2 junction.     Impression: Age-related changes of the brain as above, otherwise without evidence of acute intracranial abnormality. CT perfusion demonstrates some nonspecific prolonged Tmax within the left posterior MCA territory, suggesting somewhat age indeterminate hypoperfusion, with acute component/ischemic tissue at risk difficult to exclude. Multifocal cervical and intracranial atherosclerotic changes are present, without evidence of focal high-grade stenosis, large vessel occlusion or aneurysm.  Electronically Signed: Derek Adkins MD  6/25/2025 4:01 PM EDT  Workstation ID: [id number]    CT Angiogram Neck  Result Date: 6/25/2025  CT ANGIOGRAM HEAD W AI ANALYSIS OF LVO, CT CEREBRAL PERFUSION W WO CONTRAST, CT ANGIOGRAM NECK, CT HEAD WO CONTRAST STROKE PROTOCOL Date of Exam: 6/25/2025 3:23 PM EDT Indication: Neuro Deficit, acute, Stroke suspected Neuro deficit, acute stroke suspected. Comparison: None available. Technique: Axial noncontrast CT of the head with multiplanar reconstruction. Axial CT images of the brain were obtained prior to and after the administration of 115 mL . CT Perfusion protocol was utilized. Automated post processing was performed by RAPID  software and submitted to PACS for interpretation.  CTA of the head and neck were performed after the administration of iodinated contrast.  Reconstructed coronal and sagittal images were also obtained. A 3-D volume rendered image was created for interpretation. Automated exposure control and iterative reconstruction methods were used.  FINDINGS: CT HEAD: Gray-white differentiation is maintained and there is no evidence of intracranial hemorrhage, mass or mass effect. Age-related changes of the brain are present including volume loss and typical periventricular sequela of chronic small vessel ischemia. There is otherwise no evidence of intracranial hemorrhage, mass or mass effect. The ventricles are normal in size and configuration accounting for surrounding volume loss. The orbits are normal and the paranasal sinuses are grossly clear. CT PERFUSION: There is an area of prolonged Tmax seen within the left parietal lobe, left posterior MCA territory, without underlying core infarct, potentially representing some age-indeterminate hypoperfusion or ischemic tissue at risk. CT ANGIOGRAM: The lung apices are clear. Evaluation of the neck soft tissues demonstrates no evidence of aerodigestive tract mass or pathologic cervical lymphadenopathy. Multilevel  cervical spondylosis is present, without evidence of acute fracture or aggressive osseous lesion. Patent aortic arch with shared origin of the brachiocephalic and left common carotid arteries. The subclavian arteries appear patent bilaterally, partially obscured on the right. The ICA origins are patent bilaterally with 0% narrowing present by NASCET criteria. The right and left vertebral arteries are patent bilaterally. Intracranially, the carotid siphons demonstrate calcific atherosclerotic changes with some mild narrowing of both ophthalmic segments. The anterior cerebral arteries are patent, with azygos system noted. The right middle cerebral artery is normal in course and caliber. There is some mild multifocal atherosclerotic narrowing of the left M1 segment MCA which is otherwise patent. The vertebrobasilar system is patent. The right posterior cerebral artery is normal in course and caliber. There is mild to moderate narrowing of the left PCA near the P1-P2 junction.     Impression: Age-related changes of the brain as above, otherwise without evidence of acute intracranial abnormality. CT perfusion demonstrates some nonspecific prolonged Tmax within the left posterior MCA territory, suggesting somewhat age indeterminate hypoperfusion, with acute component/ischemic tissue at risk difficult to exclude. Multifocal cervical and intracranial atherosclerotic changes are present, without evidence of focal high-grade stenosis, large vessel occlusion or aneurysm. Electronically Signed: Derek Adkins MD  6/25/2025 4:01 PM EDT  Workstation ID: [id number]    CT CEREBRAL PERFUSION WITH & WITHOUT CONTRAST  Result Date: 6/25/2025  CT ANGIOGRAM HEAD W AI ANALYSIS OF LVO, CT CEREBRAL PERFUSION W WO CONTRAST, CT ANGIOGRAM NECK, CT HEAD WO CONTRAST STROKE PROTOCOL Date of Exam: 6/25/2025 3:23 PM EDT Indication: Neuro Deficit, acute, Stroke suspected Neuro deficit, acute stroke suspected. Comparison: None available. Technique:  Axial noncontrast CT of the head with multiplanar reconstruction. Axial CT images of the brain were obtained prior to and after the administration of 115 mL . CT Perfusion protocol was utilized. Automated post processing was performed by RAPID  software and submitted to PACS for interpretation.  CTA of the head and neck were performed after the administration of iodinated contrast.  Reconstructed coronal and sagittal images were also obtained. A 3-D volume rendered image was created for interpretation. Automated exposure control and iterative reconstruction methods were used.  FINDINGS: CT HEAD: Gray-white differentiation is maintained and there is no evidence of intracranial hemorrhage, mass or mass effect. Age-related changes of the brain are present including volume loss and typical periventricular sequela of chronic small vessel ischemia. There is otherwise no evidence of intracranial hemorrhage, mass or mass effect. The ventricles are normal in size and configuration accounting for surrounding volume loss. The orbits are normal and the paranasal sinuses are grossly clear. CT PERFUSION: There is an area of prolonged Tmax seen within the left parietal lobe, left posterior MCA territory, without underlying core infarct, potentially representing some age-indeterminate hypoperfusion or ischemic tissue at risk. CT ANGIOGRAM: The lung apices are clear. Evaluation of the neck soft tissues demonstrates no evidence of aerodigestive tract mass or pathologic cervical lymphadenopathy. Multilevel cervical spondylosis is present, without evidence of acute fracture or aggressive osseous lesion. Patent aortic arch with shared origin of the brachiocephalic and left common carotid arteries. The subclavian arteries appear patent bilaterally, partially obscured on the right. The ICA origins are patent bilaterally with 0% narrowing present by NASCET criteria. The right and left vertebral arteries are patent bilaterally.  Intracranially, the carotid siphons demonstrate calcific atherosclerotic changes with some mild narrowing of both ophthalmic segments. The anterior cerebral arteries are patent, with azygos system noted. The right middle cerebral artery is normal in course and caliber. There is some mild multifocal atherosclerotic narrowing of the left M1 segment MCA which is otherwise patent. The vertebrobasilar system is patent. The right posterior cerebral artery is normal in course and caliber. There is mild to moderate narrowing of the left PCA near the P1-P2 junction.     Impression: Age-related changes of the brain as above, otherwise without evidence of acute intracranial abnormality. CT perfusion demonstrates some nonspecific prolonged Tmax within the left posterior MCA territory, suggesting somewhat age indeterminate hypoperfusion, with acute component/ischemic tissue at risk difficult to exclude. Multifocal cervical and intracranial atherosclerotic changes are present, without evidence of focal high-grade stenosis, large vessel occlusion or aneurysm. Electronically Signed: Derek Adkins MD  6/25/2025 4:01 PM EDT  Workstation ID: [id number]      Results for orders placed during the hospital encounter of 06/25/25    Adult Transthoracic Echo Complete W/ Cont if Necessary Per Protocol (With Agitated Saline)    Interpretation Summary    Left ventricular systolic function is normal. Estimated left ventricular EF = 65%    The cardiac valves are anatomically and functionally normal.      Current medications:  Scheduled Meds:[Held by provider] amLODIPine, 5 mg, Oral, Q24H  aspirin, 81 mg, Oral, Daily   Or  aspirin, 300 mg, Rectal, Daily  atorvastatin, 40 mg, Oral, Nightly  clopidogrel, 75 mg, Oral, Daily  latanoprost, 1 drop, Both Eyes, Nightly  levothyroxine, 25 mcg, Oral, Q AM  [Held by provider] metoprolol succinate XL, 100 mg, Oral, Nightly  sodium chloride, 10 mL, Intravenous, Q12H  sodium chloride, 10 mL, Intravenous,  Q12H      Continuous Infusions:   PRN Meds:.  senna-docusate sodium **AND** polyethylene glycol **AND** bisacodyl **AND** bisacodyl    Calcium Replacement - Follow Nurse / BPA Driven Protocol    Magnesium Standard Dose Replacement - Follow Nurse / BPA Driven Protocol    nitroglycerin    Phosphorus Replacement - Follow Nurse / BPA Driven Protocol    Potassium Replacement - Follow Nurse / BPA Driven Protocol    sodium chloride    sodium chloride    sodium chloride    sodium chloride    sodium chloride    Assessment & Plan   Assessment & Plan     Active Hospital Problems    Diagnosis  POA    **TIA (transient ischemic attack) [G45.9]  Yes      Resolved Hospital Problems   No resolved problems to display.        Brief Hospital Course to date:  Naila Landin is a 85 y.o. female  with known medical diagnoses of essential hypertension, hyperlipidemia, obesity, cecal neoplasm s/p laparoscopic right colectomy (2024), and right breast cancer who presents to the emergency department via EMS for further evaluation of transient episode of left-sided paresthesias/heaviness.      This patient's problems and plans were partially entered by my partner and updated as appropriate by me 06/26/25.   All problems are new to me today    Transient left-sided paresthesias/heaviness, etiology right hemispheric TIA versus CVA  -MRI brain pending.  retrieving bladder stimulator remote and card.  -ASA 81, Plavix 75mg, Statin  -Echo pending  -Stroke Neuro to follow  -OT signed off. Awaiting PT eval     Essential hypertension  -BP parameters per Neuro  -Metoprolol, Amlodipine on hold     Elevated LFTs  -trended down     Hyperlipidemia  -Lipid panel reviewed     Expected Discharge Location and Transportation: Home  Expected Discharge   Expected Discharge Date: 6/27/2025; Expected Discharge Time:      VTE Prophylaxis:  Mechanical VTE prophylaxis orders are present.         AM-PAC 6 Clicks Score (PT): 24 (06/26/25 0800)    CODE STATUS:    Code Status and Medical Interventions: CPR (Attempt to Resuscitate); Full Support   Ordered at: 06/25/25 1729     Code Status (Patient has no pulse and is not breathing):    CPR (Attempt to Resuscitate)     Medical Interventions (Patient has pulse or is breathing):    Full Support     Level Of Support Discussed With:    Patient       Trinidad GALICIA Carlos,   06/26/25

## 2025-06-26 NOTE — CASE MANAGEMENT/SOCIAL WORK
Discharge Planning Assessment  [facility]     Patient Name: Naila Landin  MRN: [id number]  Today's Date: 6/26/2025    Admit Date: 6/25/2025    Plan: IDP   Discharge Needs Assessment       Row Name 06/26/25 1345       Living Environment    People in Home spouse    Name(s) of People in Home Marcos    Current Living Arrangements home    Potentially Unsafe Housing Conditions none    In the past 12 months has the electric, gas, oil, or water company threatened to shut off services in your home? No    Primary Care Provided by self    Provides Primary Care For no one    Family Caregiver if Needed spouse    Family Caregiver Names Marcos    Quality of Family Relationships supportive;involved;helpful    Able to Return to Prior Arrangements yes       Resource/Environmental Concerns    Resource/Environmental Concerns none    Home Accessibility Concerns none    Transportation Concerns none       Transportation Needs    In the past 12 months, has lack of transportation kept you from medical appointments or from getting medications? no    In the past 12 months, has lack of transportation kept you from meetings, work, or from getting things needed for daily living? No       Food Insecurity    Within the past 12 months, you worried that your food would run out before you got the money to buy more. Never true    Within the past 12 months, the food you bought just didn't last and you didn't have money to get more. Never true       Transition Planning    Patient/Family Anticipates Transition to home with family    Patient/Family Anticipated Services at Transition none    Transportation Anticipated family or friend will provide       Discharge Needs Assessment    Readmission Within the Last 30 Days no previous admission in last 30 days    Equipment Currently Used at Home none    Concerns to be Addressed no discharge needs identified;denies needs/concerns at this time    Do you want help finding or keeping work or a job? I do not  need or want help    Do you want help with school or training? For example, starting or completing job training or getting a high school diploma, GED or equivalent No    Anticipated Changes Related to Illness none    Equipment Needed After Discharge none                   Discharge Plan       Row Name 06/26/25 3826       Plan    Plan IDP    Patient/Family in Agreement with Plan yes    Plan Comments Spoke with patient and spouse at bedside for IDP. Lives in [facility] in house. IADL-drives. No DME/HH/OPPT. Current with PCP in [facility]. Medicare A&B and [facility] with scripts filled at [facility]. Plan is home with spouse to transport. CM will cont to follow    Final Discharge Disposition Code 01 - home or self-care                    Expected Discharge Date and Time       Expected Discharge Date Expected Discharge Time    Jun 27, 2025            Demographic Summary       Row Name 06/26/25 1343       General Information    Admission Type observation    Arrived From emergency department;home    Referral Source admission list    Preferred Language English                   Functional Status       Row Name 06/26/25 1345       Functional Status    Usual Activity Tolerance good       Functional Status, IADL    Medications independent    Meal Preparation independent    Housekeeping independent    Laundry independent    Shopping independent    If for any reason you need help with day-to-day activities such as bathing, preparing meals, shopping, managing finances, etc., do you get the help you need? I get all the help I need                   Psychosocial    No documentation.                  Abuse/Neglect    No documentation.                  Legal    No documentation.                  Substance Abuse    No documentation.                  Patient Forms    No documentation.                     Kaia Barragan RN

## 2025-06-26 NOTE — PLAN OF CARE
Goal Outcome Evaluation:  Plan of Care Reviewed With: patient        Progress: no change  Outcome Evaluation: Pt presents at baseline for ADL completion w/ symmetrical BUE support and coordination/sensation intact. OT signing off, please reconsult if needed. Rec d/c to home when medically appropriate.    Anticipated Discharge Disposition (OT): home

## 2025-06-26 NOTE — THERAPY DISCHARGE NOTE
Acute Care - Speech Language Pathology Initial Evaluation/Discharge  [facility]  Cognitive-Communication Evaluation       Patient Name: Naila Landin  : 1940  MRN: [id number]  Today's Date: 2025               Admit Date: 2025     Visit Dx:    ICD-10-CM ICD-9-CM   1. Acute left-sided weakness  R53.1 728.87   2. Intermittent paresthesia of left hand and foot  R20.2 782.0     Patient Active Problem List   Diagnosis    Cecal neoplasm    HTN (hypertension)    Hypothyroidism (acquired)    S/P right colectomy, laparoscopic with extensive JESSICA    Elevated hemoglobin A1c    TIA (transient ischemic attack)    Left-sided weakness     Past Medical History:   Diagnosis Date    Cecal neoplasm     Disease of thyroid gland     Diverticulitis     Hypertension     Urinary frequency      Past Surgical History:   Procedure Laterality Date    ABDOMINAL HERNIA REPAIR      with mesh    APPENDECTOMY      CHOLECYSTECTOMY      COLON RESECTION Right 2024    Procedure: OPEN RIGHT COLECTOMY, LAPAROSCOPIC LYSIS OF ADHESIONS;  Surgeon: Caleb Hirsch MD;  Location: [facility];  Service: General;  Laterality: Right;    COLON SURGERY      resection due to diverticulitis    COLONOSCOPY      serveral    HYSTERECTOMY      KNEE ARTHROPLASTY Right     OTHER SURGICAL HISTORY      bladder stimulator    TONSILLECTOMY         SLP Recommendation and Plan  SLP Diagnosis: functional speech/language skills, functional cognitive-linguistic skills (25 1327)  SLP Diagnosis Comments: Pt presents with grossly functional speech and language skills. Pt reports she feels at baseline at this time. (25 1327)     Swallow Criteria for Skilled Therapeutic Interventions Met: no problems identified which require skilled intervention (25 1327)  SLC Criteria for Skilled Therapy Interventions Met: no problems identified which require skilled intervention (25 1327)  Anticipated Discharge Disposition (SLP): No further SLP  services warranted (06/26/25 1327)     Therapy Frequency (SLP SLC): evaluation only (06/26/25 1327)                                     Progress:  (eval) (06/26/25 1453)  Outcome Evaluation: Pt presents with grossly  functional speech and language skills at this time. (06/26/25 1453)    SLP EVALUATION (Last 72 Hours)       SLP SLC Evaluation       Row Name 06/26/25 1327                   Communication Assessment/Intervention    Subjective Information no complaints  -MM        Patient Observations alert;cooperative  -MM        Patient/Family/Caregiver Comments/Observations no family present  -MM        Patient Effort good  -MM        Symptoms Noted During/After Treatment none  -MM           General Information    Patient Profile Reviewed yes  -MM        Pertinent History Of Current Problem Pt is an 85 year old female who presented to the facility with c/o transient L parasthesias/heaviness suspect TIA.  -MM        Precautions/Limitations, Vision WFL;for purposes of eval  -MM        Precautions/Limitations, Hearing WFL;for purposes of eval  -MM        Prior Level of Function-Communication WFL  -MM        Plans/Goals Discussed with patient;agreed upon  -MM        Barriers to Rehab none identified  -MM        Patient's Goals for Discharge return to home  -MM           Pain    Pretreatment Pain Rating 0/10 - no pain  -MM        Posttreatment Pain Rating 0/10 - no pain  -MM           Comprehension Assessment/Intervention    Comprehension Assessment/Intervention Auditory Comprehension;Reading Comprehension  -MM           Auditory Comprehension Assessment/Intervention    Auditory Comprehension (Communication) WFL  -MM        Narrative Discourse WFL  -MM           Reading Comprehension Assessment/Intervention    Reading Comprehension (Communication) WFL  -MM        Paragraph Level WFL  -MM           Expression Assessment/Intervention    Expression Assessment/Intervention verbal expression;graphic expression  -MM            Verbal Expression Assessment/Intervention    Verbal Expression WFL  -MM        Conversational Discourse/Fluency WFL  -MM           Graphic Expression Assessment/Intervention    Graphic Expression WFL  -MM        Sentence Formulation WFL  -MM           Motor Speech Assessment/Intervention    Motor Speech Function WFL  -MM        Conversational Speech (Communication) WFL  -MM        Speech intelligibility 100%;in quiet environment;in connected speech;with unfamiliar listener  -MM           Cursory Voice Assessment/Intervention    Quality and Resonance (Voice) WFL  -MM           SLP Evaluation Clinical Impressions    SLP Diagnosis functional speech/language skills;functional cognitive-linguistic skills  -MM        SLP Diagnosis Comments Pt presents with grossly functional speech and language skills. Pt reports she feels at baseline at this time.  -MM        SLC Criteria for Skilled Therapy Interventions Met no problems identified which require skilled intervention  -MM           Recommendations    Therapy Frequency (SLP SLC) evaluation only  -MM                  User Key  (r) = Recorded By, (t) = Taken By, (c) = Cosigned By      Initials Name Effective Dates    Natalia Sexton MS CCC-SLP 08/30/24 -                        EDUCATION  The patient has been educated in the following areas:   Evaluation results and recommendations.                    Time Calculation:    Time Calculation- SLP       Row Name 06/26/25 1454             Time Calculation- SLP    SLP Start Time 1327  -MM      SLP Received On 06/26/25  -MM         Untimed Charges    87791-ZG Eval Speech and Production w/ Language Minutes 26  -MM         Total Minutes    Untimed Charges Total Minutes 26  -MM       Total Minutes 26  -MM                User Key  (r) = Recorded By, (t) = Taken By, (c) = Cosigned By      Initials Name Provider Type    Natalia Sexton MS CCC-SLP Speech and Language Pathologist                    Therapy Charges for Today       Code  Description Service Date Service Provider Modifiers Qty    [id number] HC ST EVAL SPEECH AND PROD W LANG  2 6/26/2025 Natalia Charlton, MS CCC-SLP GN 1                     SLP Discharge Summary  Anticipated Discharge Disposition (SLP): No further SLP services warranted    Natalia Charlton MS CCC-SLP  6/26/2025

## 2025-06-26 NOTE — PLAN OF CARE
Goal Outcome Evaluation:  Plan of Care Reviewed With: patient        Progress:  (eval)  Outcome Evaluation: Pt presents with grossly  functional speech and language skills at this time.    Anticipated Discharge Disposition (SLP): No further SLP services warranted    SLP Diagnosis: functional speech/language skills, functional cognitive-linguistic skills (06/26/25 1327)  SLP Diagnosis Comments: Pt presents with grossly functional speech and language skills. Pt reports she feels at baseline at this time. (06/26/25 1327)  SLP Swallowing Diagnosis: swallow WFL/no suspected pharyngeal impairment (06/26/25 1327)

## 2025-06-26 NOTE — PROGRESS NOTES
Stroke Progress Note       Chief Complaint: Left-sided numbness    Subjective    Subjective     Subjective:  The patient is lying down in the bed in NAD.   was at the bedside. The patient stated that she is doing better this morning compared to yesterday and denies having any new stroke or strokelike symptoms.  Stated that yesterday she had 1 episode where she felt tingly on the left upper and lower extremities that lasted for less than 10 minutes.  She denies having any weakness, speech difficulties, or vision disturbance.  I discussed with the patient and her  the imaging findings what could possibly explain her symptoms.  We also discussed management plan moving forward.  All patient's questions and concerns were answered.    No other acute complains at this time    Review of Systems   Neurological: Negative for tremors.   Objective      Temp:  [97.7 °F (36.5 °C)-98.3 °F (36.8 °C)] 98 °F (36.7 °C)  Heart Rate:  [68-77] 76  Resp:  [16-18] 18  BP: (135-204)/(69-92) 180/75    Objective    GEN: lying in bed; in NAD  HENT: normocephalic, non-erythematous oropharynx  CV: no LE edema    NEURO:  Mental Status: A&O x 3, interactive, able to follow commands  Speech: Intact Articulation  CN 2-12:  II - PERRLA  III, IV, VI - EOMI  V - Facial sensation intact  VII -no gross facial asymmetry  VIII - Auditory acuity intact  XII - Tongue protrudes midline    Motor: The patient can move all 4 extremities against gravity with no drift appreciated  Sensory: intact light touch throughout  Reflexes: negative Gunn's sign BL  Coordination: no ataxia with finger-to-nose testing  Gait/Station: deferred     Results Review:    I reviewed the patient's new clinical results.    WBC   Date Value Ref Range Status   06/26/2025 7.11 3.40 - 10.80 10*3/mm3 Final     RBC   Date Value Ref Range Status   06/26/2025 4.12 3.77 - 5.28 10*6/mm3 Final     Hemoglobin   Date Value Ref Range Status   06/26/2025 13.1 12.0 - 15.9 g/dL Final      Hematocrit   Date Value Ref Range Status   06/26/2025 40.2 34.0 - 46.6 % Final     MCV   Date Value Ref Range Status   06/26/2025 97.6 (H) 79.0 - 97.0 fL Final     MCH   Date Value Ref Range Status   06/26/2025 31.8 26.6 - 33.0 pg Final     MCHC   Date Value Ref Range Status   06/26/2025 32.6 31.5 - 35.7 g/dL Final     RDW   Date Value Ref Range Status   06/26/2025 12.8 12.3 - 15.4 % Final     RDW-SD   Date Value Ref Range Status   06/26/2025 46.0 37.0 - 54.0 fl Final     MPV   Date Value Ref Range Status   06/26/2025 11.0 6.0 - 12.0 fL Final     Platelets   Date Value Ref Range Status   06/26/2025 210 140 - 450 10*3/mm3 Final     Neutrophil %   Date Value Ref Range Status   06/25/2025 56.7 42.7 - 76.0 % Final     Lymphocyte %   Date Value Ref Range Status   06/25/2025 32.6 19.6 - 45.3 % Final     Monocyte %   Date Value Ref Range Status   06/25/2025 8.1 5.0 - 12.0 % Final     Eosinophil %   Date Value Ref Range Status   06/25/2025 1.7 0.3 - 6.2 % Final     Basophil %   Date Value Ref Range Status   06/25/2025 0.7 0.0 - 1.5 % Final     Immature Grans %   Date Value Ref Range Status   06/25/2025 0.2 0.0 - 0.5 % Final     Neutrophils, Absolute   Date Value Ref Range Status   06/25/2025 5.20 1.70 - 7.00 10*3/mm3 Final     Lymphocytes, Absolute   Date Value Ref Range Status   06/25/2025 2.99 0.70 - 3.10 10*3/mm3 Final     Monocytes, Absolute   Date Value Ref Range Status   06/25/2025 0.74 0.10 - 0.90 10*3/mm3 Final     Eosinophils, Absolute   Date Value Ref Range Status   06/25/2025 0.16 0.00 - 0.40 10*3/mm3 Final     Basophils, Absolute   Date Value Ref Range Status   06/25/2025 0.06 0.00 - 0.20 10*3/mm3 Final     Immature Grans, Absolute   Date Value Ref Range Status   06/25/2025 0.02 0.00 - 0.05 10*3/mm3 Final     nRBC   Date Value Ref Range Status   06/25/2025 0.0 0.0 - 0.2 /100 WBC Final       Lab Results   Component Value Date    GLUCOSE 243 (H) 06/26/2025    BUN 10.5 06/26/2025    CREATININE 0.62 06/26/2025      06/26/2025    K 4.0 06/26/2025     06/26/2025    CALCIUM 7.9 (L) 06/26/2025    PROTEINTOT 6.2 06/26/2025    ALBUMIN 3.7 06/26/2025    ALT 40 (H) 06/26/2025    AST 57 (H) 06/26/2025    ALKPHOS 77 06/26/2025    BILITOT 0.5 06/26/2025    GLOB 2.5 06/26/2025    AGRATIO 1.5 06/26/2025    BCR 16.9 06/26/2025    ANIONGAP 15.0 06/26/2025    EGFR 88.5 06/26/2025     XR Chest 1 View  Result Date: 6/25/2025  Impression: No acute cardiopulmonary findings. Electronically Signed: Leonard Baxter MD  6/25/2025 4:08 PM EDT  Workstation ID: [id number]    CT Head Without Contrast Stroke Protocol  Result Date: 6/25/2025  Age-related changes of the brain as above, otherwise without evidence of acute intracranial abnormality. CT perfusion demonstrates some nonspecific prolonged Tmax within the left posterior MCA territory, suggesting somewhat age indeterminate hypoperfusion, with acute component/ischemic tissue at risk difficult to exclude. Multifocal cervical and intracranial atherosclerotic changes are present, without evidence of focal high-grade stenosis, large vessel occlusion or aneurysm. Electronically Signed: Derek Adkins MD  6/25/2025 4:01 PM EDT  Workstation ID: [id number]    CT Angiogram Head w AI Analysis of LVO  Result Date: 6/25/2025  Age-related changes of the brain as above, otherwise without evidence of acute intracranial abnormality. CT perfusion demonstrates some nonspecific prolonged Tmax within the left posterior MCA territory, suggesting somewhat age indeterminate hypoperfusion, with acute component/ischemic tissue at risk difficult to exclude. Multifocal cervical and intracranial atherosclerotic changes are present, without evidence of focal high-grade stenosis, large vessel occlusion or aneurysm. Electronically Signed: Derek Adkins MD  6/25/2025 4:01 PM EDT  Workstation ID: [id number]    CT Angiogram Neck  Result Date: 6/25/2025  Age-related changes of the brain as above, otherwise without evidence of  acute intracranial abnormality. CT perfusion demonstrates some nonspecific prolonged Tmax within the left posterior MCA territory, suggesting somewhat age indeterminate hypoperfusion, with acute component/ischemic tissue at risk difficult to exclude. Multifocal cervical and intracranial atherosclerotic changes are present, without evidence of focal high-grade stenosis, large vessel occlusion or aneurysm. Electronically Signed: Derek Adkins MD  6/25/2025 4:01 PM EDT  Workstation ID: [id number]    CT CEREBRAL PERFUSION WITH & WITHOUT CONTRAST  Result Date: 6/25/2025  Age-related changes of the brain as above, otherwise without evidence of acute intracranial abnormality. CT perfusion demonstrates some nonspecific prolonged Tmax within the left posterior MCA territory, suggesting somewhat age indeterminate hypoperfusion, with acute component/ischemic tissue at risk difficult to exclude. Multifocal cervical and intracranial atherosclerotic changes are present, without evidence of focal high-grade stenosis, large vessel occlusion or aneurysm. Electronically Signed: Derek Adkins MD  6/25/2025 4:01 PM EDT  Workstation ID: [id number]    Results for orders placed during the hospital encounter of 06/25/25    Adult Transthoracic Echo Complete W/ Cont if Necessary Per Protocol (With Agitated Saline)    Interpretation Summary    Left ventricular systolic function is normal. Estimated left ventricular EF = 65%    The cardiac valves are anatomically and functionally normal.    -CTH wo on 6/25/2025 images were personally reviewed and showed no acute ischemic or hemorrhagic stroke  -CTA of the head and neck images from 6/25/2025 were personally reviewed and showed no flow limiting stenosis in the extracranial carotid and vertebral arteries.  There was no LVO in the intracranial circulation.  There was multifocal stenosis of the bilateral MCA and PCA territories.  Was stenosis on the superior M2 division of the right MCA.  -MRI brain  images is pending  -Transthoracic echocardiogram from 6/26/2025 report was personally reviewed and showed left ventricular ejection fraction of 65%, no left atrial dilation.  Bubble study was deferred  -A1c is pending  -LDL from 6/26/2025 was 68      Assessment/Plan     This is an 85-year-old female with known medical diagnoses of essential hypertension, hyperlipidemia, obesity, cecal neoplasm s/p laparoscopic right colectomy (2024), and right breast cancer who presents to the emergency department via EMS for further evaluation of recurrent transient episodes of left-sided weakness and numbness which she first noted approximately 1 hour prior to arrival.  Patient states that she has had 2-3 episodes where her left side becomes weak and numb for approximately 5 minutes before spontaneously resolving.  On arrival to our facility the patient is asymptomatic therefore she is not a candidate for IV thrombolytic therapy.  CTA head/neck/perfusion revealed reveals multifocal iCAD however no evidence of flow-limiting stenosis or LVO.  She will require admission to the hospital service for further workup.     Antiplatelet PTA: None   Anticoagulant PTA: None     #Transient left-sided weakness and numbness-resolved  #Hx of uncontrolled hypertension  #Bilateral multifocal intracranial arterial stenosis  -Etiology of patient's symptoms could possibly represent transient ischemic attack versus symptomatic intracranial stenosis given the moderate/severe stenosis of the superior M2 division of the right MCA  -CTH wo on 6/25/2025 images were personally reviewed and showed no acute ischemic or hemorrhagic stroke  -CTA of the head and neck images from 6/25/2025 were personally reviewed and showed no flow limiting stenosis in the extracranial carotid and vertebral arteries.  There was no LVO in the intracranial circulation.  There was multifocal stenosis of the bilateral MCA and PCA territories.  Was stenosis on the superior M2 division  of the right MCA.  -MRI brain images is pending  -Transthoracic echocardiogram from 6/26/2025 report was personally reviewed and showed left ventricular ejection fraction of 65%, no left atrial dilation.  Bubble study was deferred  -A1c is pending  -LDL from 6/26/2025 was 68    Recommendation  -MRI brain without contrast  -Continue DAPT with ASA 81 mg and Plavix 75 mg daily for 90 days with a plan to continue aspirin 81 mg daily monotherapy afterward for secondary stroke prevention.  -Continue atorvastatin 40 mg nightly for secondary stroke prevention.  Target LDL level of less than 70  -Target systolic blood pressure goals between 130-160.  Avoid hypotension and avoid dehydration.  Please adjust antihypertensive medications accordingly  -Activity as tolerated, fall risk precautions  -PT/OT/SLP evaluation     #Essential hypertension  - As detailed above     #Hyperlipidemia  - As detailed above         Stroke will continue to follow. Please call for any further questions or concerns  =================================  Mustapha Matthew MD, Msc, PhD  Vascular Neurologist  [facility]

## 2025-06-26 NOTE — THERAPY DISCHARGE NOTE
Acute Care - Occupational Therapy Discharge  [facility]    Patient Name: Naila Landin  : 1940    MRN: [id number]                              Today's Date: 2025       Admit Date: 2025    Visit Dx:     ICD-10-CM ICD-9-CM   1. Acute left-sided weakness  R53.1 728.87   2. Intermittent paresthesia of left hand and foot  R20.2 782.0     Patient Active Problem List   Diagnosis    Cecal neoplasm    HTN (hypertension)    Hypothyroidism (acquired)    S/P right colectomy, laparoscopic with extensive JESSICA    Elevated hemoglobin A1c    TIA (transient ischemic attack)     Past Medical History:   Diagnosis Date    Cecal neoplasm     Disease of thyroid gland     Diverticulitis     Hypertension     Urinary frequency      Past Surgical History:   Procedure Laterality Date    ABDOMINAL HERNIA REPAIR      with mesh    APPENDECTOMY      CHOLECYSTECTOMY      COLON RESECTION Right 2024    Procedure: OPEN RIGHT COLECTOMY, LAPAROSCOPIC LYSIS OF ADHESIONS;  Surgeon: Caleb Hirsch MD;  Location: [facility], [facility];  Service: General;  Laterality: Right;    COLON SURGERY      resection due to diverticulitis    COLONOSCOPY      serveral    HYSTERECTOMY      KNEE ARTHROPLASTY Right     OTHER SURGICAL HISTORY      bladder stimulator    TONSILLECTOMY        General Information       Row Name 25 0931          OT Time and Intention    Document Type discharge evaluation/summary  -CS     Mode of Treatment occupational therapy  -CS       Row Name 2531          General Information    Patient Profile Reviewed yes  -CS     Prior Level of Function independent:;all household mobility;ADL's;driving  No AD reported for mobility  -CS     Existing Precautions/Restrictions fall  -CS     Barriers to Rehab medically complex  -CS       Row Name 25 0931          Occupational Profile    Reason for Services/Referral (Occupational Profile) stroke eval, discharge planning  -CS       Row Name 25 0931           Living Environment    Current Living Arrangements home  -CS     People in Home spouse  -CS       Row Name 06/26/25 0931          Home Main Entrance    Number of Stairs, Main Entrance two  -CS       Row Name 06/26/25 0931          Cognition    Orientation Status (Cognition) oriented x 4  -CS       Row Name 06/26/25 0931          Safety Issues/Impairments Affecting Functional Mobility    Comment, Safety Issues/Impairments (Mobility) none identified  -CS               User Key  (r) = Recorded By, (t) = Taken By, (c) = Cosigned By      Initials Name Provider Type     Yoshi Hernandez OT Occupational Therapist                   Mobility/ADL's       Row Name 06/26/25 0935          Bed Mobility    Bed Mobility supine-sit;sit-supine;scooting/bridging  -CS     Scooting/Bridging [place] (Bed Mobility) independent  -CS     Supine-Sit [place] (Bed Mobility) independent  -CS     Sit-Supine [place] (Bed Mobility) independent  -CS     Assistive Device (Bed Mobility) head of bed elevated  -CS     Comment, (Bed Mobility) appropriate sequencing intact, no dizziness reported  -CS       Row Name 06/26/25 0935          Transfers    Transfers sit-stand transfer;toilet transfer  -CS     Comment, (Transfers) no AD, no LOB  -CS       Row Name 06/26/25 0935          Sit-Stand Transfer    Sit-Stand [place] (Transfers) independent  -CS       Row Name 06/26/25 0935          Toilet Transfer    Type (Toilet Transfer) sit-stand;stand-sit  -     [place] Level (Toilet Transfer) independent  -CS       Row Name 06/26/25 0935          Functional Mobility    Functional Mobility- Comment no LOB during long hallway mobility,  -CS     Patient was able to Ambulate yes  -CS       Row Name 06/26/25 0935          Activities of Daily Living    BADL Assessment/Intervention upper body dressing;lower body dressing;grooming;toileting  -CS       Row Name 06/26/25 0935          Upper Body Dressing Assessment/Training    [place]  Level (Upper Body Dressing) don;front opening garment;independent  -CS       Row Name 06/26/25 0935          Lower Body Dressing Assessment/Training    [place] Level (Lower Body Dressing) don;socks;independent  -CS       Row Name 06/26/25 0935          Grooming Assessment/Training    [place] Level (Grooming) hair care, combing/brushing;oral care regimen;wash face, hands;independent  -CS     Position (Grooming) sink side  -CS       Row Name 06/26/25 0935          Toileting Assessment/Training    [place] Level (Toileting) toileting skills;adjust/manage clothing;perform perineal hygiene;independent  -CS     Position (Toileting) unsupported sitting;unsupported standing  -               User Key  (r) = Recorded By, (t) = Taken By, (c) = Cosigned By      Initials Name Provider Type    Yoshi Mccurdy, OT Occupational Therapist                   Obj/Interventions       Row Name 06/26/25 0939          Sensory Assessment (Somatosensory)    Sensory Assessment (Somatosensory) bilateral UE  -CS     Bilateral UE Sensory Assessment general sensation;light touch awareness;light touch localization;proprioception  -       Row Name 06/26/25 0939          Vision Assessment/Intervention    Visual Impairment/Limitations WFL;corrective lenses for reading  -       Row Name 06/26/25 0939          Range of Motion Comprehensive    General Range of Motion no range of motion deficits identified  -       Row Name 06/26/25 0939          Strength Comprehensive (MMT)    General Manual Muscle Testing (MMT) Assessment no strength deficits identified  -     Comment, General Manual Muscle Testing (MMT) Assessment B UE grossly 4+/5, no significant asymmetry observed  -       Row Name 06/26/25 0939          Motor Skills    Motor Skills coordination;functional endurance  -CS     Coordination bimanual skills;WFL  -CS     Functional Endurance O2 sats stable on RA  -CS       Row Name 06/26/25 0939          Balance    Balance  Assessment sitting static balance;sitting dynamic balance;standing static balance;standing dynamic balance  -CS     Static Sitting Balance independent  -CS     Dynamic Sitting Balance independent  -CS     Position, Sitting Balance sitting edge of bed;unsupported  -CS     Static Standing Balance independent  -CS     Dynamic Standing Balance independent  -CS     Position/Device Used, Standing Balance unsupported  -CS     Balance Interventions sitting;standing;sit to stand;occupation based/functional task  -CS               User Key  (r) = Recorded By, (t) = Taken By, (c) = Cosigned By      Initials Name Provider Type    CS Yoshi Hernandez, OT Occupational Therapist                   Goals/Plan    No documentation.                  Clinical Impression       Row Name 06/26/25 0940          Pain Assessment    Additional Documentation Pain Scale: FACES Pre/Post-Treatment (Group)  -CS       Row Name 06/26/25 0940          Pain Scale: FACES Pre/Post-Treatment    Pain: FACES Scale, Pretreatment 0-->no hurt  -CS     Posttreatment Pain Rating 0-->no hurt  -CS       Row Name 06/26/25 0940          Plan of Care Review    Plan of Care Reviewed With patient  -CS     Progress no change  -CS     Outcome Evaluation Pt presents at baseline for ADL completion w/ symmetrical BUE support and coordination/sensation intact. OT signing off, please reconsult if needed. Rec d/c to home when medically appropriate.  -CS       Row Name 06/26/25 0940          Therapy Assessment/Plan (OT)    Criteria for Skilled Therapeutic Interventions Met (OT) does not meet criteria for skilled intervention;no  -CS     Therapy Frequency (OT) evaluation only  -CS       Row Name 06/26/25 0940          Therapy Plan Review/Discharge Plan (OT)    Anticipated Discharge Disposition (OT) home  -CS       Row Name 06/26/25 0940          Vital Signs    Pre Systolic BP Rehab 153  RN cleared for eval  -CS     Pre Treatment Diastolic BP 75  -CS     Post Systolic BP Rehab  204  -CS     Post Treatment Diastolic BP 92  -CS     Posttreatment Heart Rate (beats/min) 85  -CS     O2 Delivery Pre Treatment room air  -CS     O2 Delivery Intra Treatment room air  -CS     Post SpO2 (%) 97  -CS     O2 Delivery Post Treatment room air  -CS     Pre Patient Position Supine  -CS     Intra Patient Position Standing  -CS     Post Patient Position Sitting  -CS       Row Name 06/26/25 0940          Positioning and Restraints    Pre-Treatment Position in bed  -CS     Post Treatment Position bed  -CS     In Bed notified nsg;side lying left;call light within reach;encouraged to call for assist;patient within staff view  w/ echo tech, no alarm w/ RN reporting Pt up ad amalia  -CS               User Key  (r) = Recorded By, (t) = Taken By, (c) = Cosigned By      Initials Name Provider Type    CS Yoshi Hernandez, OT Occupational Therapist                   Outcome Measures       Row Name 06/26/25 0947          How much help from another is currently needed...    Putting on and taking off regular lower body clothing? 4  -CS     Bathing (including washing, rinsing, and drying) 4  -CS     Toileting (which includes using toilet bed pan or urinal) 4  -CS     Putting on and taking off regular upper body clothing 4  -CS     Taking care of personal grooming (such as brushing teeth) 4  -CS     Eating meals 4  -CS     AM-PAC 6 Clicks Score (OT) 24  -CS       Row Name 06/26/25 0800          How much help from another person do you currently need...    Turning from your back to your side while in flat bed without using bedrails? 4  -AL     Moving from lying on back to sitting on the side of a flat bed without bedrails? 4  -AL     Moving to and from a bed to a chair (including a wheelchair)? 4  -AL     Standing up from a chair using your arms (e.g., wheelchair, bedside chair)? 4  -AL     Climbing 3-5 steps with a railing? 4  -AL     To walk in hospital room? 4  -AL     AM-PAC 6 Clicks Score (PT) 24  -AL       Row Name  06/26/25 0947          Modified [place] Scale    Modified [place] Scale 0 - No Symptoms at all.  -CS       Row Name 06/26/25 0947          Functional Assessment    Outcome Measure Options Modified [place];AM-PAC 6 Clicks Daily Activity (OT)  -               User Key  (r) = Recorded By, (t) = Taken By, (c) = Cosigned By      Initials Name Provider Type    CS Yoshi Hernandez, LUCAS Occupational Therapist    Carmelina Macias RN Registered Nurse                  Occupational Therapy Education       Title: PT OT SLP Therapies (Done)       Topic: Occupational Therapy (Done)       Point: Precautions (Done)       Learning Progress Summary            Patient Acceptance, E,D, VU,DU by  at 6/26/2025 0947                                      User Key       Initials Effective Dates Name Provider Type Discipline     06/16/21 -  Yoshi Hernandez OT Occupational Therapist OT                  OT Recommendation and Plan  Therapy Frequency (OT): evaluation only  Plan of Care Review  Plan of Care Reviewed With: patient  Progress: no change  Outcome Evaluation: Pt presents at baseline for ADL completion w/ symmetrical BUE support and coordination/sensation intact. OT signing off, please reconsult if needed. Rec d/c to home when medically appropriate.  Plan of Care Reviewed With: patient  Outcome Evaluation: Pt presents at baseline for ADL completion w/ symmetrical BUE support and coordination/sensation intact. OT signing off, please reconsult if needed. Rec d/c to home when medically appropriate.     Time Calculation:   Evaluation Complexity (OT)  Review Occupational Profile/Medical/Therapy History Complexity: brief/low complexity  Assessment, Occupational Performance/Identification of Deficit Complexity: 1-3 performance deficits  Clinical Decision Making Complexity (OT): problem focused assessment/low complexity  Overall Complexity of Evaluation (OT): low complexity     Time Calculation- OT       Row Name 06/26/25 0948              Time Calculation- OT    OT Start Time 0900  -CS      OT Received On 06/26/25  -CS         Untimed Charges    OT Eval/Re-eval Minutes 48  -CS         Total Minutes    Untimed Charges Total Minutes 48  -CS       Total Minutes 48  -CS                User Key  (r) = Recorded By, (t) = Taken By, (c) = Cosigned By      Initials Name Provider Type    CS Yoshi Hernandez, OT Occupational Therapist                  Therapy Charges for Today       Code Description Service Date Service Provider Modifiers Qty    [id number]  OT EVAL LOW COMPLEXITY 4 6/26/2025 Yoshi Hernandez OT GO 1               OT Discharge Summary  Anticipated Discharge Disposition (OT): home  Reason for Discharge: Independent, At baseline function  Discharge Destination: Home    Yoshi Hernandez OT  6/26/2025

## 2025-06-26 NOTE — CONSULTS
Diabetes Education    Patient Name:  Naila Landin  YOB: 1940  MRN: [id number]  Admit Date:  6/25/2025        Consult received for diabetes education per stroke protocol. A1c is 6.2%. No history in H&P. No medication on home medication list. Please reconsult as needed.     Electronically signed by:  Vicki Dorsey RN  06/26/25 08:46 EDT

## 2025-06-27 ENCOUNTER — READMISSION MANAGEMENT (OUTPATIENT)
Dept: CALL CENTER | Facility: HOSPITAL | Age: 85
End: 2025-06-27
Payer: MEDICARE

## 2025-06-27 VITALS
OXYGEN SATURATION: 98 % | HEIGHT: 66 IN | HEART RATE: 97 BPM | BODY MASS INDEX: 32.14 KG/M2 | TEMPERATURE: 97.7 F | SYSTOLIC BLOOD PRESSURE: 153 MMHG | DIASTOLIC BLOOD PRESSURE: 88 MMHG | WEIGHT: 200 LBS | RESPIRATION RATE: 17 BRPM

## 2025-06-27 PROCEDURE — 99239 HOSP IP/OBS DSCHRG MGMT >30: CPT | Performed by: NURSE PRACTITIONER

## 2025-06-27 PROCEDURE — 97161 PT EVAL LOW COMPLEX 20 MIN: CPT

## 2025-06-27 RX ORDER — ATORVASTATIN CALCIUM 40 MG/1
40 TABLET, FILM COATED ORAL NIGHTLY
Qty: 30 TABLET | Refills: 0 | Status: SHIPPED | OUTPATIENT
Start: 2025-06-27

## 2025-06-27 RX ORDER — ASPIRIN 81 MG/1
81 TABLET, CHEWABLE ORAL DAILY
Qty: 30 TABLET | Refills: 0 | Status: SHIPPED | OUTPATIENT
Start: 2025-06-28

## 2025-06-27 RX ORDER — CLOPIDOGREL BISULFATE 75 MG/1
75 TABLET ORAL DAILY
Qty: 89 TABLET | Refills: 0 | Status: SHIPPED | OUTPATIENT
Start: 2025-06-28 | End: 2025-09-25

## 2025-06-27 RX ORDER — METOPROLOL TARTRATE 50 MG
50 TABLET ORAL ONCE
Status: COMPLETED | OUTPATIENT
Start: 2025-06-27 | End: 2025-06-27

## 2025-06-27 RX ADMIN — ASPIRIN 81 MG: 81 TABLET, CHEWABLE ORAL at 08:00

## 2025-06-27 RX ADMIN — Medication 10 ML: at 08:00

## 2025-06-27 RX ADMIN — METOPROLOL TARTRATE 50 MG: 50 TABLET, FILM COATED ORAL at 10:44

## 2025-06-27 RX ADMIN — AMLODIPINE BESYLATE 5 MG: 5 TABLET ORAL at 08:00

## 2025-06-27 RX ADMIN — CLOPIDOGREL BISULFATE 75 MG: 75 TABLET, FILM COATED ORAL at 08:00

## 2025-06-27 RX ADMIN — LEVOTHYROXINE SODIUM 25 MCG: 0.03 TABLET ORAL at 05:47

## 2025-06-27 NOTE — SIGNIFICANT NOTE
Stroke Neurology:     MRI Brain Without Contrast  Result Date: 6/27/2025  MRI BRAIN WO CONTRAST Date of Exam: 6/26/2025 11:15 PM EDT Indication: Stroke, follow up Transient left-sided weakness and numbness.  Comparison: Head CT 6/25/2025, brain MRI 10/4/2012 Technique:  Routine multiplanar/multisequence sequence images of the brain were obtained without contrast administration. Findings: Diffusion images reveal no acute or subacute infarct. There is generalized atrophy. Ventricular size and configuration are within normal limits. T2 hyperintense changes in the periventricular and subcortical white matter bilaterally, particularly in the corona radiata, are most compatible with chronic small vessel ischemic disease. These findings are progressive since the prior brain MRI. There there is an old lacunar infarct in the left cerebellum. No acute hemorrhage is identified. Craniocervical junction is normal. Pituitary gland unremarkable. No masses are seen. Major intracranial flow voids are maintained.     Impression: 1.No evidence of acute or subacute infarct. No acute hemorrhage. 2.Atrophy and chronic small vessel ischemic disease. 3.Old lacunar infarct left cerebellum. Electronically Signed: Ephraim Montgomery MD  6/27/2025 2:31 AM EDT  Workstation ID: [id number]    MRI completed and negative for any acute intracranial process. Suspect TIA secondary to presenting symptoms.  She can continue aspirin 81 mg daily and Plavix 75 mg daily for a total of 90 days (September 25).  Starting September 26 she can continue aspirin 81 mg daily monotherapy.  Continue Lipitor 40 mg nightly.  She can follow-up in the stroke clinic in 6-8 weeks (added to ADT).  From a stroke neurology perspective the patient is appropriate to be discharged home.  Please call with any further questions or concerns.    Laura Apodaca, YVONNE  06/27/25

## 2025-06-27 NOTE — DISCHARGE INSTRUCTIONS
Discussed the importance of medication compliance Plavix 75mg daily, Aspirin 81mg daily, and Atorvastatin 40mg nightly and lifestyle modifications adequate control of blood pressure, adequate control of cholesterol (goal LDL <70), adequate control of glucose (<140, A1c goal <7), increased physical activity, and implementation of healthy diet to help reduce the risk of future cerebrovascular events.  Also discussed the signs symptoms that would warrant the patient return back to the emergency department including unilateral weakness, unilateral numbness, visual disturbances, loss of balance, speech difficulties, and/or a sudden severe headache.        She can continue aspirin 81 mg daily and Plavix 75 mg daily for a total of 90 days (September 25).  Starting September 26 she can continue aspirin 81 mg daily monotherapy.  Continue Lipitor 40 mg nightly.    Please check bp and keep log to take to pcp office. If sbp > 170 after taking norvasc then resume your lopressor. Take half the dose of toprol 50 mg to monitor how you do, if bp still > 170 then you make resume whole dose. If sbp < 130 do not take bp med.     Please check glucose at home and keep log. Check at least twice a day or more. You can check before meals and at night or at least in am and before dinner

## 2025-06-27 NOTE — DISCHARGE SUMMARY
[facility] Medicine Services  DISCHARGE SUMMARY    Patient Name: Naila Landin  : 1940  MRN: [id number]    Date of Admission: 2025  3:19 PM  Date of Discharge:  25  Primary Care Physician: Tye Turcios MD    Consults       Date and Time Order Name Status Description    2025  3:19 PM Inpatient Neurology Consult Stroke Completed             Hospital Course     Presenting Problem: f/u l sided paresthesias/heaviness     Active Hospital Problems    Diagnosis  POA    **TIA (transient ischemic attack) [G45.9]  Yes    Left-sided weakness [R53.1]  Yes      Resolved Hospital Problems   No resolved problems to display.          Hospital Course:  Naila Landin is a 85 y.o. female   with known medical diagnoses of essential hypertension, hyperlipidemia, obesity, cecal neoplasm s/p laparoscopic right colectomy (), and right breast cancer who presents to the emergency department via EMS for further evaluation of transient episode of left-sided paresthesias/heaviness.          Transient left-sided paresthesias/heaviness, etiology right hemispheric TIA versus CVA  -MRI brain negative for any acute intracranial process. Suspect TIA secondary to presenting symptoms.   -ASA 81, Plavix 75mg for 90 days and starting  she can cont with ASA 81 mg alone, Statin  -Echo EF 65%  -Stroke Neuro has sen   -OT signed off.      Essential hypertension  -BP parameters per Neuro sbp 130-160  -avoid hypotension, restarted norvasc, and will hold lopressor for now, instructed patient to check bp multiple times a day and if sbp above 170 after medication to restart lopressor.      Elevated LFTs  -trended down     Hyperlipidemia  -Lipid panel reviewed   -- start statin     Patient has remained clinically stable and will be discharged home today.       Discharge Follow Up Recommendations for outpatient labs/diagnostics:   Follow up with pcp one week   Follow up with  stroke clinic 6-8 weeks     Day of Discharge     HPI:   Patient is sitting on side of bed in NAD with  at bedside. She slept well and fells good this am. Plan for discharge home     Review of Systems  Gen- No fevers, chills  CV- No chest pain, palpitations  Resp- No cough, dyspnea  GI- No N/V/D, abd pain      Vital Signs:   Temp:  [97.7 °F (36.5 °C)-98.3 °F (36.8 °C)] 97.7 °F (36.5 °C)  Heart Rate:  [] 97  Resp:  [17-19] 17  BP: (169-204)/(67-92) 174/92      Physical Exam:  Constitutional: No acute distress, awake, alert  HENT: NCAT, mucous membranes moist  Respiratory: Clear to auscultation bilaterally, respiratory effort normal room air   Cardiovascular: RRR, no murmurs, rubs, or gallops  Gastrointestinal: Positive bowel sounds, soft, nontender, nondistended  Musculoskeletal: No bilateral ankle edema  Psychiatric: Appropriate affect, cooperative  Neurologic: Oriented x 3, strength symmetric in all extremities, , speech clear  Skin: No rashes      Pertinent  and/or Most Recent Results     LAB RESULTS:      Lab 06/26/25  0908 06/25/25  1530 06/25/25  1529   WBC 7.11  --  9.17   HEMOGLOBIN 13.1  --  13.2   HEMOGLOBIN, POC  --  13.9  --    HEMATOCRIT 40.2  --  40.8   HEMATOCRIT POC  --  41  --    PLATELETS 210  --  233   NEUTROS ABS  --   --  5.20   IMMATURE GRANS (ABS)  --   --  0.02   LYMPHS ABS  --   --  2.99   MONOS ABS  --   --  0.74   EOS ABS  --   --  0.16   MCV 97.6*  --  96.9   PROTIME  --   --  14.9   APTT  --   --  30.7         Lab 06/26/25  0908 06/25/25  1530 06/25/25  1529   SODIUM 138  --  138   POTASSIUM 4.0  --  3.9   CHLORIDE 103  --  102   CO2 20.0*  --  25.0   ANION GAP 15.0  --  11.0   BUN 10.5  --  17.3   CREATININE 0.62 1.00 0.81   EGFR 88.5 56.0* 72.1   GLUCOSE 243*  --  129*   CALCIUM 7.9*  --  8.7   HEMOGLOBIN A1C 6.57*  --   --          Lab 06/26/25  0908 06/25/25  1529   TOTAL PROTEIN 6.2 6.6   ALBUMIN 3.7 3.8   GLOBULIN 2.5 2.8   ALT (SGPT) 40* 47*   AST (SGOT) 57* 75*    BILIRUBIN 0.5 0.4   ALK PHOS 77 86         Lab 06/25/25  1529   PROTIME 14.9   INR 1.10         Lab 06/26/25  0908   CHOLESTEROL 131   LDL CHOL 68   HDL CHOL 32*   TRIGLYCERIDES 185*             Brief Urine Lab Results       None          Microbiology Results (last 10 days)       ** No results found for the last 240 hours. **            MRI Brain Without Contrast  Result Date: 6/27/2025  MRI BRAIN WO CONTRAST Date of Exam: 6/26/2025 11:15 PM EDT Indication: Stroke, follow up Transient left-sided weakness and numbness.  Comparison: Head CT 6/25/2025, brain MRI 10/4/2012 Technique:  Routine multiplanar/multisequence sequence images of the brain were obtained without contrast administration. Findings: Diffusion images reveal no acute or subacute infarct. There is generalized atrophy. Ventricular size and configuration are within normal limits. T2 hyperintense changes in the periventricular and subcortical white matter bilaterally, particularly in the corona radiata, are most compatible with chronic small vessel ischemic disease. These findings are progressive since the prior brain MRI. There there is an old lacunar infarct in the left cerebellum. No acute hemorrhage is identified. Craniocervical junction is normal. Pituitary gland unremarkable. No masses are seen. Major intracranial flow voids are maintained.     1.No evidence of acute or subacute infarct. No acute hemorrhage. 2.Atrophy and chronic small vessel ischemic disease. 3.Old lacunar infarct left cerebellum. Electronically Signed: Ephraim Montgomery MD  6/27/2025 2:31 AM EDT  Workstation ID: [id number]    XR Chest 1 View  Result Date: 6/25/2025  XR CHEST 1 VW Date of Exam: 6/25/2025 3:45 PM EDT Indication: Acute Stroke Protocol (onset < 12 hrs) Comparison: Chest radiograph 11/5/2012. Findings: Patient is rotated. Within these confines, cardiomediastinal silhouette is within normal limits. Probable benign calcified granuloma in the left lung. No focal  consolidation. No pleural effusion or pneumothorax. Osseous structures are unremarkable.     Impression: No acute cardiopulmonary findings. Electronically Signed: Leonard Baxter MD  6/25/2025 4:08 PM EDT  Workstation ID: [id number]    CT Head Without Contrast Stroke Protocol  Result Date: 6/25/2025  CT ANGIOGRAM HEAD W AI ANALYSIS OF LVO, CT CEREBRAL PERFUSION W WO CONTRAST, CT ANGIOGRAM NECK, CT HEAD WO CONTRAST STROKE PROTOCOL Date of Exam: 6/25/2025 3:23 PM EDT Indication: Neuro Deficit, acute, Stroke suspected Neuro deficit, acute stroke suspected. Comparison: None available. Technique: Axial noncontrast CT of the head with multiplanar reconstruction. Axial CT images of the brain were obtained prior to and after the administration of 115 mL . CT Perfusion protocol was utilized. Automated post processing was performed by RAPID  software and submitted to PACS for interpretation.  CTA of the head and neck were performed after the administration of iodinated contrast.  Reconstructed coronal and sagittal images were also obtained. A 3-D volume rendered image was created for interpretation. Automated exposure control and iterative reconstruction methods were used.  FINDINGS: CT HEAD: Gray-white differentiation is maintained and there is no evidence of intracranial hemorrhage, mass or mass effect. Age-related changes of the brain are present including volume loss and typical periventricular sequela of chronic small vessel ischemia. There is otherwise no evidence of intracranial hemorrhage, mass or mass effect. The ventricles are normal in size and configuration accounting for surrounding volume loss. The orbits are normal and the paranasal sinuses are grossly clear. CT PERFUSION: There is an area of prolonged Tmax seen within the left parietal lobe, left posterior MCA territory, without underlying core infarct, potentially representing some age-indeterminate hypoperfusion or ischemic tissue at risk. CT ANGIOGRAM: The lung  apices are clear. Evaluation of the neck soft tissues demonstrates no evidence of aerodigestive tract mass or pathologic cervical lymphadenopathy. Multilevel cervical spondylosis is present, without evidence of acute fracture or aggressive osseous lesion. Patent aortic arch with shared origin of the brachiocephalic and left common carotid arteries. The subclavian arteries appear patent bilaterally, partially obscured on the right. The ICA origins are patent bilaterally with 0% narrowing present by NASCET criteria. The right and left vertebral arteries are patent bilaterally. Intracranially, the carotid siphons demonstrate calcific atherosclerotic changes with some mild narrowing of both ophthalmic segments. The anterior cerebral arteries are patent, with azygos system noted. The right middle cerebral artery is normal in course and caliber. There is some mild multifocal atherosclerotic narrowing of the left M1 segment MCA which is otherwise patent. The vertebrobasilar system is patent. The right posterior cerebral artery is normal in course and caliber. There is mild to moderate narrowing of the left PCA near the P1-P2 junction.     Age-related changes of the brain as above, otherwise without evidence of acute intracranial abnormality. CT perfusion demonstrates some nonspecific prolonged Tmax within the left posterior MCA territory, suggesting somewhat age indeterminate hypoperfusion, with acute component/ischemic tissue at risk difficult to exclude. Multifocal cervical and intracranial atherosclerotic changes are present, without evidence of focal high-grade stenosis, large vessel occlusion or aneurysm. Electronically Signed: Derek Adkins MD  6/25/2025 4:01 PM EDT  Workstation ID: [id number]    CT Angiogram Head w AI Analysis of LVO  Result Date: 6/25/2025  CT ANGIOGRAM HEAD W AI ANALYSIS OF LVO, CT CEREBRAL PERFUSION W WO CONTRAST, CT ANGIOGRAM NECK, CT HEAD WO CONTRAST STROKE PROTOCOL Date of Exam: 6/25/2025 3:23  PM EDT Indication: Neuro Deficit, acute, Stroke suspected Neuro deficit, acute stroke suspected. Comparison: None available. Technique: Axial noncontrast CT of the head with multiplanar reconstruction. Axial CT images of the brain were obtained prior to and after the administration of 115 mL . CT Perfusion protocol was utilized. Automated post processing was performed by RAPID  software and submitted to PACS for interpretation.  CTA of the head and neck were performed after the administration of iodinated contrast.  Reconstructed coronal and sagittal images were also obtained. A 3-D volume rendered image was created for interpretation. Automated exposure control and iterative reconstruction methods were used.  FINDINGS: CT HEAD: Gray-white differentiation is maintained and there is no evidence of intracranial hemorrhage, mass or mass effect. Age-related changes of the brain are present including volume loss and typical periventricular sequela of chronic small vessel ischemia. There is otherwise no evidence of intracranial hemorrhage, mass or mass effect. The ventricles are normal in size and configuration accounting for surrounding volume loss. The orbits are normal and the paranasal sinuses are grossly clear. CT PERFUSION: There is an area of prolonged Tmax seen within the left parietal lobe, left posterior MCA territory, without underlying core infarct, potentially representing some age-indeterminate hypoperfusion or ischemic tissue at risk. CT ANGIOGRAM: The lung apices are clear. Evaluation of the neck soft tissues demonstrates no evidence of aerodigestive tract mass or pathologic cervical lymphadenopathy. Multilevel cervical spondylosis is present, without evidence of acute fracture or aggressive osseous lesion. Patent aortic arch with shared origin of the brachiocephalic and left common carotid arteries. The subclavian arteries appear patent bilaterally, partially obscured on the right. The ICA origins are  patent bilaterally with 0% narrowing present by NASCET criteria. The right and left vertebral arteries are patent bilaterally. Intracranially, the carotid siphons demonstrate calcific atherosclerotic changes with some mild narrowing of both ophthalmic segments. The anterior cerebral arteries are patent, with azygos system noted. The right middle cerebral artery is normal in course and caliber. There is some mild multifocal atherosclerotic narrowing of the left M1 segment MCA which is otherwise patent. The vertebrobasilar system is patent. The right posterior cerebral artery is normal in course and caliber. There is mild to moderate narrowing of the left PCA near the P1-P2 junction.     Age-related changes of the brain as above, otherwise without evidence of acute intracranial abnormality. CT perfusion demonstrates some nonspecific prolonged Tmax within the left posterior MCA territory, suggesting somewhat age indeterminate hypoperfusion, with acute component/ischemic tissue at risk difficult to exclude. Multifocal cervical and intracranial atherosclerotic changes are present, without evidence of focal high-grade stenosis, large vessel occlusion or aneurysm. Electronically Signed: Derek Adkins MD  6/25/2025 4:01 PM EDT  Workstation ID: [id number]    CT Angiogram Neck  Result Date: 6/25/2025  CT ANGIOGRAM HEAD W AI ANALYSIS OF LVO, CT CEREBRAL PERFUSION W WO CONTRAST, CT ANGIOGRAM NECK, CT HEAD WO CONTRAST STROKE PROTOCOL Date of Exam: 6/25/2025 3:23 PM EDT Indication: Neuro Deficit, acute, Stroke suspected Neuro deficit, acute stroke suspected. Comparison: None available. Technique: Axial noncontrast CT of the head with multiplanar reconstruction. Axial CT images of the brain were obtained prior to and after the administration of 115 mL . CT Perfusion protocol was utilized. Automated post processing was performed by RAPID  software and submitted to PACS for interpretation.  CTA of the head and neck were performed  after the administration of iodinated contrast.  Reconstructed coronal and sagittal images were also obtained. A 3-D volume rendered image was created for interpretation. Automated exposure control and iterative reconstruction methods were used.  FINDINGS: CT HEAD: Gray-white differentiation is maintained and there is no evidence of intracranial hemorrhage, mass or mass effect. Age-related changes of the brain are present including volume loss and typical periventricular sequela of chronic small vessel ischemia. There is otherwise no evidence of intracranial hemorrhage, mass or mass effect. The ventricles are normal in size and configuration accounting for surrounding volume loss. The orbits are normal and the paranasal sinuses are grossly clear. CT PERFUSION: There is an area of prolonged Tmax seen within the left parietal lobe, left posterior MCA territory, without underlying core infarct, potentially representing some age-indeterminate hypoperfusion or ischemic tissue at risk. CT ANGIOGRAM: The lung apices are clear. Evaluation of the neck soft tissues demonstrates no evidence of aerodigestive tract mass or pathologic cervical lymphadenopathy. Multilevel cervical spondylosis is present, without evidence of acute fracture or aggressive osseous lesion. Patent aortic arch with shared origin of the brachiocephalic and left common carotid arteries. The subclavian arteries appear patent bilaterally, partially obscured on the right. The ICA origins are patent bilaterally with 0% narrowing present by NASCET criteria. The right and left vertebral arteries are patent bilaterally. Intracranially, the carotid siphons demonstrate calcific atherosclerotic changes with some mild narrowing of both ophthalmic segments. The anterior cerebral arteries are patent, with azygos system noted. The right middle cerebral artery is normal in course and caliber. There is some mild multifocal atherosclerotic narrowing of the left M1 segment  MCA which is otherwise patent. The vertebrobasilar system is patent. The right posterior cerebral artery is normal in course and caliber. There is mild to moderate narrowing of the left PCA near the P1-P2 junction.     Age-related changes of the brain as above, otherwise without evidence of acute intracranial abnormality. CT perfusion demonstrates some nonspecific prolonged Tmax within the left posterior MCA territory, suggesting somewhat age indeterminate hypoperfusion, with acute component/ischemic tissue at risk difficult to exclude. Multifocal cervical and intracranial atherosclerotic changes are present, without evidence of focal high-grade stenosis, large vessel occlusion or aneurysm. Electronically Signed: Derek Adkins MD  6/25/2025 4:01 PM EDT  Workstation ID: [id number]    CT CEREBRAL PERFUSION WITH & WITHOUT CONTRAST  Result Date: 6/25/2025  CT ANGIOGRAM HEAD W AI ANALYSIS OF LVO, CT CEREBRAL PERFUSION W WO CONTRAST, CT ANGIOGRAM NECK, CT HEAD WO CONTRAST STROKE PROTOCOL Date of Exam: 6/25/2025 3:23 PM EDT Indication: Neuro Deficit, acute, Stroke suspected Neuro deficit, acute stroke suspected. Comparison: None available. Technique: Axial noncontrast CT of the head with multiplanar reconstruction. Axial CT images of the brain were obtained prior to and after the administration of 115 mL . CT Perfusion protocol was utilized. Automated post processing was performed by RAPID  software and submitted to PACS for interpretation.  CTA of the head and neck were performed after the administration of iodinated contrast.  Reconstructed coronal and sagittal images were also obtained. A 3-D volume rendered image was created for interpretation. Automated exposure control and iterative reconstruction methods were used.  FINDINGS: CT HEAD: Gray-white differentiation is maintained and there is no evidence of intracranial hemorrhage, mass or mass effect. Age-related changes of the brain are present including volume loss and  typical periventricular sequela of chronic small vessel ischemia. There is otherwise no evidence of intracranial hemorrhage, mass or mass effect. The ventricles are normal in size and configuration accounting for surrounding volume loss. The orbits are normal and the paranasal sinuses are grossly clear. CT PERFUSION: There is an area of prolonged Tmax seen within the left parietal lobe, left posterior MCA territory, without underlying core infarct, potentially representing some age-indeterminate hypoperfusion or ischemic tissue at risk. CT ANGIOGRAM: The lung apices are clear. Evaluation of the neck soft tissues demonstrates no evidence of aerodigestive tract mass or pathologic cervical lymphadenopathy. Multilevel cervical spondylosis is present, without evidence of acute fracture or aggressive osseous lesion. Patent aortic arch with shared origin of the brachiocephalic and left common carotid arteries. The subclavian arteries appear patent bilaterally, partially obscured on the right. The ICA origins are patent bilaterally with 0% narrowing present by NASCET criteria. The right and left vertebral arteries are patent bilaterally. Intracranially, the carotid siphons demonstrate calcific atherosclerotic changes with some mild narrowing of both ophthalmic segments. The anterior cerebral arteries are patent, with azygos system noted. The right middle cerebral artery is normal in course and caliber. There is some mild multifocal atherosclerotic narrowing of the left M1 segment MCA which is otherwise patent. The vertebrobasilar system is patent. The right posterior cerebral artery is normal in course and caliber. There is mild to moderate narrowing of the left PCA near the P1-P2 junction.     Age-related changes of the brain as above, otherwise without evidence of acute intracranial abnormality. CT perfusion demonstrates some nonspecific prolonged Tmax within the left posterior MCA territory, suggesting somewhat age  indeterminate hypoperfusion, with acute component/ischemic tissue at risk difficult to exclude. Multifocal cervical and intracranial atherosclerotic changes are present, without evidence of focal high-grade stenosis, large vessel occlusion or aneurysm. Electronically Signed: Derek Adkins MD  6/25/2025 4:01 PM EDT  Workstation ID: [id number]              Results for orders placed during the hospital encounter of 06/25/25    Adult Transthoracic Echo Complete W/ Cont if Necessary Per Protocol (With Agitated Saline)    Interpretation Summary    Left ventricular systolic function is normal. Estimated left ventricular EF = 65%    The cardiac valves are anatomically and functionally normal.      Plan for Follow-up of Pending Labs/Results:     Discharge Details        Discharge Medications        PAUSE taking these medications        Instructions Start Date   metoprolol succinate  MG 24 hr tablet  Wait to take this until your doctor or other care provider tells you to start again.  Commonly known as: TOPROL-XL   100 mg, Nightly             New Medications        Instructions Start Date   aspirin 81 MG chewable tablet   81 mg, Oral, Daily   Start Date: June 28, 2025     atorvastatin 40 MG tablet  Commonly known as: LIPITOR   40 mg, Oral, Nightly      clopidogrel 75 MG tablet  Commonly known as: PLAVIX   75 mg, Oral, Daily   Start Date: June 28, 2025            Continue These Medications        Instructions Start Date   alendronate 70 MG tablet  Commonly known as: FOSAMAX   70 mg, Every 7 Days      amLODIPine 5 MG tablet  Commonly known as: NORVASC   5 mg, Oral, Every 24 Hours Scheduled      fluticasone 0.05 % cream  Commonly known as: CUTIVATE   1 Application, Topical, 2 Times Daily      fluticasone 50 MCG/ACT nasal spray  Commonly known as: FLONASE   2 sprays, Daily PRN      latanoprost 0.005 % ophthalmic solution  Commonly known as: XALATAN   1 drop, Nightly      levothyroxine 25 MCG tablet  Commonly known as:  SYNTHROID, LEVOTHROID   25 mcg, Every Early Morning      multivitamin with minerals tablet tablet   1 tablet, Daily               No Known Allergies      Discharge Disposition:  Home or Self Care    Diet:  Hospital:  Diet Order   Procedures    Diet: Cardiac; Healthy Heart (2-3 Na+); Fluid Consistency: Thin (IDDSI 0)       Diet Instructions       Diet: Diabetic Diets, Cardiac Diets; Healthy Heart (2-3 Na+); Regular (IDDSI 7); Thin (IDDSI 0); Consistent Carbohydrate      Discharge Diet:  Diabetic Diets  Cardiac Diets       Cardiac Diet: Healthy Heart (2-3 Na+)    Texture: Regular (IDDSI 7)    Fluid Consistency: Thin (IDDSI 0)    Diabetic Diet: Consistent Carbohydrate             Activity:  Activity Instructions       Activity as Tolerated      Measure Blood Pressure              Restrictions or Other Recommendations:         CODE STATUS:    Code Status and Medical Interventions: CPR (Attempt to Resuscitate); Full Support   Ordered at: 06/25/25 1729     Code Status (Patient has no pulse and is not breathing):    CPR (Attempt to Resuscitate)     Medical Interventions (Patient has pulse or is breathing):    Full Support     Level Of Support Discussed With:    Patient       No future appointments.    Additional Instructions for the Follow-ups that You Need to Schedule       Ambulatory Referral to Neurology   As directed      6-8 week follow up    Discharge Follow-up with PCP   As directed       Currently Documented PCP:    Tye Turcios MD    PCP Phone Number:    [phone]     Follow Up Details: follow up with pcp one week        Discharge Follow-up with Specified Provider: Follow up with stroke clinic 6-8 weeks   As directed      To: Follow up with stroke clinic 6-8 weeks                      YVONNE Murillo  06/27/25      Time Spent on Discharge:  I spent  45  minutes on this discharge activity which included: face-to-face encounter with the patient, reviewing the data in the system, coordination  of the care with the nursing staff as well as consultants, documentation, and entering orders.

## 2025-06-27 NOTE — THERAPY EVALUATION
Patient Name: Naila Landin  : 1940    MRN: [id number]                              Today's Date: 2025       Admit Date: 2025    Visit Dx:     ICD-10-CM ICD-9-CM   1. Acute left-sided weakness  R53.1 728.87   2. Intermittent paresthesia of left hand and foot  R20.2 782.0   3. TIA (transient ischemic attack)  G45.9 435.9     Patient Active Problem List   Diagnosis    Cecal neoplasm    HTN (hypertension)    Hypothyroidism (acquired)    S/P right colectomy, laparoscopic with extensive JESSICA    Elevated hemoglobin A1c    TIA (transient ischemic attack)    Left-sided weakness     Past Medical History:   Diagnosis Date    Cecal neoplasm     Disease of thyroid gland     Diverticulitis     Hypertension     Urinary frequency      Past Surgical History:   Procedure Laterality Date    ABDOMINAL HERNIA REPAIR      with mesh    APPENDECTOMY      CHOLECYSTECTOMY      COLON RESECTION Right 2024    Procedure: OPEN RIGHT COLECTOMY, LAPAROSCOPIC LYSIS OF ADHESIONS;  Surgeon: Caleb Hirsch MD;  Location: [facility];  Service: General;  Laterality: Right;    COLON SURGERY      resection due to diverticulitis    COLONOSCOPY      serveral    HYSTERECTOMY      KNEE ARTHROPLASTY Right     OTHER SURGICAL HISTORY      bladder stimulator    TONSILLECTOMY        General Information       Row Name 25 0931          Physical Therapy Time and Intention    Document Type discharge evaluation/summary  -AB     Mode of Treatment physical therapy  -AB       Row Name 25 0931          General Information    Patient Profile Reviewed yes  -AB     Prior Level of Function independent:;all household mobility;community mobility;gait;transfer;bed mobility;ADL's  Denied falls or AD use.  -AB     Existing Precautions/Restrictions no known precautions/restrictions  -AB     Barriers to Rehab none identified  -AB       Row Name 25 0931          Living Environment    Current Living Arrangements home  -AB     People  in Home spouse  -AB       Row Name 06/27/25 0931          Home Main Entrance    Number of Stairs, Main Entrance two  -AB     Stair Railings, Main Entrance railing on left side (ascending)  -AB       Row Name 06/27/25 0931          Stairs Within Home, Primary    Number of Stairs, Within Home, Primary none  -AB       Row Name 06/27/25 0931          Cognition    Orientation Status (Cognition) oriented x 4  -AB       Row Name 06/27/25 0931          Safety Issues/Impairments Affecting Functional Mobility    Impairments Affecting Function (Mobility) endurance/activity tolerance  -AB               User Key  (r) = Recorded By, (t) = Taken By, (c) = Cosigned By      Initials Name Provider Type    AB Tiffanie Rea PT Physical Therapist                   Mobility       Row Name 06/27/25 0931          Bed Mobility    Bed Mobility supine-sit;sit-supine;scooting/bridging  -AB     Scooting/Bridging Cameron (Bed Mobility) independent  -AB     Supine-Sit Cameron (Bed Mobility) independent  -AB     Sit-Supine Cameron (Bed Mobility) independent  -AB     Comment, (Bed Mobility) no issues noted  -AB       Row Name 06/27/25 0931          Transfers    Comment, (Transfers) Good stability and safety awareness noted.  -AB       Row Name 06/27/25 0931          Sit-Stand Transfer    Sit-Stand Cameron (Transfers) standby assist  -AB       Row Name 06/27/25 0931          Gait/Stairs (Locomotion)    Cameron Level (Gait) standby assist;1 person assist  -AB     Patient was able to Ambulate yes  -AB     Distance in Feet (Gait) 400  -AB     Cameron Level (Stairs) contact guard;1 person assist  -AB     Handrail Location (Stairs) left side (ascending)  -AB     Number of Steps (Stairs) 3  -AB     Ascending Technique (Stairs) step-to-step  -AB     Descending Technique (Stairs) step-to-step  -AB     Comment, (Gait/Stairs) Pt ambulated with step through gait pattern at an appropriate pace. Good stability demonstrated on  stairs.  -AB               User Key  (r) = Recorded By, (t) = Taken By, (c) = Cosigned By      Initials Name Provider Type    AB Tiffanie Rea, PT Physical Therapist                   Obj/Interventions       Row Name 06/27/25 0933          Range of Motion Comprehensive    General Range of Motion bilateral lower extremity ROM WNL  -AB       Row Name 06/27/25 0933          Strength Comprehensive (MMT)    General Manual Muscle Testing (MMT) Assessment lower extremity strength deficits identified  -AB     Comment, General Manual Muscle Testing (MMT) Assessment BLE grossly 4/5, no significant asymmetry  -AB       Row Name 06/27/25 0933          Motor Skills    Motor Skills coordination  -AB     Coordination bilateral;lower extremity;heel to shin;WNL  -AB       Row Name 06/27/25 0933          Balance    Balance Assessment sitting static balance;sitting dynamic balance;standing static balance;standing dynamic balance  -AB     Static Sitting Balance independent  -AB     Dynamic Sitting Balance independent  -AB     Position, Sitting Balance unsupported;sitting edge of bed  -AB     Static Standing Balance standby assist  -AB     Dynamic Standing Balance standby assist  -AB     Position/Device Used, Standing Balance unsupported  -AB     Balance Interventions sitting;standing;sit to stand;static;dynamic;occupation based/functional task;moderate challenge;single limb stance  -AB     Comment, Balance No LOB.  -AB       Row Name 06/27/25 0933          Sensory Assessment (Somatosensory)    Sensory Assessment (Somatosensory) LE sensation intact  -AB               User Key  (r) = Recorded By, (t) = Taken By, (c) = Cosigned By      Initials Name Provider Type    AB Tiffanie Rea, PT Physical Therapist                   Goals/Plan    No documentation.                  Clinical Impression       Row Name 06/27/25 0934          Pain    Pretreatment Pain Rating 0/10 - no pain  -AB     Posttreatment Pain Rating 0/10 - no pain  -AB        Row Name 06/27/25 0934          Plan of Care Review    Plan of Care Reviewed With patient  -AB     Progress no change  -AB     Outcome Evaluation PT initial eval completed. Pt presents at her functional baseline with appropriate strength, coordination intact, and good stability. Ambulation of 400' with SBA and no AD was well tolerated. Pt also navigated steps with good stability. No further IPPT needs at this time, PT signing off. Rec d/c home when medically appropriate.  -AB       Row Name 06/27/25 0934          Therapy Assessment/Plan (PT)    Patient/Family Therapy Goals Statement (PT) go home  -AB     Criteria for Skilled Interventions Met (PT) no;no problems identified which require skilled intervention  -AB     Therapy Frequency (PT) evaluation only  -AB       Row Name 06/27/25 0934          Vital Signs    Pre Systolic BP Rehab 174  -AB     Pre Treatment Diastolic BP 92  -AB     Post Systolic BP Rehab 195   RN aware  -AB     Post Treatment Diastolic BP 92   -AB     Pretreatment Heart Rate (beats/min) 87  -AB     Posttreatment Heart Rate (beats/min) 111  -AB     O2 Delivery Pre Treatment room air  -AB     O2 Delivery Intra Treatment room air  -AB     O2 Delivery Post Treatment room air  -AB     Pre Patient Position Supine  -AB     Intra Patient Position Standing  -AB     Post Patient Position Sitting  -AB       Row Name 06/27/25 0934          Positioning and Restraints    Pre-Treatment Position in bed  -AB     Post Treatment Position bed  -AB     In Bed notified nsg;sitting;sitting EOB;call light within reach  Pt up in room ad amalia  -AB               User Key  (r) = Recorded By, (t) = Taken By, (c) = Cosigned By      Initials Name Provider Type    AB Tiffanie Rea, PT Physical Therapist                   Outcome Measures       Row Name 06/27/25 0937          How much help from another person do you currently need...    Turning from your back to your side while in flat bed without using bedrails? 4  -AB      Moving from lying on back to sitting on the side of a flat bed without bedrails? 4  -AB     Moving to and from a bed to a chair (including a wheelchair)? 3  -AB     Standing up from a chair using your arms (e.g., wheelchair, bedside chair)? 3  -AB     Climbing 3-5 steps with a railing? 3  -AB     To walk in hospital room? 3  -AB     AM-PAC 6 Clicks Score (PT) 20  -AB     Highest Level of Mobility Goal Walk 10 Steps or More-6  -AB       Row Name 06/27/25 [facility]          Modified Somervell Scale    Pre-Stroke Modified Jim Scale 6 - Unable to determine (UTD) from the medical record documentation  -AB     Modified Somervell Scale 0 - No Symptoms at all.  -AB       Row Name 06/27/25 09          Functional Assessment    Outcome Measure Options AM-PAC 6 Clicks Basic Mobility (PT)  -AB               User Key  (r) = Recorded By, (t) = Taken By, (c) = Cosigned By      Initials Name Provider Type    AB Tiffanie Rea, PT Physical Therapist                                 Physical Therapy Education       Title: PT OT SLP Therapies (In Progress)       Topic: Physical Therapy (In Progress)       Point: Mobility training (Done)       Learning Progress Summary            Patient Acceptance, E,D, VU,DU by AB at 6/27/2025 0937                      Point: Home exercise program (Not Started)       Learner Progress:  Not documented in this visit.              Point: Body mechanics (Done)       Learning Progress Summary            Patient Acceptance, E,D, VU,DU by AB at 6/27/2025 0937                      Point: Precautions (Done)       Learning Progress Summary            Patient Acceptance, E,D, VU,DU by AB at 6/27/2025 0937                                      User Key       Initials Effective Dates Name Provider Type Discipline    AB 09/22/22 -  Tiffanie Rea, PT Physical Therapist PT                  PT Recommendation and Plan     Progress: no change  Outcome Evaluation: PT initial eval completed. Pt presents at her functional  baseline with appropriate strength, coordination intact, and good stability. Ambulation of 400' with SBA and no AD was well tolerated. Pt also navigated steps with good stability. No further IPPT needs at this time, PT signing off. Rec d/c home when medically appropriate.     Time Calculation:   PT Evaluation Complexity  History, PT Evaluation Complexity: 1-2 personal factors and/or comorbidities  Examination of Body Systems (PT Eval Complexity): total of 3 or more elements  Clinical Presentation (PT Evaluation Complexity): stable  Clinical Decision Making (PT Evaluation Complexity): low complexity  Overall Complexity (PT Evaluation Complexity): low complexity     PT Charges       Row Name 06/27/25 0937             Time Calculation    Start Time 0914  -AB      PT Received On 06/27/25  -AB      PT Goal Re-Cert Due Date 07/07/25  -AB         Untimed Charges    PT Eval/Re-eval Minutes 35  -AB         Total Minutes    Untimed Charges Total Minutes 35  -AB       Total Minutes 35  -AB                User Key  (r) = Recorded By, (t) = Taken By, (c) = Cosigned By      Initials Name Provider Type    AB Tiffanie Rea, PT Physical Therapist                  Therapy Charges for Today       Code Description Service Date Service Provider Modifiers Qty    [id number]  PT EVAL LOW COMPLEXITY 3 6/27/2025 Tiffanie Rea, PT GP 1            PT G-Codes  Outcome Measure Options: AM-PAC 6 Clicks Basic Mobility (PT)  AM-PAC 6 Clicks Score (PT): 20  AM-PAC 6 Clicks Score (OT): 24  Modified Grindstone Scale: 0 - No Symptoms at all.  PT Discharge Summary  Anticipated Discharge Disposition (PT): home    Tiffanie Rea PT  6/27/2025

## 2025-06-27 NOTE — PLAN OF CARE
Goal Outcome Evaluation:            IV and telemetry d/c by Primary RN, there is no box for this room.   Patient is alert and orientedx4,  at bedside    RN went over discharge paperwork and systolic blood pressure goals. Patient was able to teach back provider's desired range of 140-160 as well as when to monitor her BP, when to hold meds and when to take her metoprolol.  Both stated that they understood the follow up appointments.  Patient expressed her lack of desire to make dietary or exercise changes to her lifestyle.    Patient has been up ad amalia since her admission,  will accompany her as she independently ambulates downstairs where he will drive her home.

## 2025-06-27 NOTE — PLAN OF CARE
Goal Outcome Evaluation:  Plan of Care Reviewed With: patient        Progress: no change  Outcome Evaluation: PT initial eval completed. Pt presents at her functional baseline with appropriate strength, coordination intact, and good stability. Ambulation of 400' with SBA and no AD was well tolerated. Pt also navigated steps with good stability. No further IPPT needs at this time, PT signing off. Rec d/c home when medically appropriate.    Anticipated Discharge Disposition (PT): home

## 2025-06-28 NOTE — OUTREACH NOTE
Prep Survey      Flowsheet Row Responses   [facility]? [place]   Is LACE score < 7 ? No   Eligibility Readm Mgmt   Discharge diagnosis *TIA (transient ischemic attack) (G   Does the patient have one of the following disease processes/diagnoses(primary or secondary)? Stroke   Does the patient have Home health ordered? No   Is there a DME ordered? No   Prep survey completed? Yes            AMANDA FATIMA - Registered Nurse

## 2025-06-30 ENCOUNTER — READMISSION MANAGEMENT (OUTPATIENT)
Dept: CALL CENTER | Facility: HOSPITAL | Age: 85
End: 2025-06-30
Payer: MEDICARE

## 2025-06-30 NOTE — OUTREACH NOTE
Stroke Week 1 Survey      Flowsheet Row Responses   [facility] [place] [facility] [place]   Does the patient have one of the following disease processes/diagnoses(primary or secondary)? Stroke   Week 1 attempt successful? Yes   Call start time 1011   Call end time 1018   Discharge diagnosis *TIA (transient ischemic attack) (G   Meds reviewed with patient/caregiver? Yes   Is the patient having any side effects they believe may be caused by any medication additions or changes? No   Does the patient have all medications ordered at discharge? Yes   Is the patient taking all medications as directed (includes completed medication regime)? Yes   Does the patient have a primary care provider?  Yes   Does the patient have an appointment with their PCP within 7 days of discharge? Yes   Has the patient kept scheduled appointments due by today? N/A   The Stroke Clinic at [facility] requests you follow up with them within 30 days for important follow up care. Please call [phone] to schedule this appointment. Thank you. Yes   Comments phone number given, pt plans to make appt with stroke clinic   Has home health visited the patient within 72 hours of discharge? N/A   Psychosocial issues? No   Does the patient require any assistance with activities of daily living such as eating, bathing, dressing, walking, etc.? No   Does the patient have any residual symptoms from stroke/TIA? No   Does the patient understand the diet ordered at discharge? Yes   Comments daughter is pharmacist, helps with meds, states has daily walks with neighbor   Did the patient receive a copy of their discharge instructions? Yes   Nursing interventions Reviewed instructions with patient   What is the patient's perception of their health status since discharge? Improving   Nursing interventions Nurse provided patient education   Is the patient/caregiver able to teach back the risk factors for a stroke? High blood pressure-goal  below 120/80, History of TIAs, Carotid or other artery disease   Is the patient/caregiver able to teach back signs and symptoms related to disease process for when to call PCP? Yes   Is the patient/caregiver able to teach back signs and symptoms related to disease process for when to call 911? Yes   Is the patient/caregiver able to teach back the hierarchy of who to call/visit for symptoms/problems? PCP, Specialist, Home health nurse, Urgent Care, ED, 911 Yes   Additional teach back comments states will be consulting with hand surgeon about ASA etc prior to hand surgery on 7/14/25   Is the patient able to teach back FAST for Stroke? B alance: Watch for sudden loss of balance, E yes: Check for vision loss, F ace: Look for an uneven smile, A rm: Check if one arm is weak, S peech: Listen for slurred speech, T kandice: Call 9-1-1 right away   Week 1 call completed? Yes   Call end time 1018            Lia DOW - Registered Nurse

## 2025-07-09 ENCOUNTER — READMISSION MANAGEMENT (OUTPATIENT)
Dept: CALL CENTER | Facility: HOSPITAL | Age: 85
End: 2025-07-09
Payer: MEDICARE

## 2025-07-09 NOTE — OUTREACH NOTE
Stroke Week 2 Survey      Flowsheet Row Responses   [facility] patient discharged from? [place]   Does the patient have one of the following disease processes/diagnoses(primary or secondary)? Stroke   Week 2 attempt successful? Yes   Call start time 1723   Call end time 1627   Discharge diagnosis *TIA (transient ischemic attack) (G   Person spoke with today (if not patient) and relationship patient   Medication alerts for this patient Aspirin, Lipitor, Plavix   Meds reviewed with patient/caregiver? Yes   Is the patient having any side effects they believe may be caused by any medication additions or changes? No   Does the patient have all medications ordered at discharge? Yes   Prescription comments No questions or concerns with medications.   Is the patient taking all medications as directed (includes completed medication regime)? Yes   Comments regarding appointments Patient has not made Stroke Clinic appt yet. Advised to call and schedule an appt.   Does the patient have a primary care provider?  Yes   Does the patient have an appointment with their PCP within 7 days of discharge? Yes   Comments regarding PCP Dr. Tye Turcios   Has the patient kept scheduled appointments due by today? Yes   Has home health visited the patient within 72 hours of discharge? N/A   Psychosocial issues? No   Does the patient require any assistance with activities of daily living such as eating, bathing, dressing, walking, etc.? No   Does the patient have any residual symptoms from stroke/TIA? No   Does the patient understand the diet ordered at discharge? Yes   Comments Patient states she is doing very well. Her blood pressure is down and she is walking every day.   Did the patient receive a copy of their discharge instructions? Yes   Nursing interventions Reviewed instructions with patient   What is the patient's perception of their health status since discharge? Improving   Is the patient able to teach back FAST for  Stroke? B alance: Watch for sudden loss of balance, E yes: Check for vision loss, F ace: Look for an uneven smile, A rm: Check if one arm is weak, S peech: Listen for slurred speech, T kandice: Call 9-1-1 right away   Is the patient/caregiver able to teach back the risk factors for a stroke? High blood pressure-goal below 120/80, History of TIAs   Is the patient/caregiver able to teach back signs and symptoms related to disease process for when to call PCP? Yes   Is the patient/caregiver able to teach back signs and symptoms related to disease process for when to call 911? Yes   If the patient is a current smoker, are they able to teach back resources for cessation? Not a smoker   Is the patient/caregiver able to teach back the hierarchy of who to call/visit for symptoms/problems? PCP, Specialist, Home health nurse, Urgent Care, ED, 911 Yes   Week 2 call completed? Yes   Revoked No further contact(revokes)-requires comment   Is the patient interested in additional calls from an ambulatory ? No   Would this patient benefit from a Referral to [facility] Social Work? No   Graduated/Revoked comments Patient doing well. No questions, concerns or needs. No further calls needed.   Call end time 1627            Julee FIGUEROA - Registered Nurse

## (undated) DEVICE — ANTIBACTERIAL UNDYED BRAIDED (POLYGLACTIN 910), SYNTHETIC ABSORBABLE SUTURE: Brand: COATED VICRYL

## (undated) DEVICE — ENDOPATH XCEL BLADELESS TROCARS WITH STABILITY SLEEVES: Brand: ENDOPATH XCEL

## (undated) DEVICE — PK LAP LASR CHOLE 10

## (undated) DEVICE — GOWN,REINFORCE,POLY,SIRUS,BREATH SLV,XLG: Brand: MEDLINE

## (undated) DEVICE — SUT VIC 3/0 SH CR8 27IN VCP784D

## (undated) DEVICE — SUCTION RESERVOIR 400CC LG VOL: Brand: CARDINAL HEALTH

## (undated) DEVICE — MARYLAND JAW LAPAROSCOPIC SEALER/DIVIDER COATED: Brand: LIGASURE

## (undated) DEVICE — SUT PDS 1 CTX 36IN VIO PDP371T

## (undated) DEVICE — CLTH CLENS READYCLEANSE PERI CARE PK/5

## (undated) DEVICE — SYSTEM 1200 "Y" SMARTFILTER: Brand: SYSTEM 1200

## (undated) DEVICE — GLV SURG SENSICARE PI MIC PF SZ6.5 LF STRL

## (undated) DEVICE — TOWEL,OR,DSP,ST,NATURAL,DLX,4/PK,20PK/CS: Brand: MEDLINE

## (undated) DEVICE — TOWEL,OR,DSP,ST,BLUE,STD,4/PK,20PK/CS: Brand: MEDLINE

## (undated) DEVICE — 3M™ IOBAN™ 2 ANTIMICROBIAL INCISE DRAPE 6650EZ: Brand: IOBAN™ 2

## (undated) DEVICE — TRAP FLD MINIVAC MEGADYNE 100ML

## (undated) DEVICE — JELLY,LUBE,STERILE,FLIP TOP,TUBE,2-OZ: Brand: MEDLINE

## (undated) DEVICE — TUBING, SUCTION, 1/4" X 10', STRAIGHT: Brand: MEDLINE

## (undated) DEVICE — TRENDELENBURG WINGPAD POSITIONING KIT DELUXE - WITHOUT BODY STRAP: Brand: SOULE MEDICAL

## (undated) DEVICE — ENDOPATH XCEL UNIVERSAL TROCAR STABLILITY SLEEVES: Brand: ENDOPATH XCEL

## (undated) DEVICE — LAP-PORT CLOSURE DEVICE GUIDES 5MM AND 10/12 MM: Brand: LAP-PORT CLOSURE DEVICE GUIDES 5MM AND 10/12 MM

## (undated) DEVICE — SUT MNCRYL PLS ANTIB UD 4/0 PS2 18IN

## (undated) DEVICE — BLANKT WARM UPPR/BDY ARM/OUT 57X196CM

## (undated) DEVICE — JACKSON-PRATT 100CC BULB RESERVOIR: Brand: CARDINAL HEALTH

## (undated) DEVICE — [HIGH FLOW INSUFFLATOR,  DO NOT USE IF PACKAGE IS DAMAGED,  KEEP DRY,  KEEP AWAY FROM SUNLIGHT,  PROTECT FROM HEAT AND RADIOACTIVE SOURCES.]: Brand: PNEUMOSURE

## (undated) DEVICE — ENSEAL 20 CM SHAFT, LARGE JAW: Brand: ENSEAL X1

## (undated) DEVICE — PREP SOL POVIDONE/IODINE BT 4OZ

## (undated) DEVICE — 450 ML BOTTLE OF 0.05% CHLORHEXIDINE GLUCONATE IN 99.95% STERILE WATER FOR IRRIGATION, USP AND APPLICATOR.: Brand: IRRISEPT ANTIMICROBIAL WOUND LAVAGE

## (undated) DEVICE — ACCESS PLATFORM FOR MINIMALLY INVASIVE SURGERY.: Brand: GELPORT® LAPAROSCOPIC  SYSTEM

## (undated) DEVICE — SUT VIC 0 CTD BR 18IN UNDYE VCP724D

## (undated) DEVICE — LAPAROVUE VISIBILITY SYSTEM LAPAROSCOPIC SOLUTIONS: Brand: LAPAROVUE

## (undated) DEVICE — ADHS SKIN PREMIERPRO EXOFIN TOPICAL HI/VISC .5ML

## (undated) DEVICE — POOLE SUCTION INSTRUMENT WITH REMOVABLE SHEATH: Brand: POOLE

## (undated) DEVICE — TOTAL TRAY, 16FR 10ML SIL FOLEY, URN: Brand: MEDLINE

## (undated) DEVICE — INTENDED FOR TISSUE SEPARATION, AND OTHER PROCEDURES THAT REQUIRE A SHARP SURGICAL BLADE TO PUNCTURE OR CUT.: Brand: BARD-PARKER ® STAINLESS STEEL BLADES

## (undated) DEVICE — JP PERF DRN SIL FLT 10MM FULL: Brand: CARDINAL HEALTH

## (undated) DEVICE — GLV SURG SENSICARE PI ORTHO PF SZ7 LF STRL

## (undated) DEVICE — MEDI-VAC YANKAUER SUCTION HANDLE: Brand: CARDINAL HEALTH

## (undated) DEVICE — SUT SILK 2/0 PS 18IN 1588H

## (undated) DEVICE — Device

## (undated) DEVICE — SAFESECURE,SECUREMENT,FOLEY CATH,STERILE: Brand: MEDLINE